# Patient Record
Sex: FEMALE | Race: WHITE | NOT HISPANIC OR LATINO
[De-identification: names, ages, dates, MRNs, and addresses within clinical notes are randomized per-mention and may not be internally consistent; named-entity substitution may affect disease eponyms.]

---

## 2017-07-14 ENCOUNTER — APPOINTMENT (OUTPATIENT)
Dept: UROLOGY | Facility: CLINIC | Age: 67
End: 2017-07-14

## 2017-07-14 DIAGNOSIS — Z80.3 FAMILY HISTORY OF MALIGNANT NEOPLASM OF BREAST: ICD-10-CM

## 2017-07-14 DIAGNOSIS — Z87.898 PERSONAL HISTORY OF OTHER SPECIFIED CONDITIONS: ICD-10-CM

## 2017-07-14 LAB
APPEARANCE: ABNORMAL
BACTERIA: ABNORMAL
BILIRUBIN URINE: NEGATIVE
BLOOD URINE: ABNORMAL
COLOR: YELLOW
GLUCOSE QUALITATIVE U: 1000 MG/DL
HYALINE CASTS: 1 /LPF
KETONES URINE: NEGATIVE
LEUKOCYTE ESTERASE URINE: ABNORMAL
MICROSCOPIC-UA: NORMAL
NITRITE URINE: NEGATIVE
PH URINE: 6.5
PROTEIN URINE: 30 MG/DL
RED BLOOD CELLS URINE: 2 /HPF
SPECIFIC GRAVITY URINE: 1.03
SQUAMOUS EPITHELIAL CELLS: 1 /HPF
UROBILINOGEN URINE: NORMAL MG/DL
WHITE BLOOD CELLS URINE: 224 /HPF

## 2017-07-19 ENCOUNTER — TRANSCRIPTION ENCOUNTER (OUTPATIENT)
Age: 67
End: 2017-07-19

## 2017-07-20 LAB — BACTERIA UR CULT: ABNORMAL

## 2017-09-08 ENCOUNTER — APPOINTMENT (OUTPATIENT)
Dept: UROLOGY | Facility: CLINIC | Age: 67
End: 2017-09-08
Payer: MEDICARE

## 2017-09-08 VITALS
TEMPERATURE: 98 F | SYSTOLIC BLOOD PRESSURE: 191 MMHG | BODY MASS INDEX: 37.89 KG/M2 | HEART RATE: 62 BPM | RESPIRATION RATE: 16 BRPM | HEIGHT: 68 IN | WEIGHT: 250 LBS | DIASTOLIC BLOOD PRESSURE: 71 MMHG

## 2017-09-08 PROCEDURE — 99214 OFFICE O/P EST MOD 30 MIN: CPT

## 2017-09-09 LAB — BACTERIA UR CULT: ABNORMAL

## 2018-01-02 ENCOUNTER — APPOINTMENT (OUTPATIENT)
Dept: UROLOGY | Facility: CLINIC | Age: 68
End: 2018-01-02

## 2018-03-09 LAB — BACTERIA UR CULT: NORMAL

## 2018-05-18 ENCOUNTER — LABORATORY RESULT (OUTPATIENT)
Age: 68
End: 2018-05-18

## 2018-06-01 ENCOUNTER — MEDICATION RENEWAL (OUTPATIENT)
Age: 68
End: 2018-06-01

## 2018-06-01 LAB
APPEARANCE: CLEAR
BACTERIA: NEGATIVE
BILIRUBIN URINE: NEGATIVE
BLOOD URINE: NEGATIVE
COLOR: YELLOW
GLUCOSE QUALITATIVE U: 1000 MG/DL
HYALINE CASTS: 4 /LPF
KETONES URINE: ABNORMAL
LEUKOCYTE ESTERASE URINE: NEGATIVE
MICROSCOPIC-UA: NORMAL
NITRITE URINE: NEGATIVE
PH URINE: 5.5
PROTEIN URINE: NEGATIVE MG/DL
RED BLOOD CELLS URINE: 2 /HPF
SPECIFIC GRAVITY URINE: 1.03
SQUAMOUS EPITHELIAL CELLS: 1 /HPF
UROBILINOGEN URINE: NEGATIVE MG/DL
WHITE BLOOD CELLS URINE: 1 /HPF

## 2018-06-02 ENCOUNTER — TRANSCRIPTION ENCOUNTER (OUTPATIENT)
Age: 68
End: 2018-06-02

## 2018-06-04 LAB — BACTERIA UR CULT: NORMAL

## 2018-06-11 ENCOUNTER — TRANSCRIPTION ENCOUNTER (OUTPATIENT)
Age: 68
End: 2018-06-11

## 2018-07-20 ENCOUNTER — RX RENEWAL (OUTPATIENT)
Age: 68
End: 2018-07-20

## 2018-08-07 ENCOUNTER — APPOINTMENT (OUTPATIENT)
Dept: UROLOGY | Facility: CLINIC | Age: 68
End: 2018-08-07
Payer: MEDICARE

## 2018-08-07 VITALS
OXYGEN SATURATION: 97 % | TEMPERATURE: 98.3 F | SYSTOLIC BLOOD PRESSURE: 136 MMHG | DIASTOLIC BLOOD PRESSURE: 74 MMHG | HEART RATE: 55 BPM

## 2018-08-07 PROCEDURE — 99214 OFFICE O/P EST MOD 30 MIN: CPT

## 2018-08-08 LAB
APPEARANCE: CLEAR
BACTERIA: NEGATIVE
BILIRUBIN URINE: NEGATIVE
BLOOD URINE: NEGATIVE
COLOR: YELLOW
GLUCOSE QUALITATIVE U: 1000 MG/DL
HYALINE CASTS: 6 /LPF
KETONES URINE: NEGATIVE
LEUKOCYTE ESTERASE URINE: NEGATIVE
MICROSCOPIC-UA: NORMAL
NITRITE URINE: NEGATIVE
PH URINE: 6
PROTEIN URINE: NEGATIVE MG/DL
RED BLOOD CELLS URINE: 3 /HPF
SPECIFIC GRAVITY URINE: 1.02
SQUAMOUS EPITHELIAL CELLS: 2 /HPF
UROBILINOGEN URINE: 1 MG/DL
WHITE BLOOD CELLS URINE: 2 /HPF

## 2018-08-09 LAB — BACTERIA UR CULT: NORMAL

## 2018-10-08 ENCOUNTER — LABORATORY RESULT (OUTPATIENT)
Age: 68
End: 2018-10-08

## 2018-10-08 ENCOUNTER — NON-APPOINTMENT (OUTPATIENT)
Age: 68
End: 2018-10-08

## 2018-10-08 ENCOUNTER — APPOINTMENT (OUTPATIENT)
Dept: CARDIOLOGY | Facility: CLINIC | Age: 68
End: 2018-10-08
Payer: MEDICARE

## 2018-10-08 VITALS
BODY MASS INDEX: 41.22 KG/M2 | DIASTOLIC BLOOD PRESSURE: 71 MMHG | SYSTOLIC BLOOD PRESSURE: 178 MMHG | OXYGEN SATURATION: 99 % | HEART RATE: 73 BPM | HEIGHT: 68 IN | WEIGHT: 272 LBS

## 2018-10-08 DIAGNOSIS — Z00.00 ENCOUNTER FOR GENERAL ADULT MEDICAL EXAMINATION W/OUT ABNORMAL FINDINGS: ICD-10-CM

## 2018-10-08 DIAGNOSIS — Z99.89 OBSTRUCTIVE SLEEP APNEA (ADULT) (PEDIATRIC): ICD-10-CM

## 2018-10-08 DIAGNOSIS — G47.33 OBSTRUCTIVE SLEEP APNEA (ADULT) (PEDIATRIC): ICD-10-CM

## 2018-10-08 PROCEDURE — 93000 ELECTROCARDIOGRAM COMPLETE: CPT

## 2018-10-08 PROCEDURE — 36415 COLL VENOUS BLD VENIPUNCTURE: CPT

## 2018-10-08 PROCEDURE — 99204 OFFICE O/P NEW MOD 45 MIN: CPT | Mod: 25

## 2018-10-08 RX ORDER — BUDESONIDE AND FORMOTEROL FUMARATE DIHYDRATE 80; 4.5 UG/1; UG/1
80-4.5 AEROSOL RESPIRATORY (INHALATION) TWICE DAILY
Refills: 2 | Status: ACTIVE | COMMUNITY

## 2018-10-09 LAB
25(OH)D3 SERPL-MCNC: 12 NG/ML
ALBUMIN SERPL ELPH-MCNC: 4.9 G/DL
ALP BLD-CCNC: 91 U/L
ALT SERPL-CCNC: 78 U/L
ANION GAP SERPL CALC-SCNC: 16 MMOL/L
AST SERPL-CCNC: 33 U/L
BASOPHILS # BLD AUTO: 0.05 K/UL
BASOPHILS NFR BLD AUTO: 0.5 %
BILIRUB SERPL-MCNC: 0.4 MG/DL
BUN SERPL-MCNC: 21 MG/DL
CALCIUM SERPL-MCNC: 9.8 MG/DL
CHLORIDE SERPL-SCNC: 104 MMOL/L
CHOLEST SERPL-MCNC: 248 MG/DL
CHOLEST/HDLC SERPL: 3.4 RATIO
CO2 SERPL-SCNC: 22 MMOL/L
CREAT SERPL-MCNC: 1.1 MG/DL
EOSINOPHIL # BLD AUTO: 0.21 K/UL
EOSINOPHIL NFR BLD AUTO: 2.2 %
GLUCOSE SERPL-MCNC: 88 MG/DL
HBA1C MFR BLD HPLC: 9.2 %
HCT VFR BLD CALC: 43 %
HDLC SERPL-MCNC: 72 MG/DL
HGB BLD-MCNC: 14.1 G/DL
IMM GRANULOCYTES NFR BLD AUTO: 0.6 %
LDLC SERPL CALC-MCNC: 159 MG/DL
LYMPHOCYTES # BLD AUTO: 2.15 K/UL
LYMPHOCYTES NFR BLD AUTO: 22.5 %
MAN DIFF?: NORMAL
MCHC RBC-ENTMCNC: 28.1 PG
MCHC RBC-ENTMCNC: 32.8 GM/DL
MCV RBC AUTO: 85.7 FL
MONOCYTES # BLD AUTO: 0.73 K/UL
MONOCYTES NFR BLD AUTO: 7.6 %
NEUTROPHILS # BLD AUTO: 6.36 K/UL
NEUTROPHILS NFR BLD AUTO: 66.6 %
PLATELET # BLD AUTO: 255 K/UL
POTASSIUM SERPL-SCNC: 4.4 MMOL/L
PROT SERPL-MCNC: 7.2 G/DL
RBC # BLD: 5.02 M/UL
RBC # FLD: 14.4 %
SODIUM SERPL-SCNC: 142 MMOL/L
TRIGL SERPL-MCNC: 83 MG/DL
WBC # FLD AUTO: 9.56 K/UL

## 2018-10-10 LAB
FOLATE SERPL-MCNC: 10.7 NG/ML
TSH SERPL-ACNC: 1.6 UIU/ML
VIT B12 SERPL-MCNC: 439 PG/ML

## 2018-11-09 ENCOUNTER — APPOINTMENT (OUTPATIENT)
Dept: CARDIOLOGY | Facility: CLINIC | Age: 68
End: 2018-11-09

## 2018-11-15 ENCOUNTER — APPOINTMENT (OUTPATIENT)
Dept: CARDIOLOGY | Facility: CLINIC | Age: 68
End: 2018-11-15
Payer: MEDICARE

## 2018-11-15 PROCEDURE — 93306 TTE W/DOPPLER COMPLETE: CPT

## 2018-11-16 ENCOUNTER — APPOINTMENT (OUTPATIENT)
Dept: CARDIOLOGY | Facility: CLINIC | Age: 68
End: 2018-11-16
Payer: MEDICARE

## 2018-11-16 PROCEDURE — 93880 EXTRACRANIAL BILAT STUDY: CPT

## 2018-12-07 ENCOUNTER — APPOINTMENT (OUTPATIENT)
Dept: CARDIOLOGY | Facility: CLINIC | Age: 68
End: 2018-12-07

## 2019-01-09 ENCOUNTER — APPOINTMENT (OUTPATIENT)
Dept: CARDIOLOGY | Facility: CLINIC | Age: 69
End: 2019-01-09
Payer: MEDICARE

## 2019-01-09 DIAGNOSIS — Z01.818 ENCOUNTER FOR OTHER PREPROCEDURAL EXAMINATION: ICD-10-CM

## 2019-01-09 PROCEDURE — 36415 COLL VENOUS BLD VENIPUNCTURE: CPT

## 2019-01-09 PROCEDURE — 93015 CV STRESS TEST SUPVJ I&R: CPT

## 2019-01-10 LAB
INR PPP: 1.12 RATIO
PT BLD: 12.8 SEC

## 2019-01-11 LAB
ALBUMIN SERPL ELPH-MCNC: 4.4 G/DL
ALP BLD-CCNC: 89 U/L
ALT SERPL-CCNC: 27 U/L
ANION GAP SERPL CALC-SCNC: 16 MMOL/L
APTT BLD: 32.3 SEC
AST SERPL-CCNC: 18 U/L
BASOPHILS # BLD AUTO: 0.03 K/UL
BASOPHILS NFR BLD AUTO: 0.3 %
BILIRUB SERPL-MCNC: 0.4 MG/DL
BUN SERPL-MCNC: 19 MG/DL
CALCIUM SERPL-MCNC: 9.7 MG/DL
CHLORIDE SERPL-SCNC: 103 MMOL/L
CO2 SERPL-SCNC: 19 MMOL/L
CREAT SERPL-MCNC: 1.33 MG/DL
EOSINOPHIL # BLD AUTO: 0.16 K/UL
EOSINOPHIL NFR BLD AUTO: 1.7 %
GLUCOSE SERPL-MCNC: 186 MG/DL
HCT VFR BLD CALC: 44.1 %
HGB BLD-MCNC: 14.1 G/DL
IMM GRANULOCYTES NFR BLD AUTO: 0.5 %
LYMPHOCYTES # BLD AUTO: 2 K/UL
LYMPHOCYTES NFR BLD AUTO: 20.7 %
MAN DIFF?: NORMAL
MCHC RBC-ENTMCNC: 27 PG
MCHC RBC-ENTMCNC: 32 GM/DL
MCV RBC AUTO: 84.3 FL
MONOCYTES # BLD AUTO: 0.72 K/UL
MONOCYTES NFR BLD AUTO: 7.5 %
NEUTROPHILS # BLD AUTO: 6.68 K/UL
NEUTROPHILS NFR BLD AUTO: 69.3 %
PLATELET # BLD AUTO: 284 K/UL
POTASSIUM SERPL-SCNC: 5 MMOL/L
PROT SERPL-MCNC: 6.9 G/DL
RBC # BLD: 5.23 M/UL
RBC # FLD: 15.4 %
SODIUM SERPL-SCNC: 138 MMOL/L
WBC # FLD AUTO: 9.64 K/UL

## 2019-01-11 NOTE — H&P ADULT - NSHPLABSRESULTS_GEN_ALL_CORE
1/15/18 TTE: EF 60%, mild MR  1/9/19: Exercise stress test: ST depression 2mm in inferior leads, brief episodes of AFib with aberrancy

## 2019-01-11 NOTE — H&P ADULT - ASSESSMENT
68 year old female with PMHx DM, SHANTE, HTN, negative cardiac cath in 2006 with abnormal stress test. Referred for cardiac cath and possible PCI.   Risks and benefits of procedure explained   Informed consent signed by pt 68 year old female with PMHx DM, SHANTE, HTN, negative cardiac cath in 2006 with abnormal stress test. Referred for cardiac cath and possible PCI.   Risks and benefits of procedure explained   CathPCIRisk: 3.1  Informed consent signed by pt

## 2019-01-11 NOTE — H&P ADULT - HISTORY OF PRESENT ILLNESS
68 year old female with PMHx DM, SHANTE, HTN 68 year old female with PMHx DM, SHANTE, HTN, negative cardiac cath in 2006 with abnormal stress test. Referred for cardiac cath and possible PCI. 68 year old female with PMHx DM, SHANTE, HTN, negative cardiac cath in 2006 with abnormal stress test suggestive of inferior wall ischemia. Referred for cardiac cath and possible PCI.

## 2019-01-14 ENCOUNTER — OUTPATIENT (OUTPATIENT)
Dept: OUTPATIENT SERVICES | Facility: HOSPITAL | Age: 69
LOS: 1 days | Discharge: HOME | End: 2019-01-14
Payer: MEDICARE

## 2019-01-14 VITALS
DIASTOLIC BLOOD PRESSURE: 92 MMHG | HEART RATE: 64 BPM | SYSTOLIC BLOOD PRESSURE: 169 MMHG | WEIGHT: 259.04 LBS | OXYGEN SATURATION: 98 % | RESPIRATION RATE: 16 BRPM | HEIGHT: 68 IN | TEMPERATURE: 97 F

## 2019-01-14 DIAGNOSIS — Z98.62 PERIPHERAL VASCULAR ANGIOPLASTY STATUS: Chronic | ICD-10-CM

## 2019-01-14 DIAGNOSIS — Z86.018 PERSONAL HISTORY OF OTHER BENIGN NEOPLASM: Chronic | ICD-10-CM

## 2019-01-14 LAB
BLD GP AB SCN SERPL QL: SIGNIFICANT CHANGE UP
GLUCOSE BLDC GLUCOMTR-MCNC: 197 MG/DL — HIGH (ref 70–99)
GLUCOSE BLDC GLUCOMTR-MCNC: 243 MG/DL — HIGH (ref 70–99)
GLUCOSE BLDC GLUCOMTR-MCNC: 254 MG/DL — HIGH (ref 70–99)
TYPE + AB SCN PNL BLD: SIGNIFICANT CHANGE UP

## 2019-01-14 PROCEDURE — 99152 MOD SED SAME PHYS/QHP 5/>YRS: CPT

## 2019-01-14 PROCEDURE — 93458 L HRT ARTERY/VENTRICLE ANGIO: CPT | Mod: 26,XU

## 2019-01-14 PROCEDURE — 92928 PRQ TCAT PLMT NTRAC ST 1 LES: CPT | Mod: RC

## 2019-01-14 PROCEDURE — 92978 ENDOLUMINL IVUS OCT C 1ST: CPT | Mod: 26,RC

## 2019-01-14 PROCEDURE — 93010 ELECTROCARDIOGRAM REPORT: CPT

## 2019-01-14 RX ORDER — METOPROLOL TARTRATE 50 MG
50 TABLET ORAL DAILY
Qty: 0 | Refills: 0 | Status: DISCONTINUED | OUTPATIENT
Start: 2019-01-14 | End: 2019-01-15

## 2019-01-14 RX ORDER — GLUCAGON INJECTION, SOLUTION 0.5 MG/.1ML
1 INJECTION, SOLUTION SUBCUTANEOUS ONCE
Qty: 0 | Refills: 0 | Status: DISCONTINUED | OUTPATIENT
Start: 2019-01-14 | End: 2019-01-15

## 2019-01-14 RX ORDER — DEXTROSE 50 % IN WATER 50 %
15 SYRINGE (ML) INTRAVENOUS ONCE
Qty: 0 | Refills: 0 | Status: DISCONTINUED | OUTPATIENT
Start: 2019-01-14 | End: 2019-01-15

## 2019-01-14 RX ORDER — ASPIRIN/CALCIUM CARB/MAGNESIUM 324 MG
81 TABLET ORAL DAILY
Qty: 0 | Refills: 0 | Status: DISCONTINUED | OUTPATIENT
Start: 2019-01-14 | End: 2019-01-15

## 2019-01-14 RX ORDER — DEXTROSE 50 % IN WATER 50 %
25 SYRINGE (ML) INTRAVENOUS ONCE
Qty: 0 | Refills: 0 | Status: DISCONTINUED | OUTPATIENT
Start: 2019-01-14 | End: 2019-01-15

## 2019-01-14 RX ORDER — INSULIN LISPRO 100/ML
VIAL (ML) SUBCUTANEOUS
Qty: 0 | Refills: 0 | Status: DISCONTINUED | OUTPATIENT
Start: 2019-01-14 | End: 2019-01-15

## 2019-01-14 RX ORDER — SODIUM CHLORIDE 9 MG/ML
1000 INJECTION, SOLUTION INTRAVENOUS
Qty: 0 | Refills: 0 | Status: DISCONTINUED | OUTPATIENT
Start: 2019-01-14 | End: 2019-01-15

## 2019-01-14 RX ORDER — BUDESONIDE AND FORMOTEROL FUMARATE DIHYDRATE 160; 4.5 UG/1; UG/1
2 AEROSOL RESPIRATORY (INHALATION)
Qty: 0 | Refills: 0 | Status: DISCONTINUED | OUTPATIENT
Start: 2019-01-14 | End: 2019-01-15

## 2019-01-14 RX ORDER — TICAGRELOR 90 MG/1
90 TABLET ORAL
Qty: 0 | Refills: 0 | Status: DISCONTINUED | OUTPATIENT
Start: 2019-01-14 | End: 2019-01-15

## 2019-01-14 RX ORDER — LOSARTAN POTASSIUM 100 MG/1
100 TABLET, FILM COATED ORAL DAILY
Qty: 0 | Refills: 0 | Status: DISCONTINUED | OUTPATIENT
Start: 2019-01-14 | End: 2019-01-15

## 2019-01-14 RX ORDER — DEXTROSE 50 % IN WATER 50 %
12.5 SYRINGE (ML) INTRAVENOUS ONCE
Qty: 0 | Refills: 0 | Status: DISCONTINUED | OUTPATIENT
Start: 2019-01-14 | End: 2019-01-15

## 2019-01-14 RX ORDER — MONTELUKAST 4 MG/1
10 TABLET, CHEWABLE ORAL DAILY
Qty: 0 | Refills: 0 | Status: DISCONTINUED | OUTPATIENT
Start: 2019-01-14 | End: 2019-01-15

## 2019-01-14 RX ORDER — INSULIN LISPRO 100/ML
VIAL (ML) SUBCUTANEOUS AT BEDTIME
Qty: 0 | Refills: 0 | Status: DISCONTINUED | OUTPATIENT
Start: 2019-01-14 | End: 2019-01-15

## 2019-01-14 RX ADMIN — Medication 50 MILLIGRAM(S): at 14:24

## 2019-01-14 RX ADMIN — TICAGRELOR 90 MILLIGRAM(S): 90 TABLET ORAL at 18:38

## 2019-01-14 RX ADMIN — SODIUM CHLORIDE 75 MILLILITER(S): 9 INJECTION, SOLUTION INTRAVENOUS at 10:36

## 2019-01-14 RX ADMIN — Medication 1: at 16:47

## 2019-01-14 NOTE — PROGRESS NOTE ADULT - SUBJECTIVE AND OBJECTIVE BOX
Nurse Practitioner Progress note:   s/p PCI.  Denies chest pain, shortness of breath, dizziness or palpitations at this time  A+Ox3  CV: S1S2 reg  Respiratory: CTAB, normal effort  Right radial hemoband   Procedure site CDI, no bleeding, no hematoma, able to move all digits with capillary refill < 3 seconds, fingers warm      T(C): 36.1 (01-14-19 @ 07:50), Max: 36.1 (01-14-19 @ 07:37)  HR: 68 (01-14-19 @ 10:00) (64 - 68)  BP: 168/70 (01-14-19 @ 10:15) (155/58 - 178/73)  RR: 16 (01-14-19 @ 10:15) (16 - 16)  SpO2: 98% (01-14-19 @ 10:15) (97% - 98%)      MEDICATIONS  (STANDING):  aspirin  chewable 81 milliGRAM(s) Oral daily  buDESOnide  80 MICROgram(s)/formoterol 4.5 MICROgram(s) Inhaler 2 Puff(s) Inhalation two times a day  losartan 100 milliGRAM(s) Oral daily  metoprolol succinate ER 50 milliGRAM(s) Oral daily  sodium chloride 0.45%. 1000 milliLiter(s) (75 mL/Hr) IV Continuous <Continuous>  ticagrelor 90 milliGRAM(s) Oral two times a day    MEDICATIONS  (PRN):  montelukast 10 milliGRAM(s) Oral daily PRN wheezing        HPI:  68 year old female with PMHx DM, SHANTE, HTN, negative cardiac cath in 2006 with abnormal stress test suggestive of inferior wall ischemia. Referred for cardiac cath and possible PCI. (11 Jan 2019 15:48)    now s/p of RCA  ASSESSMENT/PLAN:    Coronary artery disease  - CICU overnight  -VS, labs, diet, activity as per PCI orders  -IV hydration  -Encourage PO fluids  -Aspirin mg PO daily  -Brilinta 90mg PO BID  Continue BB and ARB  --atorvastatin 10mg PO QHS   -Plan of care discussed with patient, family and MD  -likely d/c in AM if patient remains stable overnight  -Follow-up with attending MD   within 1 week  -Discussed therapeutic lifestyle changes to reduce risk factors such as following a cardiac diet, weight loss, medication adherence Nurse Practitioner Progress note:   s/p PCI.  Denies chest pain, shortness of breath, dizziness or palpitations at this time  A+Ox3  CV: S1S2 reg  Respiratory: CTAB, normal effort  Right radial hemoband removed.  Procedure site CDI, no bleeding, no hematoma, able to move all digits with capillary refill < 3 seconds, fingers warm      T(C): 36.1 (01-14-19 @ 07:50), Max: 36.1 (01-14-19 @ 07:37)  HR: 68 (01-14-19 @ 10:00) (64 - 68)  BP: 168/70 (01-14-19 @ 10:15) (155/58 - 178/73)  RR: 16 (01-14-19 @ 10:15) (16 - 16)  SpO2: 98% (01-14-19 @ 10:15) (97% - 98%)      MEDICATIONS  (STANDING):  aspirin  chewable 81 milliGRAM(s) Oral daily  buDESOnide  80 MICROgram(s)/formoterol 4.5 MICROgram(s) Inhaler 2 Puff(s) Inhalation two times a day  losartan 100 milliGRAM(s) Oral daily  metoprolol succinate ER 50 milliGRAM(s) Oral daily  sodium chloride 0.45%. 1000 milliLiter(s) (75 mL/Hr) IV Continuous <Continuous>  ticagrelor 90 milliGRAM(s) Oral two times a day    MEDICATIONS  (PRN):  montelukast 10 milliGRAM(s) Oral daily PRN wheezing        HPI:  68 year old female with PMHx DM, SHANTE, HTN, negative cardiac cath in 2006 with abnormal stress test suggestive of inferior wall ischemia. Referred for cardiac cath and possible PCI. (11 Jan 2019 15:48)    now s/p of RCA  ASSESSMENT/PLAN:    Coronary artery disease  - CICU overnight  -VS, labs, diet, activity as per PCI orders  -IV hydration  -Encourage PO fluids  -Aspirin mg PO daily  -Brilinta 90mg PO BID  -Continue BB and ARB  --atorvastatin 10mg PO QHS   -Plan of care discussed with patient, family and MD  -likely d/c in AM if patient remains stable overnight  -Follow-up with attending MD   within 1 week  -Discussed therapeutic lifestyle changes to reduce risk factors such as following a cardiac diet, weight loss, medication adherence Nurse Practitioner Progress note:   s/p PCI.  Denies chest pain, shortness of breath, dizziness or palpitations at this time  A+Ox3  CV: S1S2 reg  Respiratory: CTAB, normal effort  Right radial hemoband removed.  Procedure site CDI, no bleeding, no hematoma, able to move all digits with capillary refill < 3 seconds, fingers warm      T(C): 36.1 (01-14-19 @ 07:50), Max: 36.1 (01-14-19 @ 07:37)  HR: 68 (01-14-19 @ 10:00) (64 - 68)  BP: 168/70 (01-14-19 @ 10:15) (155/58 - 178/73)  RR: 16 (01-14-19 @ 10:15) (16 - 16)  SpO2: 98% (01-14-19 @ 10:15) (97% - 98%)      MEDICATIONS  (STANDING):  aspirin  chewable 81 milliGRAM(s) Oral daily  buDESOnide  80 MICROgram(s)/formoterol 4.5 MICROgram(s) Inhaler 2 Puff(s) Inhalation two times a day  losartan 100 milliGRAM(s) Oral daily  metoprolol succinate ER 50 milliGRAM(s) Oral daily  sodium chloride 0.45%. 1000 milliLiter(s) (75 mL/Hr) IV Continuous <Continuous>  ticagrelor 90 milliGRAM(s) Oral two times a day    MEDICATIONS  (PRN):  montelukast 10 milliGRAM(s) Oral daily PRN wheezing        HPI:  68 year old female with PMHx DM, SHANTE, HTN, negative cardiac cath in 2006 with abnormal stress test suggestive of inferior wall ischemia. Referred for cardiac cath and possible PCI. (11 Jan 2019 15:48)    now s/p of RCA< from: Cardiac Cath Lab - Adult (01.14.19 @ 08:39) >  Successful Coronary Intervention CARLEY of RCA.     Recommendations     Manage with medical therapy.   Staged PCI of LCX in near future.    < end of copied text >    ASSESSMENT/PLAN:    Coronary artery disease  - CICU overnight  -VS, labs, diet, activity as per PCI orders  -IV hydration  -Encourage PO fluids  -Aspirin mg PO daily  -Brilinta 90mg PO BID  -Continue BB and ARB  -Plan of care discussed with patient, family and MD  -likely d/c in AM if patient remains stable overnight  -Follow-up with attending MD   within 1 week  -Discussed therapeutic lifestyle changes to reduce risk factors such as following a cardiac diet, weight loss, medication adherence

## 2019-01-15 ENCOUNTER — TRANSCRIPTION ENCOUNTER (OUTPATIENT)
Age: 69
End: 2019-01-15

## 2019-01-15 VITALS — DIASTOLIC BLOOD PRESSURE: 60 MMHG | HEART RATE: 67 BPM | SYSTOLIC BLOOD PRESSURE: 158 MMHG

## 2019-01-15 DIAGNOSIS — I25.10 ATHEROSCLEROTIC HEART DISEASE OF NATIVE CORONARY ARTERY WITHOUT ANGINA PECTORIS: ICD-10-CM

## 2019-01-15 LAB
ANION GAP SERPL CALC-SCNC: 9 MMOL/L — SIGNIFICANT CHANGE UP (ref 5–17)
BASOPHILS # BLD AUTO: 0.05 K/UL — SIGNIFICANT CHANGE UP (ref 0–0.2)
BASOPHILS NFR BLD AUTO: 0.6 % — SIGNIFICANT CHANGE UP (ref 0–2)
BUN SERPL-MCNC: 17 MG/DL — SIGNIFICANT CHANGE UP (ref 7–23)
CALCIUM SERPL-MCNC: 9 MG/DL — SIGNIFICANT CHANGE UP (ref 8.5–10.1)
CHLORIDE SERPL-SCNC: 107 MMOL/L — SIGNIFICANT CHANGE UP (ref 96–108)
CO2 SERPL-SCNC: 25 MMOL/L — SIGNIFICANT CHANGE UP (ref 22–31)
CREAT SERPL-MCNC: 0.9 MG/DL — SIGNIFICANT CHANGE UP (ref 0.5–1.3)
EOSINOPHIL # BLD AUTO: 0.14 K/UL — SIGNIFICANT CHANGE UP (ref 0–0.5)
EOSINOPHIL NFR BLD AUTO: 1.6 % — SIGNIFICANT CHANGE UP (ref 0–6)
GLUCOSE BLDC GLUCOMTR-MCNC: 243 MG/DL — HIGH (ref 70–99)
GLUCOSE SERPL-MCNC: 242 MG/DL — HIGH (ref 70–99)
HBA1C BLD-MCNC: 8.2 % — HIGH (ref 4–5.6)
HCT VFR BLD CALC: 43 % — SIGNIFICANT CHANGE UP (ref 34.5–45)
HGB BLD-MCNC: 13.8 G/DL — SIGNIFICANT CHANGE UP (ref 11.5–15.5)
IMM GRANULOCYTES NFR BLD AUTO: 0.4 % — SIGNIFICANT CHANGE UP (ref 0–1.5)
LYMPHOCYTES # BLD AUTO: 1.55 K/UL — SIGNIFICANT CHANGE UP (ref 1–3.3)
LYMPHOCYTES # BLD AUTO: 18.2 % — SIGNIFICANT CHANGE UP (ref 13–44)
MCHC RBC-ENTMCNC: 26.9 PG — LOW (ref 27–34)
MCHC RBC-ENTMCNC: 32.1 GM/DL — SIGNIFICANT CHANGE UP (ref 32–36)
MCV RBC AUTO: 83.8 FL — SIGNIFICANT CHANGE UP (ref 80–100)
MONOCYTES # BLD AUTO: 0.72 K/UL — SIGNIFICANT CHANGE UP (ref 0–0.9)
MONOCYTES NFR BLD AUTO: 8.5 % — SIGNIFICANT CHANGE UP (ref 2–14)
NEUTROPHILS # BLD AUTO: 6.02 K/UL — SIGNIFICANT CHANGE UP (ref 1.8–7.4)
NEUTROPHILS NFR BLD AUTO: 70.7 % — SIGNIFICANT CHANGE UP (ref 43–77)
NRBC # BLD: 0 /100 WBCS — SIGNIFICANT CHANGE UP (ref 0–0)
PLATELET # BLD AUTO: 229 K/UL — SIGNIFICANT CHANGE UP (ref 150–400)
POTASSIUM SERPL-MCNC: 4.6 MMOL/L — SIGNIFICANT CHANGE UP (ref 3.5–5.3)
POTASSIUM SERPL-SCNC: 4.6 MMOL/L — SIGNIFICANT CHANGE UP (ref 3.5–5.3)
RBC # BLD: 5.13 M/UL — SIGNIFICANT CHANGE UP (ref 3.8–5.2)
RBC # FLD: 15.1 % — HIGH (ref 10.3–14.5)
SODIUM SERPL-SCNC: 141 MMOL/L — SIGNIFICANT CHANGE UP (ref 135–145)
WBC # BLD: 8.51 K/UL — SIGNIFICANT CHANGE UP (ref 3.8–10.5)
WBC # FLD AUTO: 8.51 K/UL — SIGNIFICANT CHANGE UP (ref 3.8–10.5)

## 2019-01-15 PROCEDURE — 99204 OFFICE O/P NEW MOD 45 MIN: CPT

## 2019-01-15 PROCEDURE — 93010 ELECTROCARDIOGRAM REPORT: CPT

## 2019-01-15 RX ORDER — ATORVASTATIN CALCIUM 80 MG/1
1 TABLET, FILM COATED ORAL
Qty: 31 | Refills: 1
Start: 2019-01-15 | End: 2019-03-17

## 2019-01-15 RX ORDER — ATORVASTATIN CALCIUM 80 MG/1
40 TABLET, FILM COATED ORAL AT BEDTIME
Qty: 0 | Refills: 0 | Status: DISCONTINUED | OUTPATIENT
Start: 2019-01-15 | End: 2019-01-15

## 2019-01-15 RX ORDER — ASPIRIN/CALCIUM CARB/MAGNESIUM 324 MG
1 TABLET ORAL
Qty: 0 | Refills: 0 | DISCHARGE
Start: 2019-01-15

## 2019-01-15 RX ORDER — TICAGRELOR 90 MG/1
1 TABLET ORAL
Qty: 180 | Refills: 3
Start: 2019-01-15 | End: 2020-01-09

## 2019-01-15 RX ADMIN — Medication 50 MILLIGRAM(S): at 08:30

## 2019-01-15 RX ADMIN — TICAGRELOR 90 MILLIGRAM(S): 90 TABLET ORAL at 06:14

## 2019-01-15 RX ADMIN — Medication 2: at 08:26

## 2019-01-15 RX ADMIN — Medication 81 MILLIGRAM(S): at 08:30

## 2019-01-15 NOTE — PROGRESS NOTE ADULT - SUBJECTIVE AND OBJECTIVE BOX
HPI:  68 year old female with PMHx DM, SHANTE, HTN, negative cardiac cath in 2006 with abnormal stress test suggestive of inferior wall ischemia. Referred for cardiac cath and possible PCI. s/p CC yesterday with 2 Vessel CAD. PCI of RCA yesterday.  Feels well today without chest pain or SOB.      PAST MEDICAL & SURGICAL HISTORY:  Diabetes, type I  Obesity  SHANTE on CPAP  H/O lipoma: removal    MEDICATIONS  (STANDING):  aspirin  chewable 81 milliGRAM(s) Oral daily  atorvastatin 40 milliGRAM(s) Oral at bedtime  buDESOnide  80 MICROgram(s)/formoterol 4.5 MICROgram(s) Inhaler 2 Puff(s) Inhalation two times a day  dextrose 5%. 1000 milliLiter(s) (50 mL/Hr) IV Continuous <Continuous>  dextrose 50% Injectable 12.5 Gram(s) IV Push once  dextrose 50% Injectable 25 Gram(s) IV Push once  dextrose 50% Injectable 25 Gram(s) IV Push once  insulin lispro (HumaLOG) corrective regimen sliding scale   SubCutaneous three times a day before meals  insulin lispro (HumaLOG) corrective regimen sliding scale   SubCutaneous at bedtime  losartan 100 milliGRAM(s) Oral daily  metoprolol succinate ER 50 milliGRAM(s) Oral daily  sodium chloride 0.45%. 1000 milliLiter(s) (75 mL/Hr) IV Continuous <Continuous>  ticagrelor 90 milliGRAM(s) Oral two times a day    MEDICATIONS  (PRN):  dextrose 40% Gel 15 Gram(s) Oral once PRN Blood Glucose LESS THAN 70 milliGRAM(s)/deciliter  glucagon  Injectable 1 milliGRAM(s) IntraMuscular once PRN Glucose LESS THAN 70 milligrams/deciliter  montelukast 10 milliGRAM(s) Oral daily PRN wheezing      Vital Signs Last 24 Hrs  T(C): 36.7 (15 Jerel 2019 05:17), Max: 37.4 (14 Jan 2019 21:12)  T(F): 98.1 (15 Jerel 2019 05:17), Max: 99.3 (14 Jan 2019 21:12)  HR: 76 (15 Jerel 2019 05:40) (57 - 81)  BP: 152/52 (15 Ejrel 2019 05:40) (105/89 - 178/73)  BP(mean): 77 (15 Jerel 2019 05:40) (61 - 92)  RR: 13 (14 Jan 2019 15:30) (13 - 23)  SpO2: 97% (15 Jerel 2019 05:40) (96% - 99%)    I&O's Detail    14 Jan 2019 07:01  -  15 Jerel 2019 07:00  --------------------------------------------------------  IN:  Total IN: 0 mL    OUT:    Voided: 150 mL  Total OUT: 150 mL    Total NET: -150 mL    Allergies  No Known Allergies    REVIEW OF SYSTEMS: As mentioned in HPI all others Negative     Vital Signs Last 24 Hrs  T(C): 36.7 (15 Jerel 2019 05:17), Max: 37.4 (14 Jan 2019 21:12)  T(F): 98.1 (15 Jerel 2019 05:17), Max: 99.3 (14 Jan 2019 21:12)  HR: 58 (15 Jerel 2019 02:00) (57 - 81)  BP: 150/69 (15 Jerel 2019 02:00) (105/89 - 178/73)  BP(mean): 85 (15 Jerel 2019 02:00) (61 - 92)  RR: 13 (14 Jan 2019 15:30) (13 - 23)  SpO2: 96% (14 Jan 2019 22:00) (96% - 99%)    PHYSICAL EXAM:  NERVOUS SYSTEM:  Alert & Oriented X3, No focal motor defecits.  CHEST/LUNG: Clear to auscultation bilaterally; No rales, rhonchi, wheezing, or rubs  HEART: Regular rate and rhythm; No murmurs, rubs, or gallops  ABDOMEN: Soft, Nontender, Nondistended; Bowel sounds present  EXTREMITIES: No clubbing, cyanosis, or edema  SKIN: right radial cath site without bleeding or hematoma  Vascular: right radial access site without issues. 2+ Peripheral Pulses,    LABS:                        13.8   8.51  )-----------( 229      ( 15 Jerel 2019 05:33 )             43.0     01-15    141  |  107  |  17  ----------------------------<  242<H>  4.6   |  25  |  0.90    Ca    9.0      15 Jerel 2019 05:33      < from: Cardiac Cath Lab - Adult (01.14.19 @ 08:39) >  Angiographic Findings     Cardiac Arteries and Lesion Findings    LMCA: Diffuse irregularity.There is mild diffuse disease noted.    LAD: Diffuse irregularity.     Prox LAD: Distal subsection.40% stenosis 12 mm length.Pre procedure ARYAN   III flow was noted. Good runoff was present.The lesion was diagnosed as a   low risk lesion.     Devices used     Dist LAD: Mid subsection.25% stenosis 20 mm length.Pre procedure ARYAN III   flow was noted. Good runoff was present.The lesion was diagnosed as a low   risk lesion.     Devices used    LCx: Diffuse irregularity.     1st Ob Irais: Proximal subsection.85% stenosis 18 mm length.Pre procedure   ARYAN III flow was noted. Good runoff was present.The lesion was diagnosed   as a moderate risk lesion.     Devices used    RCA: Diffuse irregularity.     Mid RCA: Distal subsection.90% stenosis 22 mm length reduced to 0%.Pre   procedure ARYAN III flow was noted. Good runoff was present.The lesion was   diagnosed as a moderate risk lesion.The lesion was tubular.The lesion   showed moderate angulation and mild tortuosity.     Post Procedure ARYAN III flow was present.     Treatment results:Interventional treatment was successful.     Comments:IVUS showed suboptimal stent apposition post deployment. The   lession was than post dilated with a larger balloon.     Devices used     * 6FR AR 1 LAUNCHER     * COUGAR XT 190CM     * Euphora SC     Diameter: 2.5 mm. Length: 20 mm. Total duration: 17 sec.1 inflation(s).   to a max pressure of: 12 VICKIE.     * Synergy CARLEY Stent     Diameter: 3 mm. Length: 28 mm. Total duration: 20 sec.1 inflation(s). to   a max pressure of: 22 VICKIE.     * EAGLE EYE PLATINUM IVUS     * NC Euphora     Diameter: 4 mm. Length: 15 mm. Total duration: 28 sec.2 inflation(s). to   a max pressure of: 16 VICKIE.     Prox RCA: Proximal subsection.95% stenosis 24 mm length reduced to 0%.Pre   procedure ARYAN III flow was noted. Good runoff was present.The lesion was   diagnosed as a moderate risk lesion.The lesion was tubular.The lesion   showed moderate angulation and mild tortuosity.     Post Procedure ARYAN III flow was present.     Treatment results:Interventional treatment was successful.     Comments:IVUS showed suboptimal stent apposition post deployment. The   lession was than post dilated with a larger balloon.     Devices used     * 6FR AR 1 LAUNCHER     * COUGAR XT 190CM     * Euphora SC     Diameter: 2.5 mm. Length: 20 mm. Total duration: 16 sec.1 inflation(s).   to a max pressure of: 12 VICKIE.     * Synergy CARLEY Stent     Diameter: 3.5 mm. Length: 28 mm. Total duration: 18 sec.1 inflation(s).   to a max pressure of: 22 VICKIE.     * EAGLE EYE PLATINUM IVUS     * NC Euphora     Diameter: 4 mm. Length: 15 mm.Total duration: 24 sec.2 inflation(s). to   a max pressure of: 16 VICKIE.    Valves  +------+----------------------------+----------------------------+---------  +  !Valve !Stenosis                    !Insufficiency                 !Comments !  +------+----------------------------+----------------------------+---------  +  !Aortic!No                          !Not assessed                !           !  +------+----------------------------+----------------------------+---------  +  !Mitral!Not assessed                !No insufficiency            !           !  +------+----------------------------+----------------------------+---------  +    VA  LV function assessed as:Normal.  Ejection Fraction  +----------------------------------------------------------------------+---  +  !Method                                                                  !EF%!  +----------------------------------------------------------------------+---  +  !LV gram         !60 !  +----------------------------------------------------------------------+---  +     Coronary tree     Dominance: Right     Impression     Diagnostic Conclusions   2 Vessel CAD with LCX and RCA disease. NL LV FX.     Interventional Conclusions     Successful Coronary Intervention CARLEY of RCA.     Recommendations     Manage with medical therapy.   Staged PCI of LCX in near future.

## 2019-01-15 NOTE — PROGRESS NOTE ADULT - ASSESSMENT
HPI:  68 year old female with PMHx DM, SHANTE, HTN, negative cardiac cath in 2006 with abnormal stress test suggestive of inferior wall ischemia. Referred for cardiac cath and possible PCI. (11 Jan 2019 15:48)    Patient now s/p angiogram  with PCI and CARLEY x2  to the RCA    - AM labs and EKG reviewed   - procedure, outcome and f/u care reviewed with patient  - continue ASA/ Brilinta   - start statin  - continue BB/ AEB  - DC home today  - f/u with MD in 4-7 days

## 2019-01-15 NOTE — DISCHARGE NOTE ADULT - PATIENT PORTAL LINK FT
You can access the [facility], offered by [facility], by registering with the following website: [email or web address]

## 2019-01-15 NOTE — DISCHARGE NOTE ADULT - MEDICATION SUMMARY - MEDICATIONS TO TAKE
I will START or STAY ON the medications listed below when I get home from the hospital:    aspirin 81 mg oral tablet, chewable  -- 1 tab(s) by mouth once a day  -- Indication: For CAD    irbesartan 300 mg oral tablet  -- 1 tab(s) by mouth once a day  -- Indication: For HTN    Tresiba FlexTouch 100 units/mL subcutaneous solution  -- 46 unit(s) subcutaneous once a day (in the evening)  -- Indication: For Diabetes, type I    NovoLOG FlexPen 100 units/mL injectable solution  -- injectable 3 times a day (with meals)  -- Indication: For Diabetes, type I    atorvastatin 40 mg oral tablet  -- 1 tab(s) by mouth once a day (at bedtime)  -- Indication: For HLD    ticagrelor 90 mg oral tablet  -- 1 tab(s) by mouth 2 times a day  -- Indication: For stent    Metoprolol Succinate ER 50 mg oral tablet, extended release  -- 1 tab(s) by mouth once a day  -- Indication: For HTN    Symbicort 80 mcg-4.5 mcg/inh inhalation aerosol  -- 2 puff(s) inhaled 2 times a day, As Needed  -- Indication: For respiratory     montelukast 10 mg oral tablet  -- 1 tab(s) by mouth once a day, As Needed  -- Indication: For Respiratory

## 2019-01-15 NOTE — DISCHARGE NOTE ADULT - CARE PLAN
Principal Discharge DX:	CAD (coronary artery disease)  Goal:	symptom free  Assessment and plan of treatment:	as per post cath

## 2019-01-15 NOTE — DISCHARGE NOTE ADULT - CARE PROVIDER_API CALL
Tarik Lopes (MD), Cardiovascular Disease  [address] [place] [address]  Phone: [phone]  Fax: [phone]

## 2019-01-15 NOTE — PROGRESS NOTE ADULT - SUBJECTIVE AND OBJECTIVE BOX
Cardiology NP     Patient is a 68y old  Female who presents with a chief complaint of abnormal stress test (15 Jerel 2019 07:06)      HPI:  68 year old female with PMHx DM, SHANTE, HTN, negative cardiac cath in 2006 with abnormal stress test suggestive of inferior wall ischemia. Referred for cardiac cath and possible PCI. (11 Jan 2019 15:48)      PAST MEDICAL & SURGICAL HISTORY:  Diabetes, type I  Obesity  SHANTE on CPAP  H/O lipoma: removal      MEDICATIONS  (STANDING):  aspirin  chewable 81 milliGRAM(s) Oral daily  atorvastatin 40 milliGRAM(s) Oral at bedtime  buDESOnide  80 MICROgram(s)/formoterol 4.5 MICROgram(s) Inhaler 2 Puff(s) Inhalation two times a day  dextrose 5%. 1000 milliLiter(s) (50 mL/Hr) IV Continuous <Continuous>  dextrose 50% Injectable 12.5 Gram(s) IV Push once  dextrose 50% Injectable 25 Gram(s) IV Push once  dextrose 50% Injectable 25 Gram(s) IV Push once  insulin lispro (HumaLOG) corrective regimen sliding scale   SubCutaneous three times a day before meals  insulin lispro (HumaLOG) corrective regimen sliding scale   SubCutaneous at bedtime  losartan 100 milliGRAM(s) Oral daily  metoprolol succinate ER 50 milliGRAM(s) Oral daily  sodium chloride 0.45%. 1000 milliLiter(s) (75 mL/Hr) IV Continuous <Continuous>  ticagrelor 90 milliGRAM(s) Oral two times a day    MEDICATIONS  (PRN):  dextrose 40% Gel 15 Gram(s) Oral once PRN Blood Glucose LESS THAN 70 milliGRAM(s)/deciliter  glucagon  Injectable 1 milliGRAM(s) IntraMuscular once PRN Glucose LESS THAN 70 milligrams/deciliter  montelukast 10 milliGRAM(s) Oral daily PRN wheezing      Allergies    No Known Allergies    Intolerances        REVIEW OF SYSTEMS: As mentioned in HPI all others Negative     Vital Signs Last 24 Hrs  T(C): 36.7 (15 Jerel 2019 05:17), Max: 37.4 (14 Jan 2019 21:12)  T(F): 98.1 (15 Jerel 2019 05:17), Max: 99.3 (14 Jan 2019 21:12)  HR: 58 (15 Jerel 2019 02:00) (57 - 81)  BP: 150/69 (15 Jerel 2019 02:00) (105/89 - 178/73)  BP(mean): 85 (15 Jerel 2019 02:00) (61 - 92)  RR: 13 (14 Jan 2019 15:30) (13 - 23)  SpO2: 96% (14 Jan 2019 22:00) (96% - 99%)    PHYSICAL EXAM:  NERVOUS SYSTEM:  Alert & Oriented X3, Good concentration  CHEST/LUNG: Clear to auscultation bilaterally; No rales, rhonchi, wheezing, or rubs  HEART: Regular rate and rhythm; No murmurs, rubs, or gallops  ABDOMEN: Soft, Nontender, Nondistended; Bowel sounds present  EXTREMITIES:  2+ Peripheral Pulses, No clubbing, cyanosis, or edema  SKIN: right radial cath site without bleeding or hematoma    LABS:                        13.8   8.51  )-----------( 229      ( 15 Jerel 2019 05:33 )             43.0     01-15    141  |  107  |  17  ----------------------------<  242<H>  4.6   |  25  |  0.90    Ca    9.0      15 Jerel 2019 05:33          CAPILLARY BLOOD GLUCOSE      POCT Blood Glucose.: 243 mg/dL (14 Jan 2019 21:14)  POCT Blood Glucose.: 197 mg/dL (14 Jan 2019 16:13)  POCT Blood Glucose.: 254 mg/dL (14 Jan 2019 10:43)      EKG - SB unchanged

## 2019-01-15 NOTE — PROGRESS NOTE ADULT - PROBLEM SELECTOR PLAN 1
s/p PCI of RCA.  Started on Brilinta without issues. Now agrees to start statin (Lipitor) for Hyperlipidemia. Will DC today with outpatient labs next week and return after that for PCI of LCX.

## 2019-01-16 ENCOUNTER — OTHER (OUTPATIENT)
Age: 69
End: 2019-01-16

## 2019-01-21 DIAGNOSIS — E78.00 PURE HYPERCHOLESTEROLEMIA, UNSPECIFIED: ICD-10-CM

## 2019-01-21 DIAGNOSIS — Z79.4 LONG TERM (CURRENT) USE OF INSULIN: ICD-10-CM

## 2019-01-21 DIAGNOSIS — Z79.82 LONG TERM (CURRENT) USE OF ASPIRIN: ICD-10-CM

## 2019-01-21 DIAGNOSIS — I20.8 OTHER FORMS OF ANGINA PECTORIS: ICD-10-CM

## 2019-01-21 DIAGNOSIS — R94.39 ABNORMAL RESULT OF OTHER CARDIOVASCULAR FUNCTION STUDY: ICD-10-CM

## 2019-01-21 DIAGNOSIS — I10 ESSENTIAL (PRIMARY) HYPERTENSION: ICD-10-CM

## 2019-01-21 DIAGNOSIS — G47.33 OBSTRUCTIVE SLEEP APNEA (ADULT) (PEDIATRIC): ICD-10-CM

## 2019-01-21 DIAGNOSIS — E10.9 TYPE 1 DIABETES MELLITUS WITHOUT COMPLICATIONS: ICD-10-CM

## 2019-01-21 DIAGNOSIS — I25.118 ATHEROSCLEROTIC HEART DISEASE OF NATIVE CORONARY ARTERY WITH OTHER FORMS OF ANGINA PECTORIS: ICD-10-CM

## 2019-01-21 DIAGNOSIS — E66.9 OBESITY, UNSPECIFIED: ICD-10-CM

## 2019-01-23 PROBLEM — E66.9 OBESITY, UNSPECIFIED: Chronic | Status: ACTIVE | Noted: 2019-01-11

## 2019-01-23 PROBLEM — E10.9 TYPE 1 DIABETES MELLITUS WITHOUT COMPLICATIONS: Chronic | Status: ACTIVE | Noted: 2019-01-11

## 2019-01-23 PROBLEM — G47.33 OBSTRUCTIVE SLEEP APNEA (ADULT) (PEDIATRIC): Chronic | Status: ACTIVE | Noted: 2019-01-11

## 2019-01-25 NOTE — H&P ADULT - HISTORY OF PRESENT ILLNESS
68 year old female with PMHx DM, SHANTE, HTN, negative cardiac cath in 2006 with abnormal stress test suggestive of inferior wall ischemia. Pt was referred for cardiac cath and possible PCI. s/p angiogram  with PCI and CARLEY x2  to the RCA on 1/14/19. Now presents for PCI of LCX

## 2019-01-25 NOTE — H&P ADULT - ASSESSMENT
68 year old female with PMHx DM, SHANTE, HTN, negative cardiac cath in 2006 with abnormal stress test suggestive of inferior wall ischemia. Pt was referred for cardiac cath and possible PCI. s/p angiogram  with PCI and CARLEY x2  to the RCA on 1/14/19. Now presents for PCI of LCX 68 year old female with PMHx DM, SHANTE, HTN, negative cardiac cath in 2006 with abnormal stress test suggestive of inferior wall ischemia. Pt was referred for cardiac cath and possible PCI. s/p angiogram  with PCI and CARLEY x2  to the RCA on 1/14/19. Now presents for PCI of LCX  [facility] risks, benefits and alternatives discussed with patient. Risk discussed included, but not limited to MI, stroke, mortality, major bleeding, arrythmia, or infection. Consent obtained and signed educational material provided. Pt. verbalizes and understands pre-procedural instructions.

## 2019-01-28 ENCOUNTER — APPOINTMENT (OUTPATIENT)
Dept: CARDIOLOGY | Facility: CLINIC | Age: 69
End: 2019-01-28
Payer: MEDICARE

## 2019-01-28 LAB
ALBUMIN SERPL ELPH-MCNC: 4.4 G/DL
ALP BLD-CCNC: 78 U/L
ALT SERPL-CCNC: 18 U/L
ANION GAP SERPL CALC-SCNC: 10 MMOL/L
AST SERPL-CCNC: 19 U/L
BASOPHILS # BLD AUTO: 0.06 K/UL
BASOPHILS NFR BLD AUTO: 0.7 %
BILIRUB SERPL-MCNC: 0.6 MG/DL
BUN SERPL-MCNC: 29 MG/DL
CALCIUM SERPL-MCNC: 9.9 MG/DL
CHLORIDE SERPL-SCNC: 105 MMOL/L
CO2 SERPL-SCNC: 25 MMOL/L
CREAT SERPL-MCNC: 1.14 MG/DL
EOSINOPHIL # BLD AUTO: 0.26 K/UL
EOSINOPHIL NFR BLD AUTO: 3.1 %
GLUCOSE SERPL-MCNC: 147 MG/DL
HCT VFR BLD CALC: 44.9 %
HGB BLD-MCNC: 14.2 G/DL
IMM GRANULOCYTES NFR BLD AUTO: 0.2 %
LYMPHOCYTES # BLD AUTO: 2.44 K/UL
LYMPHOCYTES NFR BLD AUTO: 29 %
MAN DIFF?: NORMAL
MCHC RBC-ENTMCNC: 26.7 PG
MCHC RBC-ENTMCNC: 31.6 GM/DL
MCV RBC AUTO: 84.4 FL
MONOCYTES # BLD AUTO: 0.65 K/UL
MONOCYTES NFR BLD AUTO: 7.7 %
NEUTROPHILS # BLD AUTO: 4.97 K/UL
NEUTROPHILS NFR BLD AUTO: 59.3 %
PLATELET # BLD AUTO: 272 K/UL
POTASSIUM SERPL-SCNC: 5.7 MMOL/L
PROT SERPL-MCNC: 7.2 G/DL
RBC # BLD: 5.32 M/UL
RBC # FLD: 16.2 %
SODIUM SERPL-SCNC: 140 MMOL/L
WBC # FLD AUTO: 8.4 K/UL

## 2019-01-28 PROCEDURE — 93000 ELECTROCARDIOGRAM COMPLETE: CPT

## 2019-01-28 PROCEDURE — 99213 OFFICE O/P EST LOW 20 MIN: CPT | Mod: 25

## 2019-01-28 NOTE — DISCUSSION/SUMMARY
[Coronary Artery Disease] : coronary artery disease [Responding to Treatment] : responding to treatment [None] : none [Patient] : the patient [FreeTextEntry1] : Questions answered specifically regarding the value of statin therapy and amelioration of coronary disease. Discussion of diet and weight loss.

## 2019-01-28 NOTE — HISTORY OF PRESENT ILLNESS
[FreeTextEntry1] : 67 yo female here for discussion of lipid therapy and coronary intervention planned for tomorrow with Dr. Lopes. Pt underwent ETT recently and abnormal results prompted cardiac cath which revealed multi=vessel disease. PCI performed. Pt denies chest pain or shortness of breath. She has lost 10 lbs recently.

## 2019-01-28 NOTE — PHYSICAL EXAM
[General Appearance - Well Developed] : well developed [Normal Appearance] : normal appearance [Well Groomed] : well groomed [General Appearance - Well Nourished] : well nourished [No Deformities] : no deformities [General Appearance - In No Acute Distress] : no acute distress [Normal Conjunctiva] : the conjunctiva exhibited no abnormalities [Eyelids - No Xanthelasma] : the eyelids demonstrated no xanthelasmas [No Oral Pallor] : no oral pallor [No Oral Cyanosis] : no oral cyanosis [Normal Jugular Venous A Waves Present] : normal jugular venous A waves present [Normal Jugular Venous V Waves Present] : normal jugular venous V waves present [No Jugular Venous Abad A Waves] : no jugular venous abad A waves [Exaggerated Use Of Accessory Muscles For Inspiration] : no accessory muscle use [Abdomen Soft] : soft [Abdomen Tenderness] : non-tender [Abdomen Mass (___ Cm)] : no abdominal mass palpated [Abnormal Walk] : normal gait [Gait - Sufficient For Exercise Testing] : the gait was sufficient for exercise testing [Nail Clubbing] : no clubbing of the fingernails [Cyanosis, Localized] : no localized cyanosis [Petechial Hemorrhages (___cm)] : no petechial hemorrhages [Skin Color & Pigmentation] : normal skin color and pigmentation [] : no rash [No Venous Stasis] : no venous stasis [Skin Lesions] : no skin lesions [No Skin Ulcers] : no skin ulcer [No Xanthoma] : no  xanthoma was observed [Oriented To Time, Place, And Person] : oriented to person, place, and time [Affect] : the affect was normal [Mood] : the mood was normal [No Anxiety] : not feeling anxious

## 2019-01-29 ENCOUNTER — OUTPATIENT (OUTPATIENT)
Dept: OUTPATIENT SERVICES | Facility: HOSPITAL | Age: 69
LOS: 1 days | Discharge: HOME | End: 2019-01-29
Payer: MEDICARE

## 2019-01-29 VITALS
TEMPERATURE: 98 F | DIASTOLIC BLOOD PRESSURE: 61 MMHG | OXYGEN SATURATION: 99 % | SYSTOLIC BLOOD PRESSURE: 156 MMHG | HEIGHT: 67 IN | RESPIRATION RATE: 16 BRPM | HEART RATE: 69 BPM | WEIGHT: 257.06 LBS

## 2019-01-29 DIAGNOSIS — Z86.018 PERSONAL HISTORY OF OTHER BENIGN NEOPLASM: Chronic | ICD-10-CM

## 2019-01-29 DIAGNOSIS — Z98.62 PERIPHERAL VASCULAR ANGIOPLASTY STATUS: Chronic | ICD-10-CM

## 2019-01-29 LAB — ALLERGY+IMMUNOLOGY DIAG STUDY NOTE: SIGNIFICANT CHANGE UP

## 2019-01-29 PROCEDURE — 99152 MOD SED SAME PHYS/QHP 5/>YRS: CPT

## 2019-01-29 PROCEDURE — 92928 PRQ TCAT PLMT NTRAC ST 1 LES: CPT | Mod: LC

## 2019-01-29 PROCEDURE — 93010 ELECTROCARDIOGRAM REPORT: CPT

## 2019-01-29 RX ORDER — TICAGRELOR 90 MG/1
90 TABLET ORAL
Qty: 0 | Refills: 0 | Status: DISCONTINUED | OUTPATIENT
Start: 2019-01-29 | End: 2019-01-30

## 2019-01-29 RX ORDER — METOPROLOL TARTRATE 50 MG
50 TABLET ORAL DAILY
Qty: 0 | Refills: 0 | Status: DISCONTINUED | OUTPATIENT
Start: 2019-01-29 | End: 2019-01-30

## 2019-01-29 RX ORDER — MONTELUKAST 4 MG/1
1 TABLET, CHEWABLE ORAL
Qty: 0 | Refills: 0 | COMMUNITY

## 2019-01-29 RX ORDER — SODIUM CHLORIDE 9 MG/ML
1000 INJECTION INTRAMUSCULAR; INTRAVENOUS; SUBCUTANEOUS
Qty: 0 | Refills: 0 | Status: DISCONTINUED | OUTPATIENT
Start: 2019-01-29 | End: 2019-01-30

## 2019-01-29 RX ORDER — ATORVASTATIN CALCIUM 80 MG/1
40 TABLET, FILM COATED ORAL AT BEDTIME
Qty: 0 | Refills: 0 | Status: DISCONTINUED | OUTPATIENT
Start: 2019-01-29 | End: 2019-01-30

## 2019-01-29 RX ORDER — ASPIRIN/CALCIUM CARB/MAGNESIUM 324 MG
81 TABLET ORAL DAILY
Qty: 0 | Refills: 0 | Status: DISCONTINUED | OUTPATIENT
Start: 2019-01-29 | End: 2019-01-30

## 2019-01-29 RX ORDER — BUDESONIDE AND FORMOTEROL FUMARATE DIHYDRATE 160; 4.5 UG/1; UG/1
2 AEROSOL RESPIRATORY (INHALATION)
Qty: 0 | Refills: 0 | COMMUNITY

## 2019-01-29 RX ORDER — LOSARTAN POTASSIUM 100 MG/1
100 TABLET, FILM COATED ORAL DAILY
Qty: 0 | Refills: 0 | Status: DISCONTINUED | OUTPATIENT
Start: 2019-01-29 | End: 2019-01-30

## 2019-01-29 RX ADMIN — Medication 50 MILLIGRAM(S): at 18:54

## 2019-01-29 RX ADMIN — SODIUM CHLORIDE 100 MILLILITER(S): 9 INJECTION INTRAMUSCULAR; INTRAVENOUS; SUBCUTANEOUS at 11:00

## 2019-01-29 NOTE — PACU DISCHARGE NOTE - COMMENTS
patient discharged to CICU. right Radial site benign, soft nontender. dressing clean/dry/intact. IVL site benign. Patient without any complaints. Vital signs stable. post procedure instructions given and understood by patient via teach back. Will continue to monitor patient status. Report given to Narcisa NASH

## 2019-01-29 NOTE — CHART NOTE - NSCHARTNOTEFT_GEN_A_CORE
Nurse Practitioner Progress note:     HPI:  68 year old female with PMHx DM, SHANTE, HTN, negative cardiac cath in 2006 with abnormal stress test suggestive of inferior wall ischemia. Pt was referred for cardiac cath and possible PCI. s/p angiogram  with PCI and CARLEY x2  to the RCA on 1/14/19. Now presents for PCI of LCX (25 Jan 2019 15:44)    T(C): 36.6 (01-29-19 @ 07:21), Max: 36.6 (01-29-19 @ 07:21)  HR: 69 (01-29-19 @ 07:21) (69 - 69)  BP: 156/61 (01-29-19 @ 07:21) (156/61 - 156/61)  RR: 16 (01-29-19 @ 07:21) (16 - 16)  SpO2: 99% (01-29-19 @ 07:21) (99% - 99%)  Wt(kg): --    PHYSICAL EXAM:  Neurologic: Non-focal, AxOx3.  No neuro deficits  Vascular: Peripheral pulses palpable 2+ bilaterally  Procedure Site: Rt. radial band removed manual pressure applied x20 minutes site benign soft no bleeding no hematoma+1 radial pulse pressure dressing with gauze applied to site no c/o numbness/ tingling, fingers/hand warm to touch, <3sec cap refill noted     PROCEDURE RESULTS:  Cardiac Cath Lab - Adult (01.29.19 @ 08:30) >  Impression  Interventional Conclusions  Successful Coronary Intervention CARLEY of LCx.        ASSESSMENT/PLAN: 	  68 year old female with PMHx DM, SHANTE, HTN, negative cardiac cath in 2006 with abnormal stress test suggestive of inferior wall ischemia. Pt was referred for cardiac cath and possible PCI. s/p angiogram  with PCI and CARLEY x2  to the RCA on 1/14/19. S/P LHC S/P CARLEY to LCX    -Admit to CICU  -VS, labs, diet, activity as per PCI orders  -IV hydration  -Encourage PO fluids  -ASA 81mg  -Brilinta 90mg BID  -Losartan 100mg  -Toprol XL 50mg  -Lipitor 40mg  -Plan of care D/W pt. and MD  -Discussed therapeutic lifestyle changes to reduce risk factors such as following a cardiac diet, weight loss, maintaining a healthy weight, exercise, smoking cessation, medication compliance, and regular follow-up  with MD to know our numbers (BP, cholesterol, weight, and glucose  -If pt. remains stable overnight possible D/C in AM  - Follow-up AM labs/EKG/site check  -Follow-up with attending

## 2019-01-30 ENCOUNTER — TRANSCRIPTION ENCOUNTER (OUTPATIENT)
Age: 69
End: 2019-01-30

## 2019-01-30 VITALS — SYSTOLIC BLOOD PRESSURE: 154 MMHG | DIASTOLIC BLOOD PRESSURE: 55 MMHG | HEART RATE: 60 BPM

## 2019-01-30 DIAGNOSIS — Z79.82 LONG TERM (CURRENT) USE OF ASPIRIN: ICD-10-CM

## 2019-01-30 DIAGNOSIS — E10.9 TYPE 1 DIABETES MELLITUS WITHOUT COMPLICATIONS: ICD-10-CM

## 2019-01-30 DIAGNOSIS — I25.118 ATHEROSCLEROTIC HEART DISEASE OF NATIVE CORONARY ARTERY WITH OTHER FORMS OF ANGINA PECTORIS: ICD-10-CM

## 2019-01-30 DIAGNOSIS — G47.33 OBSTRUCTIVE SLEEP APNEA (ADULT) (PEDIATRIC): ICD-10-CM

## 2019-01-30 DIAGNOSIS — Z79.4 LONG TERM (CURRENT) USE OF INSULIN: ICD-10-CM

## 2019-01-30 DIAGNOSIS — E78.5 HYPERLIPIDEMIA, UNSPECIFIED: ICD-10-CM

## 2019-01-30 DIAGNOSIS — E66.9 OBESITY, UNSPECIFIED: ICD-10-CM

## 2019-01-30 DIAGNOSIS — I10 ESSENTIAL (PRIMARY) HYPERTENSION: ICD-10-CM

## 2019-01-30 LAB
ANION GAP SERPL CALC-SCNC: 5 MMOL/L — SIGNIFICANT CHANGE UP (ref 5–17)
BASOPHILS # BLD AUTO: 0.07 K/UL — SIGNIFICANT CHANGE UP (ref 0–0.2)
BASOPHILS NFR BLD AUTO: 1.2 % — SIGNIFICANT CHANGE UP (ref 0–2)
BUN SERPL-MCNC: 25 MG/DL — HIGH (ref 7–23)
CALCIUM SERPL-MCNC: 8.5 MG/DL — SIGNIFICANT CHANGE UP (ref 8.5–10.1)
CHLORIDE SERPL-SCNC: 110 MMOL/L — HIGH (ref 96–108)
CO2 SERPL-SCNC: 25 MMOL/L — SIGNIFICANT CHANGE UP (ref 22–31)
CREAT SERPL-MCNC: 1.08 MG/DL — SIGNIFICANT CHANGE UP (ref 0.5–1.3)
EOSINOPHIL # BLD AUTO: 0.36 K/UL — SIGNIFICANT CHANGE UP (ref 0–0.5)
EOSINOPHIL NFR BLD AUTO: 6 % — SIGNIFICANT CHANGE UP (ref 0–6)
GLUCOSE SERPL-MCNC: 248 MG/DL — HIGH (ref 70–99)
HCT VFR BLD CALC: 40.9 % — SIGNIFICANT CHANGE UP (ref 34.5–45)
HGB BLD-MCNC: 13 G/DL — SIGNIFICANT CHANGE UP (ref 11.5–15.5)
IMM GRANULOCYTES NFR BLD AUTO: 0.3 % — SIGNIFICANT CHANGE UP (ref 0–1.5)
LYMPHOCYTES # BLD AUTO: 1.18 K/UL — SIGNIFICANT CHANGE UP (ref 1–3.3)
LYMPHOCYTES # BLD AUTO: 19.7 % — SIGNIFICANT CHANGE UP (ref 13–44)
MCHC RBC-ENTMCNC: 26.9 PG — LOW (ref 27–34)
MCHC RBC-ENTMCNC: 31.8 GM/DL — LOW (ref 32–36)
MCV RBC AUTO: 84.5 FL — SIGNIFICANT CHANGE UP (ref 80–100)
MONOCYTES # BLD AUTO: 0.71 K/UL — SIGNIFICANT CHANGE UP (ref 0–0.9)
MONOCYTES NFR BLD AUTO: 11.8 % — SIGNIFICANT CHANGE UP (ref 2–14)
NEUTROPHILS # BLD AUTO: 3.66 K/UL — SIGNIFICANT CHANGE UP (ref 1.8–7.4)
NEUTROPHILS NFR BLD AUTO: 61 % — SIGNIFICANT CHANGE UP (ref 43–77)
NRBC # BLD: 0 /100 WBCS — SIGNIFICANT CHANGE UP (ref 0–0)
PLATELET # BLD AUTO: 202 K/UL — SIGNIFICANT CHANGE UP (ref 150–400)
POTASSIUM SERPL-MCNC: 4.5 MMOL/L — SIGNIFICANT CHANGE UP (ref 3.5–5.3)
POTASSIUM SERPL-SCNC: 4.5 MMOL/L — SIGNIFICANT CHANGE UP (ref 3.5–5.3)
RBC # BLD: 4.84 M/UL — SIGNIFICANT CHANGE UP (ref 3.8–5.2)
RBC # FLD: 15.7 % — HIGH (ref 10.3–14.5)
SODIUM SERPL-SCNC: 140 MMOL/L — SIGNIFICANT CHANGE UP (ref 135–145)
WBC # BLD: 6 K/UL — SIGNIFICANT CHANGE UP (ref 3.8–10.5)
WBC # FLD AUTO: 6 K/UL — SIGNIFICANT CHANGE UP (ref 3.8–10.5)

## 2019-01-30 PROCEDURE — 93010 ELECTROCARDIOGRAM REPORT: CPT

## 2019-01-30 RX ADMIN — TICAGRELOR 90 MILLIGRAM(S): 90 TABLET ORAL at 05:59

## 2019-01-30 NOTE — DISCHARGE NOTE ADULT - CARE PROVIDER_API CALL
Tarik Lopes (MD)  Cardiovascular Disease  [address] [place] [address]  Phone: [phone]  Fax: [phone]

## 2019-01-30 NOTE — PROGRESS NOTE ADULT - ASSESSMENT
HPI:  68 year old female with PMHx DM, SHANTE, HTN, negative cardiac cath in 2006 with abnormal stress test suggestive of inferior wall ischemia. Pt was referred for cardiac cath and possible PCI. s/p angiogram  with PCI and CARLEY x2  to the RCA on 1/14/19. Now presents for PCI of LCX (25 Jan 2019 15:44)        Patient now s/p angiogram  with PCI and CARLEY to the LCX    - AM labs and EKG reviewed   - procedure, outcome and f/u care reviewed with patient  - continue ASA/ ticagrelor   - continue statin/ ARB  - DC home today  - f/u with MD in 4-7 days of discharge

## 2019-01-30 NOTE — PROGRESS NOTE ADULT - SUBJECTIVE AND OBJECTIVE BOX
Cardiology NP     Patient is a 68y old  Female who presented for staged PCI,  s/p CARLEY to the LCX (30 Jan 2019 06:16)      HPI:  68 year old female with PMHx DM, SHANTE, HTN, negative cardiac cath in 2006 with abnormal stress test suggestive of inferior wall ischemia. Pt was referred for cardiac cath and possible PCI. s/p angiogram  with PCI and CARLEY x2  to the RCA on 1/14/19. Now presents for PCI of LCX (25 Jan 2019 15:44)      PAST MEDICAL & SURGICAL HISTORY:  Diabetes, type I  Obesity  SHANTE on CPAP  History of angioplasty: PCI of RCA  H/O lipoma: removal      MEDICATIONS  (STANDING):  aspirin  chewable 81 milliGRAM(s) Oral daily  atorvastatin 40 milliGRAM(s) Oral at bedtime  losartan 100 milliGRAM(s) Oral daily  metoprolol succinate ER 50 milliGRAM(s) Oral daily  sodium chloride 0.9%. 1000 milliLiter(s) (100 mL/Hr) IV Continuous <Continuous>  ticagrelor 90 milliGRAM(s) Oral two times a day    MEDICATIONS  (PRN):      Allergies    No Known Allergies      REVIEW OF SYSTEMS: As mentioned in HPI all others Negative     Vital Signs Last 24 Hrs  T(C): 37.4 (29 Jan 2019 20:18), Max: 37.4 (29 Jan 2019 20:18)  T(F): 99.3 (29 Jan 2019 20:18), Max: 99.3 (29 Jan 2019 20:18)  HR: 58 (30 Jan 2019 05:17) (54 - 79)  BP: 129/59 (30 Jan 2019 05:17) (98/57 - 156/61)  BP(mean): 76 (30 Jan 2019 05:17) (61 - 76)  RR: 20 (30 Jan 2019 00:23) (16 - 23)  SpO2: 96% (30 Jan 2019 05:17) (95% - 100%)    PHYSICAL EXAM:  NERVOUS SYSTEM:  Alert & Oriented X3, Good concentration  CHEST/LUNG: Clear to auscultation bilaterally; No rales, rhonchi, wheezing, or rubs  HEART: Regular rate and rhythm; No murmurs, rubs, or gallops  ABDOMEN: Soft, Nontender, Nondistended; Bowel sounds present  EXTREMITIES:  2+ Peripheral Pulses, No clubbing, cyanosis, or edema  SKIN: right radial cath site without bleeding or hematoma.    LABS:                        13.0   6.00  )-----------( 202      ( 30 Jan 2019 05:35 )             40.9     01-30    140  |  110<H>  |  25<H>  ----------------------------<  248<H>  4.5   |  25  |  1.08    Ca    8.5      30 Jan 2019 05:35        EKG- SB 56 bpm, unchanged

## 2019-01-30 NOTE — DISCHARGE NOTE ADULT - CARE PLAN
Principal Discharge DX:	History of angioplasty  Goal:	symptom free  Assessment and plan of treatment:	as per post cath

## 2019-01-30 NOTE — DISCHARGE NOTE ADULT - PATIENT PORTAL LINK FT
You can access the [email or web address] Patient Portal, offered by [facility], by registering with the following website: [email or web address]

## 2019-03-08 ENCOUNTER — APPOINTMENT (OUTPATIENT)
Dept: CARDIOLOGY | Facility: CLINIC | Age: 69
End: 2019-03-08
Payer: MEDICARE

## 2019-03-08 ENCOUNTER — RX CHANGE (OUTPATIENT)
Age: 69
End: 2019-03-08

## 2019-03-08 ENCOUNTER — RX RENEWAL (OUTPATIENT)
Age: 69
End: 2019-03-08

## 2019-03-08 ENCOUNTER — NON-APPOINTMENT (OUTPATIENT)
Age: 69
End: 2019-03-08

## 2019-03-08 VITALS
HEART RATE: 62 BPM | DIASTOLIC BLOOD PRESSURE: 72 MMHG | SYSTOLIC BLOOD PRESSURE: 145 MMHG | BODY MASS INDEX: 40.32 KG/M2 | WEIGHT: 266 LBS | HEIGHT: 68 IN | OXYGEN SATURATION: 98 %

## 2019-03-08 PROCEDURE — 99214 OFFICE O/P EST MOD 30 MIN: CPT | Mod: 25

## 2019-03-08 PROCEDURE — 93000 ELECTROCARDIOGRAM COMPLETE: CPT

## 2019-03-09 NOTE — DISCUSSION/SUMMARY
[Coronary Artery Disease] : coronary artery disease [Responding to Treatment] : responding to treatment [None] : none [Patient] : the patient [FreeTextEntry1] : Pt will continue antiplatelets and statin. Pt was counseled in further weight reduction and exercise as tolerated.

## 2019-03-09 NOTE — HISTORY OF PRESENT ILLNESS
[FreeTextEntry1] : 69 yo female here for follow up after PCI. Pt has continued to lose a modest amount of weight. Medications were reviewed. Pt denies chest pain or shortness of breath.\par

## 2019-03-11 ENCOUNTER — MEDICATION RENEWAL (OUTPATIENT)
Age: 69
End: 2019-03-11

## 2019-03-12 ENCOUNTER — RX RENEWAL (OUTPATIENT)
Age: 69
End: 2019-03-12

## 2019-03-12 ENCOUNTER — MEDICATION RENEWAL (OUTPATIENT)
Age: 69
End: 2019-03-12

## 2019-03-13 ENCOUNTER — RX RENEWAL (OUTPATIENT)
Age: 69
End: 2019-03-13

## 2019-03-19 ENCOUNTER — RX RENEWAL (OUTPATIENT)
Age: 69
End: 2019-03-19

## 2019-05-22 ENCOUNTER — RX RENEWAL (OUTPATIENT)
Age: 69
End: 2019-05-22

## 2019-05-29 ENCOUNTER — NON-APPOINTMENT (OUTPATIENT)
Age: 69
End: 2019-05-29

## 2019-05-29 ENCOUNTER — MEDICATION RENEWAL (OUTPATIENT)
Age: 69
End: 2019-05-29

## 2019-05-29 ENCOUNTER — APPOINTMENT (OUTPATIENT)
Dept: CARDIOLOGY | Facility: CLINIC | Age: 69
End: 2019-05-29
Payer: MEDICARE

## 2019-05-29 VITALS
DIASTOLIC BLOOD PRESSURE: 76 MMHG | SYSTOLIC BLOOD PRESSURE: 176 MMHG | HEIGHT: 68 IN | WEIGHT: 260 LBS | OXYGEN SATURATION: 98 % | BODY MASS INDEX: 39.4 KG/M2 | HEART RATE: 75 BPM

## 2019-05-29 DIAGNOSIS — Z86.39 PERSONAL HISTORY OF OTHER ENDOCRINE, NUTRITIONAL AND METABOLIC DISEASE: ICD-10-CM

## 2019-05-29 PROCEDURE — 99214 OFFICE O/P EST MOD 30 MIN: CPT | Mod: 25

## 2019-05-29 PROCEDURE — 93000 ELECTROCARDIOGRAM COMPLETE: CPT

## 2019-05-29 NOTE — DISCUSSION/SUMMARY
[FreeTextEntry1] : CAD/s/pPCI/hyperlipiemia: Will get copy of blood work from endo.  Low fat diet and exercise discussed.  Will arrange for lipitor through insurance co. Stable without active CAD symptoms. F/u with Dr. Singleton in 6 months.

## 2019-05-29 NOTE — HISTORY OF PRESENT ILLNESS
[FreeTextEntry1] : Had reverse reaction again to generic atorvastatin as she had been on lipitor and had no issues until 2 days ago when she got atorvastatin and had this reaction(flushing and itching).  Will have them file paperwork to get lipitor again as she tolerated this in past. No chest pain. No SOB.

## 2019-05-29 NOTE — PHYSICAL EXAM
[General Appearance - Well Developed] : well developed [Normal Appearance] : normal appearance [General Appearance - Well Nourished] : well nourished [Normal Oral Mucosa] : normal oral mucosa [Normal Conjunctiva] : the conjunctiva exhibited no abnormalities [Normal Oropharynx] : normal oropharynx [Normal Jugular Venous A Waves Present] : normal jugular venous A waves present [Normal Jugular Venous V Waves Present] : normal jugular venous V waves present [Auscultation Breath Sounds / Voice Sounds] : lungs were clear to auscultation bilaterally [Heart Rate And Rhythm] : heart rate and rhythm were normal [Murmurs] : no murmurs present [Heart Sounds] : normal S1 and S2 [Abdomen Soft] : soft [Bowel Sounds] : normal bowel sounds [Abnormal Walk] : normal gait [Cyanosis, Localized] : no localized cyanosis [Nail Clubbing] : no clubbing of the fingernails [] : no rash [Oriented To Time, Place, And Person] : oriented to person, place, and time [No Anxiety] : not feeling anxious

## 2019-05-30 ENCOUNTER — MEDICATION RENEWAL (OUTPATIENT)
Age: 69
End: 2019-05-30

## 2019-07-09 ENCOUNTER — RX RENEWAL (OUTPATIENT)
Age: 69
End: 2019-07-09

## 2019-07-09 ENCOUNTER — MEDICATION RENEWAL (OUTPATIENT)
Age: 69
End: 2019-07-09

## 2019-11-25 ENCOUNTER — APPOINTMENT (OUTPATIENT)
Dept: CARDIOLOGY | Facility: CLINIC | Age: 69
End: 2019-11-25

## 2019-12-18 ENCOUNTER — MEDICATION RENEWAL (OUTPATIENT)
Age: 69
End: 2019-12-18

## 2019-12-18 RX ORDER — ATORVASTATIN CALCIUM 40 MG/1
40 TABLET, FILM COATED ORAL
Qty: 90 | Refills: 3 | Status: DISCONTINUED | COMMUNITY
Start: 2019-07-09 | End: 2019-12-18

## 2019-12-19 ENCOUNTER — MEDICATION RENEWAL (OUTPATIENT)
Age: 69
End: 2019-12-19

## 2019-12-20 ENCOUNTER — RX RENEWAL (OUTPATIENT)
Age: 69
End: 2019-12-20

## 2020-01-20 ENCOUNTER — APPOINTMENT (OUTPATIENT)
Dept: CARDIOLOGY | Facility: CLINIC | Age: 70
End: 2020-01-20

## 2020-02-27 ENCOUNTER — APPOINTMENT (OUTPATIENT)
Dept: CARDIOLOGY | Facility: CLINIC | Age: 70
End: 2020-02-27

## 2020-03-27 LAB
APPEARANCE: ABNORMAL
BACTERIA: ABNORMAL
BILIRUBIN URINE: ABNORMAL
BLOOD URINE: ABNORMAL
COLOR: ABNORMAL
GLUCOSE QUALITATIVE U: NEGATIVE
HYALINE CASTS: 2 /LPF
KETONES URINE: NEGATIVE
LEUKOCYTE ESTERASE URINE: ABNORMAL
MICROSCOPIC-UA: NORMAL
NITRITE URINE: POSITIVE
PH URINE: 6
PROTEIN URINE: ABNORMAL
RED BLOOD CELLS URINE: 19 /HPF
SPECIFIC GRAVITY URINE: 1.02
SQUAMOUS EPITHELIAL CELLS: 1 /HPF
URINE COMMENTS: NORMAL
UROBILINOGEN URINE: ABNORMAL
WHITE BLOOD CELLS URINE: >720 /HPF

## 2020-05-11 ENCOUNTER — APPOINTMENT (OUTPATIENT)
Dept: CARDIOLOGY | Facility: CLINIC | Age: 70
End: 2020-05-11

## 2020-07-29 ENCOUNTER — APPOINTMENT (OUTPATIENT)
Dept: CARDIOLOGY | Facility: CLINIC | Age: 70
End: 2020-07-29

## 2020-08-06 ENCOUNTER — APPOINTMENT (OUTPATIENT)
Dept: CARDIOLOGY | Facility: CLINIC | Age: 70
End: 2020-08-06
Payer: MEDICARE

## 2020-08-06 ENCOUNTER — NON-APPOINTMENT (OUTPATIENT)
Age: 70
End: 2020-08-06

## 2020-08-06 VITALS
SYSTOLIC BLOOD PRESSURE: 178 MMHG | BODY MASS INDEX: 39.08 KG/M2 | OXYGEN SATURATION: 98 % | HEART RATE: 65 BPM | WEIGHT: 257 LBS | DIASTOLIC BLOOD PRESSURE: 82 MMHG

## 2020-08-06 PROCEDURE — 93000 ELECTROCARDIOGRAM COMPLETE: CPT

## 2020-08-06 PROCEDURE — 99203 OFFICE O/P NEW LOW 30 MIN: CPT

## 2020-08-06 RX ORDER — CIPROFLOXACIN HYDROCHLORIDE 500 MG/1
500 TABLET, FILM COATED ORAL
Qty: 6 | Refills: 0 | Status: DISCONTINUED | COMMUNITY
Start: 2020-03-27 | End: 2020-08-06

## 2020-08-06 RX ORDER — PHENAZOPYRIDINE HYDROCHLORIDE 200 MG/1
200 TABLET ORAL 3 TIMES DAILY
Qty: 9 | Refills: 0 | Status: DISCONTINUED | COMMUNITY
Start: 2020-03-27 | End: 2020-08-06

## 2020-08-06 RX ORDER — MONTELUKAST 10 MG/1
10 TABLET, FILM COATED ORAL
Qty: 30 | Refills: 0 | Status: DISCONTINUED | COMMUNITY
End: 2020-08-06

## 2020-08-06 RX ORDER — INSULIN ASPART 100 [IU]/ML
100 INJECTION, SOLUTION INTRAVENOUS; SUBCUTANEOUS
Refills: 0 | Status: ACTIVE | COMMUNITY

## 2020-08-06 RX ORDER — INSULIN DEGLUDEC INJECTION 100 U/ML
100 INJECTION, SOLUTION SUBCUTANEOUS
Refills: 0 | Status: ACTIVE | COMMUNITY

## 2020-08-07 NOTE — PHYSICAL EXAM
[General Appearance - Well Developed] : well developed [Well Groomed] : well groomed [Normal Appearance] : normal appearance [General Appearance - Well Nourished] : well nourished [No Deformities] : no deformities [General Appearance - In No Acute Distress] : no acute distress [Normal Conjunctiva] : the conjunctiva exhibited no abnormalities [Eyelids - No Xanthelasma] : the eyelids demonstrated no xanthelasmas [Normal Oral Mucosa] : normal oral mucosa [No Oral Pallor] : no oral pallor [No Oral Cyanosis] : no oral cyanosis [Normal Jugular Venous V Waves Present] : normal jugular venous V waves present [Normal Jugular Venous A Waves Present] : normal jugular venous A waves present [Respiration, Rhythm And Depth] : normal respiratory rhythm and effort [No Jugular Venous Abad A Waves] : no jugular venous abad A waves [Auscultation Breath Sounds / Voice Sounds] : lungs were clear to auscultation bilaterally [Heart Rate And Rhythm] : heart rate and rhythm were normal [Exaggerated Use Of Accessory Muscles For Inspiration] : no accessory muscle use [Heart Sounds] : normal S1 and S2 [Arterial Pulses Normal] : the arterial pulses were normal [Murmurs] : no murmurs present [Abdomen Tenderness] : non-tender [Abdomen Soft] : soft [Edema] : no peripheral edema present [Abdomen Mass (___ Cm)] : no abdominal mass palpated [Nail Clubbing] : no clubbing of the fingernails [Abnormal Walk] : normal gait [Gait - Sufficient For Exercise Testing] : the gait was sufficient for exercise testing [Cyanosis, Localized] : no localized cyanosis [Petechial Hemorrhages (___cm)] : no petechial hemorrhages [Skin Color & Pigmentation] : normal skin color and pigmentation [No Venous Stasis] : no venous stasis [] : no rash [No Xanthoma] : no  xanthoma was observed [No Skin Ulcers] : no skin ulcer [Skin Lesions] : no skin lesions [Oriented To Time, Place, And Person] : oriented to person, place, and time [Mood] : the mood was normal [Affect] : the affect was normal [No Anxiety] : not feeling anxious [FreeTextEntry1] : No JVD. No carotid bruits

## 2020-08-07 NOTE — DISCUSSION/SUMMARY
[FreeTextEntry1] : No angina\par BP adequate\par No change to meds\par Encouraged endocrine f/u and weight loss\par Briefly discussed bariatric surgery\par No need for ischemic eval at this time since she is without angina or symptoms.\par

## 2020-08-07 NOTE — HISTORY OF PRESENT ILLNESS
[FreeTextEntry1] : Trudy is seeing me for a second opinion.\par Her chart and history has been reviewed\par \par She notes no CP, SOB, HAYDEN, orthopnea, LE edema, palps, syncope or near syncope\par She is compliant with her meds and notes she struggles with her weight\par She takes her dog for walks (~ 0.5 miles) without symptoms - but does not exercise regularly.\par

## 2020-08-07 NOTE — REASON FOR VISIT
[Initial Evaluation] : an initial evaluation of [Coronary Artery Disease] : coronary artery disease [Hyperlipidemia] : hyperlipidemia [Hypertension] : hypertension [Medication Management] : Medication management

## 2020-10-09 ENCOUNTER — APPOINTMENT (OUTPATIENT)
Dept: UROLOGY | Facility: CLINIC | Age: 70
End: 2020-10-09
Payer: MEDICARE

## 2020-10-09 VITALS — TEMPERATURE: 96.7 F

## 2020-10-09 VITALS — TEMPERATURE: 97.4 F

## 2020-10-09 DIAGNOSIS — Z86.39 PERSONAL HISTORY OF OTHER ENDOCRINE, NUTRITIONAL AND METABOLIC DISEASE: ICD-10-CM

## 2020-10-09 PROCEDURE — 99214 OFFICE O/P EST MOD 30 MIN: CPT

## 2020-10-10 LAB
APPEARANCE: ABNORMAL
BACTERIA: ABNORMAL
BILIRUBIN URINE: NEGATIVE
BLOOD URINE: NEGATIVE
COLOR: NORMAL
GLUCOSE QUALITATIVE U: ABNORMAL
HYALINE CASTS: 1 /LPF
KETONES URINE: NEGATIVE
LEUKOCYTE ESTERASE URINE: ABNORMAL
MICROSCOPIC-UA: NORMAL
NITRITE URINE: NEGATIVE
PH URINE: 6
PROTEIN URINE: NORMAL
RED BLOOD CELLS URINE: 2 /HPF
SPECIFIC GRAVITY URINE: 1.02
SQUAMOUS EPITHELIAL CELLS: 3 /HPF
UROBILINOGEN URINE: NORMAL
WHITE BLOOD CELLS URINE: 235 /HPF

## 2020-12-04 ENCOUNTER — RX RENEWAL (OUTPATIENT)
Age: 70
End: 2020-12-04

## 2021-01-14 ENCOUNTER — APPOINTMENT (OUTPATIENT)
Dept: CARDIOLOGY | Facility: CLINIC | Age: 71
End: 2021-01-14

## 2021-05-27 ENCOUNTER — NON-APPOINTMENT (OUTPATIENT)
Age: 71
End: 2021-05-27

## 2021-05-27 ENCOUNTER — APPOINTMENT (OUTPATIENT)
Dept: CARDIOLOGY | Facility: CLINIC | Age: 71
End: 2021-05-27
Payer: MEDICARE

## 2021-05-27 VITALS
HEART RATE: 69 BPM | DIASTOLIC BLOOD PRESSURE: 66 MMHG | SYSTOLIC BLOOD PRESSURE: 155 MMHG | OXYGEN SATURATION: 99 % | HEIGHT: 68 IN

## 2021-05-27 PROCEDURE — 93000 ELECTROCARDIOGRAM COMPLETE: CPT

## 2021-05-27 PROCEDURE — 99213 OFFICE O/P EST LOW 20 MIN: CPT

## 2021-05-27 RX ORDER — ATORVASTATIN CALCIUM 40 MG/1
40 TABLET, FILM COATED ORAL DAILY
Qty: 90 | Refills: 3 | Status: DISCONTINUED | COMMUNITY
Start: 1900-01-01 | End: 2021-05-27

## 2021-08-31 ENCOUNTER — APPOINTMENT (OUTPATIENT)
Dept: CV DIAGNOSTICS | Facility: HOSPITAL | Age: 71
End: 2021-08-31

## 2021-09-22 ENCOUNTER — APPOINTMENT (OUTPATIENT)
Dept: UROLOGY | Facility: CLINIC | Age: 71
End: 2021-09-22
Payer: MEDICARE

## 2021-09-22 ENCOUNTER — NON-APPOINTMENT (OUTPATIENT)
Age: 71
End: 2021-09-22

## 2021-09-22 LAB
APPEARANCE: ABNORMAL
BACTERIA UR CULT: ABNORMAL
BACTERIA: NEGATIVE
BILIRUBIN URINE: ABNORMAL
BLOOD URINE: ABNORMAL
COLOR: ABNORMAL
GLUCOSE QUALITATIVE U: NEGATIVE
HYALINE CASTS: 2 /LPF
KETONES URINE: NEGATIVE
LEUKOCYTE ESTERASE URINE: ABNORMAL
MICROSCOPIC-UA: NORMAL
NITRITE URINE: NEGATIVE
PH URINE: 7
PROTEIN URINE: ABNORMAL
RED BLOOD CELLS URINE: 12 /HPF
SPECIFIC GRAVITY URINE: 1.02
SQUAMOUS EPITHELIAL CELLS: 1 /HPF
UROBILINOGEN URINE: NORMAL
WHITE BLOOD CELLS URINE: >720 /HPF

## 2021-09-22 PROCEDURE — 99214 OFFICE O/P EST MOD 30 MIN: CPT

## 2021-09-27 ENCOUNTER — NON-APPOINTMENT (OUTPATIENT)
Age: 71
End: 2021-09-27

## 2021-09-27 LAB
APPEARANCE: CLEAR
BACTERIA UR CULT: NORMAL
BACTERIA: NEGATIVE
BILIRUBIN URINE: NEGATIVE
BLOOD URINE: NEGATIVE
COLOR: YELLOW
GLUCOSE QUALITATIVE U: NEGATIVE
HYALINE CASTS: 0 /LPF
KETONES URINE: NEGATIVE
LEUKOCYTE ESTERASE URINE: ABNORMAL
MICROSCOPIC-UA: NORMAL
NITRITE URINE: NEGATIVE
PH URINE: 6.5
PROTEIN URINE: NORMAL
RED BLOOD CELLS URINE: 3 /HPF
SPECIFIC GRAVITY URINE: 1.02
SQUAMOUS EPITHELIAL CELLS: 12 /HPF
UROBILINOGEN URINE: NORMAL
WHITE BLOOD CELLS URINE: 25 /HPF

## 2021-11-02 ENCOUNTER — LABORATORY RESULT (OUTPATIENT)
Age: 71
End: 2021-11-02

## 2021-11-02 RX ORDER — PHENAZOPYRIDINE 100 MG/1
100 TABLET, FILM COATED ORAL EVERY 6 HOURS
Qty: 90 | Refills: 3 | Status: ACTIVE | COMMUNITY
Start: 2021-09-22 | End: 1900-01-01

## 2021-11-04 ENCOUNTER — APPOINTMENT (OUTPATIENT)
Dept: CV DIAGNOSTICS | Facility: HOSPITAL | Age: 71
End: 2021-11-04

## 2021-11-05 ENCOUNTER — NON-APPOINTMENT (OUTPATIENT)
Age: 71
End: 2021-11-05

## 2021-11-08 ENCOUNTER — NON-APPOINTMENT (OUTPATIENT)
Age: 71
End: 2021-11-08

## 2021-11-19 LAB — BACTERIA UR CULT: ABNORMAL

## 2021-12-06 ENCOUNTER — NON-APPOINTMENT (OUTPATIENT)
Age: 71
End: 2021-12-06

## 2021-12-07 ENCOUNTER — NON-APPOINTMENT (OUTPATIENT)
Age: 71
End: 2021-12-07

## 2021-12-08 ENCOUNTER — NON-APPOINTMENT (OUTPATIENT)
Age: 71
End: 2021-12-08

## 2021-12-20 ENCOUNTER — FORM ENCOUNTER (OUTPATIENT)
Age: 71
End: 2021-12-20

## 2021-12-20 LAB
APPEARANCE: ABNORMAL
BACTERIA UR CULT: ABNORMAL
BACTERIA: NEGATIVE
BILIRUBIN URINE: NEGATIVE
BLOOD URINE: NORMAL
COLOR: NORMAL
GLUCOSE QUALITATIVE U: ABNORMAL
HYALINE CASTS: 0 /LPF
KETONES URINE: NEGATIVE
LEUKOCYTE ESTERASE URINE: ABNORMAL
MICROSCOPIC-UA: NORMAL
NITRITE URINE: NEGATIVE
PH URINE: 6
PROTEIN URINE: NORMAL
RED BLOOD CELLS URINE: 8 /HPF
SPECIFIC GRAVITY URINE: 1.03
SQUAMOUS EPITHELIAL CELLS: 1 /HPF
URINE COMMENTS: NORMAL
UROBILINOGEN URINE: NORMAL
WHITE BLOOD CELLS URINE: 394 /HPF

## 2021-12-27 ENCOUNTER — NON-APPOINTMENT (OUTPATIENT)
Age: 71
End: 2021-12-27

## 2022-01-12 NOTE — PROGRESS NOTE ADULT - PROBLEM SELECTOR PROBLEM 1
Spoke to patient, relayed information in Dr Ceja Puls note 1/12/22. Patient states is done with physical therapy. Would like order for further PT, feels it is helping. Asking if can order for PT without having to come into office to see Dr Yasmine Rosas.    LOV CAD (coronary artery disease)

## 2022-03-10 ENCOUNTER — NON-APPOINTMENT (OUTPATIENT)
Age: 72
End: 2022-03-10

## 2022-03-11 ENCOUNTER — NON-APPOINTMENT (OUTPATIENT)
Age: 72
End: 2022-03-11

## 2022-03-11 LAB
APPEARANCE: ABNORMAL
BACTERIA: NEGATIVE
BILIRUBIN URINE: NEGATIVE
BLOOD URINE: ABNORMAL
COLOR: YELLOW
GLUCOSE QUALITATIVE U: ABNORMAL
HYALINE CASTS: 2 /LPF
KETONES URINE: NEGATIVE
LEUKOCYTE ESTERASE URINE: ABNORMAL
MICROSCOPIC-UA: NORMAL
NITRITE URINE: NEGATIVE
PH URINE: 6
PROTEIN URINE: ABNORMAL
RED BLOOD CELLS URINE: 66 /HPF
SPECIFIC GRAVITY URINE: 1.02
SQUAMOUS EPITHELIAL CELLS: 1 /HPF
UROBILINOGEN URINE: NORMAL
WHITE BLOOD CELLS URINE: >720 /HPF

## 2022-03-14 LAB — BACTERIA UR CULT: ABNORMAL

## 2022-03-29 ENCOUNTER — APPOINTMENT (OUTPATIENT)
Dept: UROLOGY | Facility: CLINIC | Age: 72
End: 2022-03-29

## 2022-09-06 ENCOUNTER — RX RENEWAL (OUTPATIENT)
Age: 72
End: 2022-09-06

## 2022-09-14 ENCOUNTER — APPOINTMENT (OUTPATIENT)
Dept: UROLOGY | Facility: CLINIC | Age: 72
End: 2022-09-14

## 2022-09-29 ENCOUNTER — APPOINTMENT (OUTPATIENT)
Dept: UROLOGY | Facility: CLINIC | Age: 72
End: 2022-09-29

## 2022-09-29 VITALS — HEART RATE: 75 BPM | SYSTOLIC BLOOD PRESSURE: 176 MMHG | DIASTOLIC BLOOD PRESSURE: 93 MMHG

## 2022-09-29 PROCEDURE — 99214 OFFICE O/P EST MOD 30 MIN: CPT

## 2022-09-29 PROCEDURE — 51798 US URINE CAPACITY MEASURE: CPT

## 2022-09-29 RX ORDER — CIPROFLOXACIN HYDROCHLORIDE 500 MG/1
500 TABLET, FILM COATED ORAL TWICE DAILY
Qty: 14 | Refills: 0 | Status: DISCONTINUED | COMMUNITY
Start: 2021-11-05 | End: 2022-09-29

## 2022-09-29 RX ORDER — AMOXICILLIN AND CLAVULANATE POTASSIUM 875; 125 MG/1; MG/1
875-125 TABLET, COATED ORAL
Qty: 10 | Refills: 0 | Status: DISCONTINUED | COMMUNITY
Start: 2021-11-18 | End: 2022-09-29

## 2022-09-29 RX ORDER — SULFAMETHOXAZOLE AND TRIMETHOPRIM 800; 160 MG/1; MG/1
800-160 TABLET ORAL
Qty: 14 | Refills: 2 | Status: DISCONTINUED | COMMUNITY
Start: 2020-10-09 | End: 2022-09-29

## 2022-09-30 LAB
APPEARANCE: CLEAR
BACTERIA UR CULT: ABNORMAL
BACTERIA: NEGATIVE
BILIRUBIN URINE: NEGATIVE
BLOOD URINE: NEGATIVE
COLOR: YELLOW
GLUCOSE QUALITATIVE U: ABNORMAL
HYALINE CASTS: 0 /LPF
KETONES URINE: ABNORMAL
LEUKOCYTE ESTERASE URINE: ABNORMAL
MICROSCOPIC-UA: NORMAL
NITRITE URINE: NEGATIVE
PH URINE: 6
PROTEIN URINE: NEGATIVE
RED BLOOD CELLS URINE: 1 /HPF
SPECIFIC GRAVITY URINE: 1.01
SQUAMOUS EPITHELIAL CELLS: 0 /HPF
UROBILINOGEN URINE: NORMAL
WHITE BLOOD CELLS URINE: 4 /HPF

## 2022-10-03 LAB — BACTERIA UR CULT: ABNORMAL

## 2022-10-10 ENCOUNTER — NON-APPOINTMENT (OUTPATIENT)
Age: 72
End: 2022-10-10

## 2022-10-11 ENCOUNTER — NON-APPOINTMENT (OUTPATIENT)
Age: 72
End: 2022-10-11

## 2022-10-12 ENCOUNTER — NON-APPOINTMENT (OUTPATIENT)
Age: 72
End: 2022-10-12

## 2022-10-12 LAB
APPEARANCE: ABNORMAL
BACTERIA UR CULT: ABNORMAL
BACTERIA: ABNORMAL
BILIRUBIN URINE: NEGATIVE
BLOOD URINE: ABNORMAL
COLOR: NORMAL
GLUCOSE QUALITATIVE U: ABNORMAL
HYALINE CASTS: 0 /LPF
KETONES URINE: NEGATIVE
LEUKOCYTE ESTERASE URINE: ABNORMAL
MICROSCOPIC-UA: NORMAL
NITRITE URINE: NEGATIVE
PH URINE: 6
PROTEIN URINE: NORMAL
RED BLOOD CELLS URINE: 8 /HPF
SPECIFIC GRAVITY URINE: 1.03
SQUAMOUS EPITHELIAL CELLS: 2 /HPF
URINE COMMENTS: NORMAL
UROBILINOGEN URINE: NORMAL
WHITE BLOOD CELLS URINE: 379 /HPF

## 2022-10-18 LAB
APPEARANCE: ABNORMAL
BACTERIA UR CULT: ABNORMAL
BACTERIA: NEGATIVE
BILIRUBIN URINE: NEGATIVE
BLOOD URINE: NEGATIVE
COLOR: ABNORMAL
GLUCOSE QUALITATIVE U: NEGATIVE
HYALINE CASTS: 3 /LPF
KETONES URINE: NEGATIVE
LEUKOCYTE ESTERASE URINE: ABNORMAL
MICROSCOPIC-UA: NORMAL
NITRITE URINE: NEGATIVE
PH URINE: 6
PROTEIN URINE: NORMAL
RED BLOOD CELLS URINE: 3 /HPF
SPECIFIC GRAVITY URINE: 1.02
SQUAMOUS EPITHELIAL CELLS: 0 /HPF
UROBILINOGEN URINE: NORMAL
WHITE BLOOD CELLS URINE: 162 /HPF

## 2022-10-26 ENCOUNTER — NON-APPOINTMENT (OUTPATIENT)
Age: 72
End: 2022-10-26

## 2022-10-26 ENCOUNTER — APPOINTMENT (OUTPATIENT)
Dept: CARDIOLOGY | Facility: CLINIC | Age: 72
End: 2022-10-26

## 2022-10-26 VITALS
HEART RATE: 75 BPM | HEIGHT: 68 IN | OXYGEN SATURATION: 98 % | SYSTOLIC BLOOD PRESSURE: 176 MMHG | DIASTOLIC BLOOD PRESSURE: 74 MMHG

## 2022-10-26 VITALS — DIASTOLIC BLOOD PRESSURE: 70 MMHG | SYSTOLIC BLOOD PRESSURE: 161 MMHG

## 2022-10-26 PROCEDURE — 99214 OFFICE O/P EST MOD 30 MIN: CPT

## 2022-10-26 PROCEDURE — 93000 ELECTROCARDIOGRAM COMPLETE: CPT

## 2022-10-26 RX ORDER — PHENAZOPYRIDINE 200 MG/1
200 TABLET, FILM COATED ORAL 3 TIMES DAILY
Qty: 6 | Refills: 0 | Status: DISCONTINUED | COMMUNITY
Start: 2022-09-17 | End: 2022-10-26

## 2022-10-26 RX ORDER — BLOOD SUGAR DIAGNOSTIC
STRIP MISCELLANEOUS
Qty: 300 | Refills: 0 | Status: COMPLETED | COMMUNITY
Start: 2022-03-12

## 2022-10-26 RX ORDER — TICAGRELOR 90 MG/1
90 TABLET ORAL
Qty: 180 | Refills: 3 | Status: DISCONTINUED | COMMUNITY
Start: 2019-12-18 | End: 2022-10-26

## 2022-10-26 RX ORDER — INSULIN DEGLUDEC INJECTION 100 U/ML
100 INJECTION, SOLUTION SUBCUTANEOUS
Qty: 45 | Refills: 0 | Status: ACTIVE | COMMUNITY
Start: 2022-08-30

## 2022-10-26 RX ORDER — CEPHALEXIN 500 MG/1
500 CAPSULE ORAL 4 TIMES DAILY
Qty: 28 | Refills: 0 | Status: DISCONTINUED | COMMUNITY
Start: 2021-11-05 | End: 2022-10-26

## 2022-10-26 RX ORDER — PHENAZOPYRIDINE 100 MG/1
100 TABLET, FILM COATED ORAL 3 TIMES DAILY
Qty: 60 | Refills: 0 | Status: DISCONTINUED | COMMUNITY
Start: 2021-11-05 | End: 2022-10-26

## 2022-10-26 RX ORDER — LANCETS 33 GAUGE
EACH MISCELLANEOUS
Qty: 100 | Refills: 0 | Status: COMPLETED | COMMUNITY
Start: 2022-05-26

## 2022-10-26 RX ORDER — NITROFURANTOIN (MONOHYDRATE/MACROCRYSTALS) 25; 75 MG/1; MG/1
100 CAPSULE ORAL
Qty: 28 | Refills: 0 | Status: COMPLETED | COMMUNITY
Start: 2022-09-06

## 2022-10-26 RX ORDER — FOSFOMYCIN TROMETHAMINE 3 G/1
3 POWDER ORAL
Qty: 6 | Refills: 1 | Status: DISCONTINUED | COMMUNITY
Start: 2022-09-29 | End: 2022-10-26

## 2022-10-26 RX ORDER — METHENAMINE, SODIUM PHOSPHATE, MONOBASIC, MONOHYDRATE, PHENYL SALICYLATE, METHYLENE BLUE, AND HYOSCYAMINE SULFATE 118; 40.8; 36; 10; .12 MG/1; MG/1; MG/1; MG/1; MG/1
118 CAPSULE ORAL
Qty: 60 | Refills: 1 | Status: DISCONTINUED | COMMUNITY
Start: 2020-03-27 | End: 2022-10-26

## 2022-10-26 RX ORDER — AMOXICILLIN AND CLAVULANATE POTASSIUM 875; 125 MG/1; MG/1
875-125 TABLET, COATED ORAL
Qty: 14 | Refills: 0 | Status: DISCONTINUED | COMMUNITY
Start: 2022-09-17 | End: 2022-10-26

## 2022-10-26 RX ORDER — PEN NEEDLE, DIABETIC 32GX 5/32"
32G X 4 MM NEEDLE, DISPOSABLE MISCELLANEOUS
Qty: 300 | Refills: 0 | Status: COMPLETED | COMMUNITY
Start: 2022-05-23

## 2022-10-26 RX ORDER — TERCONAZOLE 4 MG/G
0.4 CREAM VAGINAL
Qty: 1 | Refills: 1 | Status: DISCONTINUED | COMMUNITY
Start: 2021-09-22 | End: 2022-10-26

## 2022-10-26 RX ORDER — ASPIRIN 81 MG
81 TABLET, DELAYED RELEASE (ENTERIC COATED) ORAL DAILY
Refills: 0 | Status: DISCONTINUED | COMMUNITY
End: 2022-10-26

## 2022-10-26 RX ORDER — NITROFURANTOIN MACROCRYSTALS 100 MG/1
100 CAPSULE ORAL
Qty: 28 | Refills: 1 | Status: DISCONTINUED | COMMUNITY
Start: 2021-12-27 | End: 2022-10-26

## 2022-10-26 RX ORDER — CIPROFLOXACIN HYDROCHLORIDE 500 MG/1
500 TABLET, FILM COATED ORAL
Qty: 14 | Refills: 0 | Status: DISCONTINUED | COMMUNITY
Start: 2022-10-18 | End: 2022-10-26

## 2022-10-26 RX ORDER — INSULIN ASPART 100 [IU]/ML
100 INJECTION, SOLUTION INTRAVENOUS; SUBCUTANEOUS
Qty: 45 | Refills: 0 | Status: COMPLETED | COMMUNITY
Start: 2022-08-30

## 2022-10-28 NOTE — HISTORY OF PRESENT ILLNESS
[FreeTextEntry1] : Trudy is seeing me for a f/u.\par \par She notes no CP, SOB, HAYDEN, orthopnea, LE edema, palps, syncope or near syncope \par She has stopped her lipid therapy, noting she did her own research on muscle weakness and fatigue she has felt\par \par

## 2022-10-28 NOTE — DISCUSSION/SUMMARY
[FreeTextEntry1] : No angina\par BP elevated  - recommended she self monitor at home\par Hchol- I spent extra time reviewing with her my overall concern about her future MI and stroke risk\par She agreed to try PCSK-9 I to address her dyslipidemia.\par \par  [EKG obtained to assist in diagnosis and management of assessed problem(s)] : EKG obtained to assist in diagnosis and management of assessed problem(s)

## 2022-10-31 ENCOUNTER — NON-APPOINTMENT (OUTPATIENT)
Age: 72
End: 2022-10-31

## 2022-12-06 LAB
APPEARANCE: ABNORMAL
BACTERIA UR CULT: ABNORMAL
BACTERIA: NEGATIVE
BILIRUBIN URINE: NEGATIVE
BLOOD URINE: ABNORMAL
COLOR: NORMAL
GLUCOSE QUALITATIVE U: ABNORMAL
HYALINE CASTS: 2 /LPF
KETONES URINE: NEGATIVE
LEUKOCYTE ESTERASE URINE: ABNORMAL
MICROSCOPIC-UA: NORMAL
NITRITE URINE: NEGATIVE
PH URINE: 6.5
PROTEIN URINE: NORMAL
RED BLOOD CELLS URINE: 4 /HPF
SPECIFIC GRAVITY URINE: 1.01
SQUAMOUS EPITHELIAL CELLS: 0 /HPF
URINE COMMENTS: NORMAL
UROBILINOGEN URINE: NORMAL
WHITE BLOOD CELLS URINE: 586 /HPF

## 2022-12-09 ENCOUNTER — RESULT REVIEW (OUTPATIENT)
Age: 72
End: 2022-12-09

## 2022-12-09 ENCOUNTER — APPOINTMENT (OUTPATIENT)
Dept: UROLOGY | Facility: CLINIC | Age: 72
End: 2022-12-09

## 2022-12-12 LAB
APPEARANCE: CLEAR
BACTERIA UR CULT: NORMAL
BACTERIA: NEGATIVE
BILIRUBIN URINE: NEGATIVE
BLOOD URINE: NEGATIVE
COLOR: COLORLESS
GLUCOSE QUALITATIVE U: ABNORMAL
HYALINE CASTS: 2 /LPF
KETONES URINE: NEGATIVE
LEUKOCYTE ESTERASE URINE: ABNORMAL
MICROSCOPIC-UA: NORMAL
NITRITE URINE: NEGATIVE
PH URINE: 7.5
PROTEIN URINE: NORMAL
RED BLOOD CELLS URINE: 1 /HPF
SPECIFIC GRAVITY URINE: 1.01
SQUAMOUS EPITHELIAL CELLS: 1 /HPF
UROBILINOGEN URINE: NORMAL
WHITE BLOOD CELLS URINE: 43 /HPF

## 2022-12-14 ENCOUNTER — APPOINTMENT (OUTPATIENT)
Dept: CT IMAGING | Facility: CLINIC | Age: 72
End: 2022-12-14

## 2022-12-14 ENCOUNTER — OUTPATIENT (OUTPATIENT)
Dept: OUTPATIENT SERVICES | Facility: HOSPITAL | Age: 72
LOS: 1 days | End: 2022-12-14
Payer: MEDICARE

## 2022-12-14 DIAGNOSIS — N39.0 URINARY TRACT INFECTION, SITE NOT SPECIFIED: ICD-10-CM

## 2022-12-14 PROCEDURE — 74178 CT ABD&PLV WO CNTR FLWD CNTR: CPT | Mod: 26,MH

## 2022-12-14 PROCEDURE — 74178 CT ABD&PLV WO CNTR FLWD CNTR: CPT

## 2023-01-05 ENCOUNTER — APPOINTMENT (OUTPATIENT)
Dept: UROLOGY | Facility: CLINIC | Age: 73
End: 2023-01-05
Payer: MEDICARE

## 2023-01-05 PROCEDURE — 64566 NEUROELTRD STIM POST TIBIAL: CPT

## 2023-01-12 ENCOUNTER — APPOINTMENT (OUTPATIENT)
Dept: UROLOGY | Facility: CLINIC | Age: 73
End: 2023-01-12
Payer: MEDICARE

## 2023-01-12 PROCEDURE — 64566 NEUROELTRD STIM POST TIBIAL: CPT

## 2023-01-17 ENCOUNTER — APPOINTMENT (OUTPATIENT)
Dept: UROLOGY | Facility: CLINIC | Age: 73
End: 2023-01-17

## 2023-01-19 ENCOUNTER — APPOINTMENT (OUTPATIENT)
Dept: UROLOGY | Facility: CLINIC | Age: 73
End: 2023-01-19

## 2023-01-20 ENCOUNTER — APPOINTMENT (OUTPATIENT)
Dept: UROLOGY | Facility: CLINIC | Age: 73
End: 2023-01-20
Payer: MEDICARE

## 2023-01-20 PROCEDURE — 64566 NEUROELTRD STIM POST TIBIAL: CPT

## 2023-01-24 ENCOUNTER — APPOINTMENT (OUTPATIENT)
Dept: NEPHROLOGY | Facility: CLINIC | Age: 73
End: 2023-01-24

## 2023-01-26 ENCOUNTER — APPOINTMENT (OUTPATIENT)
Dept: UROLOGY | Facility: CLINIC | Age: 73
End: 2023-01-26
Payer: MEDICARE

## 2023-01-26 PROCEDURE — 64566 NEUROELTRD STIM POST TIBIAL: CPT

## 2023-01-31 ENCOUNTER — APPOINTMENT (OUTPATIENT)
Dept: UROLOGY | Facility: CLINIC | Age: 73
End: 2023-01-31

## 2023-02-02 ENCOUNTER — APPOINTMENT (OUTPATIENT)
Dept: UROLOGY | Facility: CLINIC | Age: 73
End: 2023-02-02
Payer: MEDICARE

## 2023-02-02 ENCOUNTER — APPOINTMENT (OUTPATIENT)
Dept: UROLOGY | Facility: CLINIC | Age: 73
End: 2023-02-02

## 2023-02-02 VITALS — DIASTOLIC BLOOD PRESSURE: 79 MMHG | SYSTOLIC BLOOD PRESSURE: 182 MMHG

## 2023-02-02 PROCEDURE — 64566 NEUROELTRD STIM POST TIBIAL: CPT

## 2023-02-07 ENCOUNTER — APPOINTMENT (OUTPATIENT)
Dept: UROLOGY | Facility: CLINIC | Age: 73
End: 2023-02-07

## 2023-02-09 ENCOUNTER — APPOINTMENT (OUTPATIENT)
Dept: UROLOGY | Facility: CLINIC | Age: 73
End: 2023-02-09
Payer: MEDICARE

## 2023-02-09 ENCOUNTER — OUTPATIENT (OUTPATIENT)
Dept: OUTPATIENT SERVICES | Facility: HOSPITAL | Age: 73
LOS: 1 days | End: 2023-02-09
Payer: MEDICARE

## 2023-02-09 ENCOUNTER — APPOINTMENT (OUTPATIENT)
Dept: UROLOGY | Facility: CLINIC | Age: 73
End: 2023-02-09

## 2023-02-09 DIAGNOSIS — Z86.018 PERSONAL HISTORY OF OTHER BENIGN NEOPLASM: Chronic | ICD-10-CM

## 2023-02-09 DIAGNOSIS — Z98.62 PERIPHERAL VASCULAR ANGIOPLASTY STATUS: Chronic | ICD-10-CM

## 2023-02-09 PROCEDURE — 51700 IRRIGATION OF BLADDER: CPT

## 2023-02-10 ENCOUNTER — APPOINTMENT (OUTPATIENT)
Dept: UROLOGY | Facility: CLINIC | Age: 73
End: 2023-02-10
Payer: MEDICARE

## 2023-02-10 DIAGNOSIS — E10.3491: ICD-10-CM

## 2023-02-10 DIAGNOSIS — E27.8 OTHER SPECIFIED DISORDERS OF ADRENAL GLAND: ICD-10-CM

## 2023-02-10 PROCEDURE — 99214 OFFICE O/P EST MOD 30 MIN: CPT

## 2023-02-10 RX ORDER — VIBEGRON 75 MG/1
75 TABLET, FILM COATED ORAL
Qty: 30 | Refills: 4 | Status: ACTIVE | COMMUNITY
Start: 2023-02-10

## 2023-02-14 ENCOUNTER — APPOINTMENT (OUTPATIENT)
Dept: UROLOGY | Facility: CLINIC | Age: 73
End: 2023-02-14

## 2023-02-16 ENCOUNTER — APPOINTMENT (OUTPATIENT)
Dept: UROLOGY | Facility: CLINIC | Age: 73
End: 2023-02-16
Payer: MEDICARE

## 2023-02-16 ENCOUNTER — APPOINTMENT (OUTPATIENT)
Dept: UROLOGY | Facility: CLINIC | Age: 73
End: 2023-02-16

## 2023-02-16 PROCEDURE — 64566 NEUROELTRD STIM POST TIBIAL: CPT

## 2023-02-21 ENCOUNTER — APPOINTMENT (OUTPATIENT)
Dept: UROLOGY | Facility: CLINIC | Age: 73
End: 2023-02-21

## 2023-02-23 ENCOUNTER — APPOINTMENT (OUTPATIENT)
Dept: UROLOGY | Facility: CLINIC | Age: 73
End: 2023-02-23

## 2023-02-23 ENCOUNTER — APPOINTMENT (OUTPATIENT)
Dept: UROLOGY | Facility: CLINIC | Age: 73
End: 2023-02-23
Payer: MEDICARE

## 2023-02-23 PROCEDURE — 64566 NEUROELTRD STIM POST TIBIAL: CPT

## 2023-03-02 ENCOUNTER — APPOINTMENT (OUTPATIENT)
Dept: UROLOGY | Facility: CLINIC | Age: 73
End: 2023-03-02
Payer: MEDICARE

## 2023-03-02 PROCEDURE — 64566 NEUROELTRD STIM POST TIBIAL: CPT

## 2023-03-03 DIAGNOSIS — N39.41 URGE INCONTINENCE: ICD-10-CM

## 2023-03-03 DIAGNOSIS — N32.81 OVERACTIVE BLADDER: ICD-10-CM

## 2023-03-07 ENCOUNTER — APPOINTMENT (OUTPATIENT)
Dept: UROLOGY | Facility: CLINIC | Age: 73
End: 2023-03-07

## 2023-03-09 ENCOUNTER — APPOINTMENT (OUTPATIENT)
Dept: UROLOGY | Facility: CLINIC | Age: 73
End: 2023-03-09

## 2023-03-09 ENCOUNTER — APPOINTMENT (OUTPATIENT)
Dept: UROLOGY | Facility: CLINIC | Age: 73
End: 2023-03-09
Payer: MEDICARE

## 2023-03-09 PROCEDURE — 64566 NEUROELTRD STIM POST TIBIAL: CPT

## 2023-03-14 ENCOUNTER — APPOINTMENT (OUTPATIENT)
Dept: UROLOGY | Facility: CLINIC | Age: 73
End: 2023-03-14

## 2023-03-16 ENCOUNTER — APPOINTMENT (OUTPATIENT)
Dept: UROLOGY | Facility: CLINIC | Age: 73
End: 2023-03-16
Payer: MEDICARE

## 2023-03-16 ENCOUNTER — APPOINTMENT (OUTPATIENT)
Dept: UROLOGY | Facility: CLINIC | Age: 73
End: 2023-03-16

## 2023-03-16 PROCEDURE — 64566 NEUROELTRD STIM POST TIBIAL: CPT

## 2023-03-24 ENCOUNTER — APPOINTMENT (OUTPATIENT)
Dept: UROLOGY | Facility: CLINIC | Age: 73
End: 2023-03-24

## 2023-03-30 ENCOUNTER — NON-APPOINTMENT (OUTPATIENT)
Age: 73
End: 2023-03-30

## 2023-03-30 ENCOUNTER — APPOINTMENT (OUTPATIENT)
Dept: CARDIOLOGY | Facility: CLINIC | Age: 73
End: 2023-03-30
Payer: MEDICARE

## 2023-03-30 ENCOUNTER — APPOINTMENT (OUTPATIENT)
Dept: UROLOGY | Facility: CLINIC | Age: 73
End: 2023-03-30

## 2023-03-30 VITALS
BODY MASS INDEX: 39.4 KG/M2 | HEART RATE: 80 BPM | HEIGHT: 68 IN | DIASTOLIC BLOOD PRESSURE: 77 MMHG | WEIGHT: 260 LBS | SYSTOLIC BLOOD PRESSURE: 198 MMHG | OXYGEN SATURATION: 97 % | TEMPERATURE: 97.4 F

## 2023-03-30 VITALS — DIASTOLIC BLOOD PRESSURE: 80 MMHG | SYSTOLIC BLOOD PRESSURE: 145 MMHG

## 2023-03-30 DIAGNOSIS — E87.5 HYPERKALEMIA: ICD-10-CM

## 2023-03-30 PROCEDURE — 93000 ELECTROCARDIOGRAM COMPLETE: CPT

## 2023-03-30 PROCEDURE — 99214 OFFICE O/P EST MOD 30 MIN: CPT

## 2023-03-30 RX ORDER — METOPROLOL SUCCINATE 50 MG/1
50 TABLET, EXTENDED RELEASE ORAL DAILY
Qty: 90 | Refills: 3 | Status: DISCONTINUED | COMMUNITY
Start: 1900-01-01 | End: 2023-03-30

## 2023-03-30 RX ORDER — IRBESARTAN 300 MG/1
300 TABLET, FILM COATED ORAL DAILY
Qty: 90 | Refills: 3 | Status: DISCONTINUED | COMMUNITY
Start: 1900-01-01 | End: 2023-03-30

## 2023-04-04 PROBLEM — E87.5 HYPERKALEMIA: Status: ACTIVE | Noted: 2023-04-04

## 2023-04-04 NOTE — HISTORY OF PRESENT ILLNESS
[FreeTextEntry1] : Trudy is seeing me for a f/u. \par \par Has been dealing with high K and was told to stop avapro and take medication powder to reduce K 6.\par She admits to multiple over the counter supplements\par I reviewed her prior labs with her\par She has not CP - but does not exercise\par She does not have a primary\par \par \par

## 2023-04-04 NOTE — DISCUSSION/SUMMARY
[FreeTextEntry1] : I remain very concerned about her risk overall due to not following medical advise and taking multiple supplements (I think her supplements are causing in part, some of her hyperkalemia). While she has no angina - she has never followed my previous advise to undergo a stress test to assess for CAD progression. I asked her to stop her supplements and to stop the avapro for 1 week. I changed her metoprolol to Coreg for better BP control and after about 7-10 days I asked her to resume Avapro at a lower dose. After which, I asked her to check another K level in 2 weeks. She repeated back these instructions and understood the plan. \par  [EKG obtained to assist in diagnosis and management of assessed problem(s)] : EKG obtained to assist in diagnosis and management of assessed problem(s)

## 2023-04-04 NOTE — CARDIOLOGY SUMMARY
[de-identified] : 3/30/23\par Sinus  Bradycardia  -With rate variation \par cv = 22.\par WITHIN NORMAL LIMITS\par

## 2023-04-07 ENCOUNTER — APPOINTMENT (OUTPATIENT)
Dept: UROLOGY | Facility: CLINIC | Age: 73
End: 2023-04-07
Payer: MEDICARE

## 2023-04-07 PROCEDURE — 64566 NEUROELTRD STIM POST TIBIAL: CPT

## 2023-04-13 ENCOUNTER — APPOINTMENT (OUTPATIENT)
Dept: UROLOGY | Facility: CLINIC | Age: 73
End: 2023-04-13

## 2023-04-20 ENCOUNTER — APPOINTMENT (OUTPATIENT)
Dept: UROLOGY | Facility: CLINIC | Age: 73
End: 2023-04-20

## 2023-04-25 ENCOUNTER — NON-APPOINTMENT (OUTPATIENT)
Age: 73
End: 2023-04-25

## 2023-04-26 ENCOUNTER — NON-APPOINTMENT (OUTPATIENT)
Age: 73
End: 2023-04-26

## 2023-04-26 ENCOUNTER — APPOINTMENT (OUTPATIENT)
Dept: INTERNAL MEDICINE | Facility: CLINIC | Age: 73
End: 2023-04-26
Payer: MEDICARE

## 2023-04-26 VITALS
SYSTOLIC BLOOD PRESSURE: 160 MMHG | HEIGHT: 68 IN | OXYGEN SATURATION: 96 % | DIASTOLIC BLOOD PRESSURE: 90 MMHG | WEIGHT: 265 LBS | RESPIRATION RATE: 16 BRPM | HEART RATE: 83 BPM | BODY MASS INDEX: 40.16 KG/M2

## 2023-04-26 DIAGNOSIS — E10.9 TYPE 1 DIABETES MELLITUS W/OUT COMPLICATIONS: ICD-10-CM

## 2023-04-26 PROCEDURE — 99214 OFFICE O/P EST MOD 30 MIN: CPT

## 2023-04-26 NOTE — HEALTH RISK ASSESSMENT
[Very Good] : ~his/her~  mood as very good [No falls in past year] : Patient reported no falls in the past year [0] : 2) Feeling down, depressed, or hopeless: Not at all (0) [PHQ-2 Negative - No further assessment needed] : PHQ-2 Negative - No further assessment needed [[id number]] : 0

## 2023-04-26 NOTE — HISTORY OF PRESENT ILLNESS
[de-identified] : History of DM type 1- On tresiba 26 units and NovoLog for meals\par History of HTN- On Avapro 300mg daily- recently reduced to 150mg but did not start the Coreg 12.5.bid\par History of hyperlipidemia- Did not start the Praluent\par Overall feels OK

## 2023-04-26 NOTE — ASSESSMENT
[FreeTextEntry1] : HTN- recommend start the Coreg 12.5mg bid and continue Avapro 150mg daily\par DM- 1- last AIC- 11.6. To see endo ( Dr. Meyer) at end of month.  adviced to start the asia \par CAD- follow up with cardiology\par HCM- need mammogram and screening colon. ( understands risk of not going to follow up)\par

## 2023-04-27 ENCOUNTER — APPOINTMENT (OUTPATIENT)
Dept: UROLOGY | Facility: CLINIC | Age: 73
End: 2023-04-27

## 2023-05-04 ENCOUNTER — NON-APPOINTMENT (OUTPATIENT)
Age: 73
End: 2023-05-04

## 2023-05-04 ENCOUNTER — APPOINTMENT (OUTPATIENT)
Dept: CARDIOLOGY | Facility: CLINIC | Age: 73
End: 2023-05-04
Payer: MEDICARE

## 2023-05-04 ENCOUNTER — APPOINTMENT (OUTPATIENT)
Dept: UROLOGY | Facility: CLINIC | Age: 73
End: 2023-05-04

## 2023-05-04 VITALS
HEART RATE: 82 BPM | OXYGEN SATURATION: 97 % | HEIGHT: 68 IN | DIASTOLIC BLOOD PRESSURE: 90 MMHG | SYSTOLIC BLOOD PRESSURE: 180 MMHG | BODY MASS INDEX: 40.62 KG/M2 | WEIGHT: 268 LBS | TEMPERATURE: 98 F

## 2023-05-04 PROCEDURE — 93000 ELECTROCARDIOGRAM COMPLETE: CPT

## 2023-05-04 PROCEDURE — 99214 OFFICE O/P EST MOD 30 MIN: CPT

## 2023-05-04 RX ORDER — EVOLOCUMAB 140 MG/ML
140 INJECTION, SOLUTION SUBCUTANEOUS
Qty: 2 | Refills: 6 | Status: ACTIVE | COMMUNITY
Start: 2022-10-26 | End: 1900-01-01

## 2023-05-05 LAB
ANION GAP SERPL CALC-SCNC: 12 MMOL/L
BUN SERPL-MCNC: 29 MG/DL
CALCIUM SERPL-MCNC: 10.1 MG/DL
CHLORIDE SERPL-SCNC: 104 MMOL/L
CO2 SERPL-SCNC: 24 MMOL/L
CREAT SERPL-MCNC: 1.33 MG/DL
EGFR: 43 ML/MIN/1.73M2
GLUCOSE SERPL-MCNC: 173 MG/DL
POTASSIUM SERPL-SCNC: 5.4 MMOL/L
SODIUM SERPL-SCNC: 140 MMOL/L

## 2023-05-07 NOTE — CARDIOLOGY SUMMARY
[de-identified] : 5/4/23\par Sinus  Rhythm \par -Left axis. \par  -Poor R-wave progression -nonspecific -consider old anterior infarct. \par  -Nonspecific ST depression  -Nondiagnostic. \par \par ABNORMAL \par

## 2023-05-07 NOTE — DISCUSSION/SUMMARY
[FreeTextEntry1] : I remain very concerned about her risk overall due to not following medical advise.\par I asked her to check her K\par I asked that she switch to coreg and report back in 7-10 days a BP diary\par I recc she resume her lipid therapy\par I do not think her long term outcome will be good given her medical noncompliance\par \par  [EKG obtained to assist in diagnosis and management of assessed problem(s)] : EKG obtained to assist in diagnosis and management of assessed problem(s)

## 2023-05-07 NOTE — HISTORY OF PRESENT ILLNESS
[FreeTextEntry1] : Trudy is seeing me for a f/u. \par \par She has not repeat bloodwork to check her potassium\par She has brought in her BP monitor - but not a diary\par She has not started carvedilol\par She has not CP - but does not exercise\par She does not have a primary\par \par \par

## 2023-05-11 ENCOUNTER — APPOINTMENT (OUTPATIENT)
Dept: UROLOGY | Facility: CLINIC | Age: 73
End: 2023-05-11

## 2023-05-18 ENCOUNTER — APPOINTMENT (OUTPATIENT)
Dept: UROLOGY | Facility: CLINIC | Age: 73
End: 2023-05-18
Payer: MEDICARE

## 2023-05-18 PROCEDURE — 64566 NEUROELTRD STIM POST TIBIAL: CPT

## 2023-05-19 LAB
APPEARANCE: CLEAR
BACTERIA: NEGATIVE /HPF
BILIRUBIN URINE: NEGATIVE
BLOOD URINE: NEGATIVE
CAST: 0 /LPF
COLOR: YELLOW
EPITHELIAL CELLS: 7 /HPF
GLUCOSE QUALITATIVE U: NEGATIVE MG/DL
KETONES URINE: NEGATIVE MG/DL
LEUKOCYTE ESTERASE URINE: ABNORMAL
MICROSCOPIC-UA: NORMAL
NITRITE URINE: NEGATIVE
PH URINE: 8
PROTEIN URINE: NORMAL MG/DL
RED BLOOD CELLS URINE: 2 /HPF
SPECIFIC GRAVITY URINE: 1.01
UROBILINOGEN URINE: 0.2 MG/DL
WHITE BLOOD CELLS URINE: 2 /HPF

## 2023-05-22 LAB — BACTERIA UR CULT: NORMAL

## 2023-05-23 ENCOUNTER — NON-APPOINTMENT (OUTPATIENT)
Age: 73
End: 2023-05-23

## 2023-05-24 ENCOUNTER — NON-APPOINTMENT (OUTPATIENT)
Age: 73
End: 2023-05-24

## 2023-05-25 ENCOUNTER — NON-APPOINTMENT (OUTPATIENT)
Age: 73
End: 2023-05-25

## 2023-05-25 ENCOUNTER — APPOINTMENT (OUTPATIENT)
Dept: UROLOGY | Facility: CLINIC | Age: 73
End: 2023-05-25

## 2023-05-26 ENCOUNTER — NON-APPOINTMENT (OUTPATIENT)
Age: 73
End: 2023-05-26

## 2023-06-01 ENCOUNTER — APPOINTMENT (OUTPATIENT)
Dept: UROLOGY | Facility: CLINIC | Age: 73
End: 2023-06-01

## 2023-06-08 ENCOUNTER — APPOINTMENT (OUTPATIENT)
Dept: UROLOGY | Facility: CLINIC | Age: 73
End: 2023-06-08

## 2023-06-09 ENCOUNTER — APPOINTMENT (OUTPATIENT)
Dept: UROLOGY | Facility: CLINIC | Age: 73
End: 2023-06-09

## 2023-06-15 ENCOUNTER — APPOINTMENT (OUTPATIENT)
Dept: UROLOGY | Facility: CLINIC | Age: 73
End: 2023-06-15

## 2023-06-22 ENCOUNTER — APPOINTMENT (OUTPATIENT)
Dept: UROLOGY | Facility: CLINIC | Age: 73
End: 2023-06-22

## 2023-06-29 ENCOUNTER — APPOINTMENT (OUTPATIENT)
Dept: UROLOGY | Facility: CLINIC | Age: 73
End: 2023-06-29

## 2023-06-29 ENCOUNTER — APPOINTMENT (OUTPATIENT)
Dept: UROLOGY | Facility: CLINIC | Age: 73
End: 2023-06-29
Payer: MEDICARE

## 2023-06-29 DIAGNOSIS — N39.0 URINARY TRACT INFECTION, SITE NOT SPECIFIED: ICD-10-CM

## 2023-06-29 PROCEDURE — 99213 OFFICE O/P EST LOW 20 MIN: CPT | Mod: 25

## 2023-06-29 PROCEDURE — 64566 NEUROELTRD STIM POST TIBIAL: CPT

## 2023-06-30 LAB
APPEARANCE: CLEAR
BACTERIA: NEGATIVE /HPF
BILIRUBIN URINE: NEGATIVE
BLOOD URINE: NEGATIVE
CAST: 0 /LPF
COLOR: YELLOW
EPITHELIAL CELLS: 1 /HPF
GLUCOSE QUALITATIVE U: NEGATIVE MG/DL
KETONES URINE: NEGATIVE MG/DL
LEUKOCYTE ESTERASE URINE: NEGATIVE
MICROSCOPIC-UA: NORMAL
NITRITE URINE: NEGATIVE
PH URINE: 6.5
PROTEIN URINE: NEGATIVE MG/DL
RED BLOOD CELLS URINE: 1 /HPF
SPECIFIC GRAVITY URINE: 1.01
UROBILINOGEN URINE: 1 MG/DL
WHITE BLOOD CELLS URINE: 0 /HPF

## 2023-07-03 LAB — BACTERIA UR CULT: NORMAL

## 2023-07-06 ENCOUNTER — APPOINTMENT (OUTPATIENT)
Dept: UROLOGY | Facility: CLINIC | Age: 73
End: 2023-07-06

## 2023-07-07 ENCOUNTER — NON-APPOINTMENT (OUTPATIENT)
Age: 73
End: 2023-07-07

## 2023-07-13 ENCOUNTER — APPOINTMENT (OUTPATIENT)
Dept: UROLOGY | Facility: CLINIC | Age: 73
End: 2023-07-13

## 2023-07-20 ENCOUNTER — APPOINTMENT (OUTPATIENT)
Dept: UROLOGY | Facility: CLINIC | Age: 73
End: 2023-07-20

## 2023-07-20 ENCOUNTER — APPOINTMENT (OUTPATIENT)
Dept: UROLOGY | Facility: CLINIC | Age: 73
End: 2023-07-20
Payer: MEDICARE

## 2023-07-20 PROCEDURE — 64566 NEUROELTRD STIM POST TIBIAL: CPT

## 2023-07-24 LAB
APPEARANCE: CLEAR
BACTERIA UR CULT: NORMAL
BACTERIA: NEGATIVE /HPF
BILIRUBIN URINE: NEGATIVE
BLOOD URINE: NEGATIVE
CAST: 0 /LPF
COLOR: YELLOW
EPITHELIAL CELLS: 2 /HPF
GLUCOSE QUALITATIVE U: NEGATIVE MG/DL
KETONES URINE: NEGATIVE MG/DL
LEUKOCYTE ESTERASE URINE: ABNORMAL
MICROSCOPIC-UA: NORMAL
NITRITE URINE: NEGATIVE
PH URINE: 6
PROTEIN URINE: NEGATIVE MG/DL
RED BLOOD CELLS URINE: 0 /HPF
SPECIFIC GRAVITY URINE: 1.01
UROBILINOGEN URINE: 0.2 MG/DL
WHITE BLOOD CELLS URINE: 1 /HPF

## 2023-07-27 ENCOUNTER — APPOINTMENT (OUTPATIENT)
Dept: UROLOGY | Facility: CLINIC | Age: 73
End: 2023-07-27

## 2023-08-03 ENCOUNTER — APPOINTMENT (OUTPATIENT)
Dept: UROLOGY | Facility: CLINIC | Age: 73
End: 2023-08-03

## 2023-08-10 ENCOUNTER — APPOINTMENT (OUTPATIENT)
Dept: UROLOGY | Facility: CLINIC | Age: 73
End: 2023-08-10

## 2023-08-17 ENCOUNTER — APPOINTMENT (OUTPATIENT)
Dept: UROLOGY | Facility: CLINIC | Age: 73
End: 2023-08-17

## 2023-08-24 ENCOUNTER — APPOINTMENT (OUTPATIENT)
Dept: UROLOGY | Facility: CLINIC | Age: 73
End: 2023-08-24

## 2023-08-31 ENCOUNTER — APPOINTMENT (OUTPATIENT)
Dept: UROLOGY | Facility: CLINIC | Age: 73
End: 2023-08-31
Payer: MEDICARE

## 2023-08-31 ENCOUNTER — APPOINTMENT (OUTPATIENT)
Dept: UROLOGY | Facility: CLINIC | Age: 73
End: 2023-08-31

## 2023-08-31 DIAGNOSIS — N39.41 URGE INCONTINENCE: ICD-10-CM

## 2023-08-31 DIAGNOSIS — N32.81 OVERACTIVE BLADDER: ICD-10-CM

## 2023-08-31 PROCEDURE — 64566 NEUROELTRD STIM POST TIBIAL: CPT

## 2023-09-07 ENCOUNTER — APPOINTMENT (OUTPATIENT)
Dept: UROLOGY | Facility: CLINIC | Age: 73
End: 2023-09-07

## 2023-09-14 ENCOUNTER — APPOINTMENT (OUTPATIENT)
Dept: UROLOGY | Facility: CLINIC | Age: 73
End: 2023-09-14

## 2023-09-21 ENCOUNTER — APPOINTMENT (OUTPATIENT)
Dept: CARDIOLOGY | Facility: CLINIC | Age: 73
End: 2023-09-21
Payer: MEDICARE

## 2023-09-21 ENCOUNTER — APPOINTMENT (OUTPATIENT)
Dept: UROLOGY | Facility: CLINIC | Age: 73
End: 2023-09-21

## 2023-09-21 ENCOUNTER — NON-APPOINTMENT (OUTPATIENT)
Age: 73
End: 2023-09-21

## 2023-09-21 VITALS
BODY MASS INDEX: 40.62 KG/M2 | HEIGHT: 68 IN | TEMPERATURE: 97.1 F | WEIGHT: 268 LBS | DIASTOLIC BLOOD PRESSURE: 71 MMHG | OXYGEN SATURATION: 93 % | SYSTOLIC BLOOD PRESSURE: 191 MMHG | HEART RATE: 71 BPM

## 2023-09-21 DIAGNOSIS — I10 ESSENTIAL (PRIMARY) HYPERTENSION: ICD-10-CM

## 2023-09-21 PROCEDURE — 93000 ELECTROCARDIOGRAM COMPLETE: CPT

## 2023-09-21 PROCEDURE — 99214 OFFICE O/P EST MOD 30 MIN: CPT

## 2023-09-25 PROBLEM — I10 ESSENTIAL HYPERTENSION: Status: ACTIVE | Noted: 2018-10-08

## 2023-09-28 ENCOUNTER — APPOINTMENT (OUTPATIENT)
Dept: UROLOGY | Facility: CLINIC | Age: 73
End: 2023-09-28

## 2023-10-05 ENCOUNTER — APPOINTMENT (OUTPATIENT)
Dept: UROLOGY | Facility: CLINIC | Age: 73
End: 2023-10-05

## 2023-10-12 ENCOUNTER — APPOINTMENT (OUTPATIENT)
Dept: UROLOGY | Facility: CLINIC | Age: 73
End: 2023-10-12

## 2023-11-02 ENCOUNTER — APPOINTMENT (OUTPATIENT)
Dept: UROLOGY | Facility: CLINIC | Age: 73
End: 2023-11-02

## 2023-11-16 ENCOUNTER — APPOINTMENT (OUTPATIENT)
Dept: UROLOGY | Facility: CLINIC | Age: 73
End: 2023-11-16

## 2023-11-20 ENCOUNTER — APPOINTMENT (OUTPATIENT)
Dept: UROLOGY | Facility: CLINIC | Age: 73
End: 2023-11-20

## 2023-12-07 ENCOUNTER — APPOINTMENT (OUTPATIENT)
Dept: UROLOGY | Facility: CLINIC | Age: 73
End: 2023-12-07

## 2023-12-14 ENCOUNTER — APPOINTMENT (OUTPATIENT)
Dept: UROLOGY | Facility: CLINIC | Age: 73
End: 2023-12-14

## 2023-12-21 ENCOUNTER — APPOINTMENT (OUTPATIENT)
Dept: UROLOGY | Facility: CLINIC | Age: 73
End: 2023-12-21

## 2023-12-21 ENCOUNTER — INPATIENT (INPATIENT)
Facility: HOSPITAL | Age: 73
LOS: 3 days | Discharge: ANOTHER HOSPITAL | DRG: 563 | End: 2023-12-25
Attending: HOSPITALIST | Admitting: INTERNAL MEDICINE
Payer: MEDICARE

## 2023-12-21 VITALS
OXYGEN SATURATION: 98 % | WEIGHT: 270.07 LBS | RESPIRATION RATE: 16 BRPM | SYSTOLIC BLOOD PRESSURE: 197 MMHG | HEART RATE: 64 BPM | HEIGHT: 67 IN | DIASTOLIC BLOOD PRESSURE: 106 MMHG | TEMPERATURE: 98 F

## 2023-12-21 DIAGNOSIS — S82.009A UNSPECIFIED FRACTURE OF UNSPECIFIED PATELLA, INITIAL ENCOUNTER FOR CLOSED FRACTURE: ICD-10-CM

## 2023-12-21 DIAGNOSIS — I10 ESSENTIAL (PRIMARY) HYPERTENSION: ICD-10-CM

## 2023-12-21 DIAGNOSIS — I89.0 LYMPHEDEMA, NOT ELSEWHERE CLASSIFIED: ICD-10-CM

## 2023-12-21 DIAGNOSIS — E10.9 TYPE 1 DIABETES MELLITUS WITHOUT COMPLICATIONS: ICD-10-CM

## 2023-12-21 DIAGNOSIS — Z86.018 PERSONAL HISTORY OF OTHER BENIGN NEOPLASM: Chronic | ICD-10-CM

## 2023-12-21 DIAGNOSIS — Z98.62 PERIPHERAL VASCULAR ANGIOPLASTY STATUS: Chronic | ICD-10-CM

## 2023-12-21 DIAGNOSIS — G47.33 OBSTRUCTIVE SLEEP APNEA (ADULT) (PEDIATRIC): ICD-10-CM

## 2023-12-21 DIAGNOSIS — Z29.9 ENCOUNTER FOR PROPHYLACTIC MEASURES, UNSPECIFIED: ICD-10-CM

## 2023-12-21 LAB
ANION GAP SERPL CALC-SCNC: 5 MMOL/L — SIGNIFICANT CHANGE UP (ref 5–17)
ANION GAP SERPL CALC-SCNC: 5 MMOL/L — SIGNIFICANT CHANGE UP (ref 5–17)
BASOPHILS # BLD AUTO: 0.06 K/UL — SIGNIFICANT CHANGE UP (ref 0–0.2)
BASOPHILS # BLD AUTO: 0.06 K/UL — SIGNIFICANT CHANGE UP (ref 0–0.2)
BASOPHILS NFR BLD AUTO: 0.5 % — SIGNIFICANT CHANGE UP (ref 0–2)
BASOPHILS NFR BLD AUTO: 0.5 % — SIGNIFICANT CHANGE UP (ref 0–2)
BUN SERPL-MCNC: 18 MG/DL — SIGNIFICANT CHANGE UP (ref 7–23)
BUN SERPL-MCNC: 18 MG/DL — SIGNIFICANT CHANGE UP (ref 7–23)
CALCIUM SERPL-MCNC: 8.7 MG/DL — SIGNIFICANT CHANGE UP (ref 8.5–10.1)
CALCIUM SERPL-MCNC: 8.7 MG/DL — SIGNIFICANT CHANGE UP (ref 8.5–10.1)
CHLORIDE SERPL-SCNC: 111 MMOL/L — HIGH (ref 96–108)
CHLORIDE SERPL-SCNC: 111 MMOL/L — HIGH (ref 96–108)
CO2 SERPL-SCNC: 25 MMOL/L — SIGNIFICANT CHANGE UP (ref 22–31)
CO2 SERPL-SCNC: 25 MMOL/L — SIGNIFICANT CHANGE UP (ref 22–31)
CREAT SERPL-MCNC: 0.87 MG/DL — SIGNIFICANT CHANGE UP (ref 0.5–1.3)
CREAT SERPL-MCNC: 0.87 MG/DL — SIGNIFICANT CHANGE UP (ref 0.5–1.3)
EGFR: 70 ML/MIN/1.73M2 — SIGNIFICANT CHANGE UP
EGFR: 70 ML/MIN/1.73M2 — SIGNIFICANT CHANGE UP
EOSINOPHIL # BLD AUTO: 0.23 K/UL — SIGNIFICANT CHANGE UP (ref 0–0.5)
EOSINOPHIL # BLD AUTO: 0.23 K/UL — SIGNIFICANT CHANGE UP (ref 0–0.5)
EOSINOPHIL NFR BLD AUTO: 1.7 % — SIGNIFICANT CHANGE UP (ref 0–6)
EOSINOPHIL NFR BLD AUTO: 1.7 % — SIGNIFICANT CHANGE UP (ref 0–6)
GLUCOSE SERPL-MCNC: 288 MG/DL — HIGH (ref 70–99)
GLUCOSE SERPL-MCNC: 288 MG/DL — HIGH (ref 70–99)
HCT VFR BLD CALC: 39.9 % — SIGNIFICANT CHANGE UP (ref 34.5–45)
HCT VFR BLD CALC: 39.9 % — SIGNIFICANT CHANGE UP (ref 34.5–45)
HGB BLD-MCNC: 13 G/DL — SIGNIFICANT CHANGE UP (ref 11.5–15.5)
HGB BLD-MCNC: 13 G/DL — SIGNIFICANT CHANGE UP (ref 11.5–15.5)
IMM GRANULOCYTES NFR BLD AUTO: 0.5 % — SIGNIFICANT CHANGE UP (ref 0–0.9)
IMM GRANULOCYTES NFR BLD AUTO: 0.5 % — SIGNIFICANT CHANGE UP (ref 0–0.9)
LYMPHOCYTES # BLD AUTO: 1.37 K/UL — SIGNIFICANT CHANGE UP (ref 1–3.3)
LYMPHOCYTES # BLD AUTO: 1.37 K/UL — SIGNIFICANT CHANGE UP (ref 1–3.3)
LYMPHOCYTES # BLD AUTO: 10.4 % — LOW (ref 13–44)
LYMPHOCYTES # BLD AUTO: 10.4 % — LOW (ref 13–44)
MCHC RBC-ENTMCNC: 27.9 PG — SIGNIFICANT CHANGE UP (ref 27–34)
MCHC RBC-ENTMCNC: 27.9 PG — SIGNIFICANT CHANGE UP (ref 27–34)
MCHC RBC-ENTMCNC: 32.6 GM/DL — SIGNIFICANT CHANGE UP (ref 32–36)
MCHC RBC-ENTMCNC: 32.6 GM/DL — SIGNIFICANT CHANGE UP (ref 32–36)
MCV RBC AUTO: 85.6 FL — SIGNIFICANT CHANGE UP (ref 80–100)
MCV RBC AUTO: 85.6 FL — SIGNIFICANT CHANGE UP (ref 80–100)
MONOCYTES # BLD AUTO: 0.92 K/UL — HIGH (ref 0–0.9)
MONOCYTES # BLD AUTO: 0.92 K/UL — HIGH (ref 0–0.9)
MONOCYTES NFR BLD AUTO: 7 % — SIGNIFICANT CHANGE UP (ref 2–14)
MONOCYTES NFR BLD AUTO: 7 % — SIGNIFICANT CHANGE UP (ref 2–14)
NEUTROPHILS # BLD AUTO: 10.52 K/UL — HIGH (ref 1.8–7.4)
NEUTROPHILS # BLD AUTO: 10.52 K/UL — HIGH (ref 1.8–7.4)
NEUTROPHILS NFR BLD AUTO: 79.9 % — HIGH (ref 43–77)
NEUTROPHILS NFR BLD AUTO: 79.9 % — HIGH (ref 43–77)
NRBC # BLD: 0 /100 WBCS — SIGNIFICANT CHANGE UP (ref 0–0)
NRBC # BLD: 0 /100 WBCS — SIGNIFICANT CHANGE UP (ref 0–0)
PLATELET # BLD AUTO: 235 K/UL — SIGNIFICANT CHANGE UP (ref 150–400)
PLATELET # BLD AUTO: 235 K/UL — SIGNIFICANT CHANGE UP (ref 150–400)
POTASSIUM SERPL-MCNC: 4.1 MMOL/L — SIGNIFICANT CHANGE UP (ref 3.5–5.3)
POTASSIUM SERPL-MCNC: 4.1 MMOL/L — SIGNIFICANT CHANGE UP (ref 3.5–5.3)
POTASSIUM SERPL-SCNC: 4.1 MMOL/L — SIGNIFICANT CHANGE UP (ref 3.5–5.3)
POTASSIUM SERPL-SCNC: 4.1 MMOL/L — SIGNIFICANT CHANGE UP (ref 3.5–5.3)
RBC # BLD: 4.66 M/UL — SIGNIFICANT CHANGE UP (ref 3.8–5.2)
RBC # BLD: 4.66 M/UL — SIGNIFICANT CHANGE UP (ref 3.8–5.2)
RBC # FLD: 13.4 % — SIGNIFICANT CHANGE UP (ref 10.3–14.5)
RBC # FLD: 13.4 % — SIGNIFICANT CHANGE UP (ref 10.3–14.5)
SODIUM SERPL-SCNC: 141 MMOL/L — SIGNIFICANT CHANGE UP (ref 135–145)
SODIUM SERPL-SCNC: 141 MMOL/L — SIGNIFICANT CHANGE UP (ref 135–145)
WBC # BLD: 13.17 K/UL — HIGH (ref 3.8–10.5)
WBC # BLD: 13.17 K/UL — HIGH (ref 3.8–10.5)
WBC # FLD AUTO: 13.17 K/UL — HIGH (ref 3.8–10.5)
WBC # FLD AUTO: 13.17 K/UL — HIGH (ref 3.8–10.5)

## 2023-12-21 PROCEDURE — 99222 1ST HOSP IP/OBS MODERATE 55: CPT | Mod: GC

## 2023-12-21 PROCEDURE — 73564 X-RAY EXAM KNEE 4 OR MORE: CPT | Mod: 26,LT

## 2023-12-21 PROCEDURE — 76376 3D RENDER W/INTRP POSTPROCES: CPT | Mod: 26

## 2023-12-21 PROCEDURE — 99285 EMERGENCY DEPT VISIT HI MDM: CPT

## 2023-12-21 PROCEDURE — 73700 CT LOWER EXTREMITY W/O DYE: CPT | Mod: 26,LT,MA

## 2023-12-21 RX ORDER — INSULIN ASPART 100 [IU]/ML
0 INJECTION, SOLUTION SUBCUTANEOUS
Qty: 0 | Refills: 0 | DISCHARGE

## 2023-12-21 RX ORDER — DEXTROSE 50 % IN WATER 50 %
12.5 SYRINGE (ML) INTRAVENOUS ONCE
Refills: 0 | Status: DISCONTINUED | OUTPATIENT
Start: 2023-12-21 | End: 2023-12-25

## 2023-12-21 RX ORDER — DEXTROSE 50 % IN WATER 50 %
15 SYRINGE (ML) INTRAVENOUS ONCE
Refills: 0 | Status: DISCONTINUED | OUTPATIENT
Start: 2023-12-21 | End: 2023-12-25

## 2023-12-21 RX ORDER — INSULIN DEGLUDEC 100 U/ML
46 INJECTION, SOLUTION SUBCUTANEOUS
Qty: 0 | Refills: 0 | DISCHARGE

## 2023-12-21 RX ORDER — INSULIN LISPRO 100/ML
VIAL (ML) SUBCUTANEOUS AT BEDTIME
Refills: 0 | Status: DISCONTINUED | OUTPATIENT
Start: 2023-12-21 | End: 2023-12-22

## 2023-12-21 RX ORDER — CARVEDILOL PHOSPHATE 80 MG/1
12.5 CAPSULE, EXTENDED RELEASE ORAL EVERY 12 HOURS
Refills: 0 | Status: DISCONTINUED | OUTPATIENT
Start: 2023-12-21 | End: 2023-12-25

## 2023-12-21 RX ORDER — METOPROLOL TARTRATE 50 MG
1 TABLET ORAL
Qty: 0 | Refills: 0 | DISCHARGE

## 2023-12-21 RX ORDER — SODIUM CHLORIDE 9 MG/ML
1000 INJECTION, SOLUTION INTRAVENOUS
Refills: 0 | Status: DISCONTINUED | OUTPATIENT
Start: 2023-12-21 | End: 2023-12-25

## 2023-12-21 RX ORDER — GLUCAGON INJECTION, SOLUTION 0.5 MG/.1ML
1 INJECTION, SOLUTION SUBCUTANEOUS ONCE
Refills: 0 | Status: DISCONTINUED | OUTPATIENT
Start: 2023-12-21 | End: 2023-12-25

## 2023-12-21 RX ORDER — ENOXAPARIN SODIUM 100 MG/ML
40 INJECTION SUBCUTANEOUS EVERY 12 HOURS
Refills: 0 | Status: DISCONTINUED | OUTPATIENT
Start: 2023-12-21 | End: 2023-12-25

## 2023-12-21 RX ORDER — INSULIN LISPRO 100/ML
VIAL (ML) SUBCUTANEOUS
Refills: 0 | Status: DISCONTINUED | OUTPATIENT
Start: 2023-12-21 | End: 2023-12-22

## 2023-12-21 RX ORDER — LOSARTAN POTASSIUM 100 MG/1
50 TABLET, FILM COATED ORAL DAILY
Refills: 0 | Status: DISCONTINUED | OUTPATIENT
Start: 2023-12-21 | End: 2023-12-22

## 2023-12-21 RX ORDER — INSULIN GLARGINE 100 [IU]/ML
36 INJECTION, SOLUTION SUBCUTANEOUS AT BEDTIME
Refills: 0 | Status: DISCONTINUED | OUTPATIENT
Start: 2023-12-21 | End: 2023-12-22

## 2023-12-21 RX ORDER — ACETAMINOPHEN 500 MG
650 TABLET ORAL ONCE
Refills: 0 | Status: COMPLETED | OUTPATIENT
Start: 2023-12-21 | End: 2023-12-21

## 2023-12-21 RX ORDER — ONDANSETRON 8 MG/1
4 TABLET, FILM COATED ORAL EVERY 8 HOURS
Refills: 0 | Status: DISCONTINUED | OUTPATIENT
Start: 2023-12-21 | End: 2023-12-25

## 2023-12-21 RX ORDER — CARVEDILOL PHOSPHATE 80 MG/1
1 CAPSULE, EXTENDED RELEASE ORAL
Refills: 0 | DISCHARGE

## 2023-12-21 RX ORDER — IRBESARTAN 75 MG/1
1 TABLET ORAL
Qty: 0 | Refills: 0 | DISCHARGE

## 2023-12-21 RX ORDER — ACETAMINOPHEN 500 MG
650 TABLET ORAL EVERY 6 HOURS
Refills: 0 | Status: DISCONTINUED | OUTPATIENT
Start: 2023-12-21 | End: 2023-12-25

## 2023-12-21 RX ORDER — INSULIN LISPRO 100/ML
5 VIAL (ML) SUBCUTANEOUS
Refills: 0 | Status: DISCONTINUED | OUTPATIENT
Start: 2023-12-21 | End: 2023-12-22

## 2023-12-21 RX ORDER — IBUPROFEN 200 MG
400 TABLET ORAL ONCE
Refills: 0 | Status: COMPLETED | OUTPATIENT
Start: 2023-12-21 | End: 2023-12-21

## 2023-12-21 RX ORDER — DEXTROSE 50 % IN WATER 50 %
25 SYRINGE (ML) INTRAVENOUS ONCE
Refills: 0 | Status: DISCONTINUED | OUTPATIENT
Start: 2023-12-21 | End: 2023-12-25

## 2023-12-21 RX ORDER — LANOLIN ALCOHOL/MO/W.PET/CERES
3 CREAM (GRAM) TOPICAL AT BEDTIME
Refills: 0 | Status: DISCONTINUED | OUTPATIENT
Start: 2023-12-21 | End: 2023-12-25

## 2023-12-21 RX ORDER — INFLUENZA VIRUS VACCINE 15; 15; 15; 15 UG/.5ML; UG/.5ML; UG/.5ML; UG/.5ML
0.7 SUSPENSION INTRAMUSCULAR ONCE
Refills: 0 | Status: DISCONTINUED | OUTPATIENT
Start: 2023-12-21 | End: 2023-12-25

## 2023-12-21 RX ADMIN — Medication 650 MILLIGRAM(S): at 11:10

## 2023-12-21 RX ADMIN — Medication 650 MILLIGRAM(S): at 15:35

## 2023-12-21 RX ADMIN — Medication 650 MILLIGRAM(S): at 08:42

## 2023-12-21 RX ADMIN — Medication 400 MILLIGRAM(S): at 11:31

## 2023-12-21 RX ADMIN — Medication 400 MILLIGRAM(S): at 08:42

## 2023-12-21 RX ADMIN — Medication 650 MILLIGRAM(S): at 16:42

## 2023-12-21 NOTE — H&P ADULT - NSHPPHYSICALEXAM_GEN_ALL_CORE
T(C): 36.7 (12-21-23 @ 17:59), Max: 36.9 (12-21-23 @ 13:28)  HR: 82 (12-21-23 @ 17:59) (59 - 82)  BP: 185/84 (12-21-23 @ 17:59) (161/76 - 204/73)  RR: 17 (12-21-23 @ 17:59) (16 - 18)  SpO2: 98% (12-21-23 @ 17:59) (97% - 98%)    GENERAL: patient appears well, no acute distress, appropriate, pleasant  EYES: sclera clear, no exudates  ENMT: oropharynx clear without erythema, no exudates, moist mucous membranes  NECK: supple, soft, no thyromegaly noted  LUNGS: good air entry bilaterally, clear to auscultation, symmetric breath sounds, no wheezing or rhonchi appreciated  HEART: soft S1/S2, regular rate and rhythm,systolic murmur noted, bilateral pitting edema  GASTROINTESTINAL: abdomen is soft, nontender, nondistended, normoactive bowel sounds, no palpable masses  INTEGUMENT: good skin turgor, no lesions noted, erythematous left lower extremity  MUSCULOSKELETAL: no clubbing or cyanosis, no obvious deformity  NEUROLOGIC: awake, alert, oriented x3, good muscle tone in 4 extremities, no obvious sensory deficits  PSYCHIATRIC: mood is good, affect is congruent, linear and logical thought process  HEME/LYMPH: no palpable supraclavicular nodules, no obvious ecchymosis or petechiae

## 2023-12-21 NOTE — ED ADULT NURSE REASSESSMENT NOTE - NS ED NURSE REASSESS COMMENT FT1
pt resting comfortably in bed, denies complaints. repositioned for comfort. DC reversed and pt made ready for admission. 20g iv placed in L forearm, labs sent. md at bedside for reeval. will continue to monitor.

## 2023-12-21 NOTE — PHYSICAL THERAPY INITIAL EVALUATION ADULT - LEVEL OF INDEPENDENCE: SIT/SUPINE, REHAB EVAL
n/a- pt received in transport chair in [facility] in ER n/a- pt received in transport chair in [facility] in ER

## 2023-12-21 NOTE — ED PROVIDER NOTE - OBJECTIVE STATEMENT
73F with medical history of htn presenting with left knee pain after trip and fall. Patient was walking her dog and tripped and fell on uneven ground. NO head injury. No LOC. No AC.

## 2023-12-21 NOTE — H&P ADULT - PROBLEM SELECTOR PLAN 2
- reports history of edema in lower extremities  - not on any home medications for condition  - pitting edema present on physical examination  - TTE ordered, f/u results, systolic murmur present on exam  - follows outpatient cardiology, no past TTE present  - US duplex bilateral lower extremities ordered, f/u results

## 2023-12-21 NOTE — H&P ADULT - PROBLEM SELECTOR PLAN 4
Chronic, stable  - f/u A1c  - on home insulin  - start lantus 36 u nighttime  - start 5 u admelog pre-meal  - c/w low dose ISS

## 2023-12-21 NOTE — ED ADULT NURSE NOTE - OBJECTIVE STATEMENT
74 y/o female A&Ox4, ambulatory at baseline, received in room 18B. Pt arrived s/p trip and fall denies head trauma, fell on knees BL knee abrasions noted. no active bleeding. Pt hypertensive at this time, history of hypertension. Didn't take her medication this morning. Denies headache, dizziness, chest pain. Respirations even and unlabored. Medicated as per MD orders. Awaiting Xray results. Bed locked in lowest position. Bedside table phone and call bell within reach, will call for help if needed. 72 y/o female A&Ox4, ambulatory at baseline, received in room 18B. Pt arrived s/p trip and fall denies head trauma, fell on knees BL knee abrasions noted. no active bleeding. Pt hypertensive at this time, history of hypertension. Didn't take her medication this morning. Denies headache, dizziness, chest pain. Respirations even and unlabored. Medicated as per MD orders. Awaiting Xray results. Bed locked in lowest position. Bedside table phone and call bell within reach, will call for help if needed.

## 2023-12-21 NOTE — H&P ADULT - PROBLEM SELECTOR PLAN 1
- s/p mechanical fall  - XR shows 5 cm distracted mid body patella transverse fracture.  Fracture fragments inferior to the distal patella fracture segment.  - CT scans shows Acute comminuted distracted transverse fracture of the patella at its midportion as above.  - elevated leukocyte count, possibly reactive to trauma  - Pain regimen in place  - knee immobilizer in place  - WBAT  - Ice and elevate  - Ortho consulted, recs appreciated  - to follow up outpatient ortho one week after discharge  - PT consulted - s/p mechanical fall  - XR shows 5 cm distracted mid body patella transverse fracture.  Fracture fragments inferior to the distal patella fracture segment.  - CT scans shows Acute comminuted distracted transverse fracture of the patella at its midportion as above.  - elevated leukocyte count, possibly reactive to trauma  - Pain regimen in place  - Fall risk protocol  - knee immobilizer in place  - WBAT  - Ice and elevate  - Ortho consulted, recs appreciated  - to follow up outpatient ortho one week after discharge  - PT consulted

## 2023-12-21 NOTE — CHART NOTE - NSCHARTNOTEFT_GEN_A_CORE
Called by RN for Pt refusing PM lantus because, per pt, already self injected w/ personal Tresiba pen. Discussed w/ pt at bedside stating to not use her personal insulin while in the hospital. Discussed dangers of hypoglycemia if she self-administers insulin in addition to diabetes regimen ordered in hospital. Will continue to monitor BG.

## 2023-12-21 NOTE — PHYSICAL THERAPY INITIAL EVALUATION ADULT - ADDITIONAL COMMENTS
Pt lives alone in a house, no steps. Pt is an ind ambulator without device and ind w/ ADLs. + driving

## 2023-12-21 NOTE — CARE COORDINATION ASSESSMENT. - OTHER PERTINENT REFERRAL INFORMATION
SW met with pt at bedside in ED. Pt is S/p fall while walking her dog.   Initially pt was planning to go home but is now recommended for ADELITA . Pt is agreeable.

## 2023-12-21 NOTE — H&P ADULT - NSHPSOCIALHISTORY_GEN_ALL_CORE
Lives at home, retired  Denies alcohol use  Denies tobacco use  Denies illicit drug use  Ambulates without assistance

## 2023-12-21 NOTE — PATIENT PROFILE ADULT - FALL HARM RISK - HARM RISK INTERVENTIONS
Assistance with ambulation/Assistance OOB with selected safe patient handling equipment/Communicate Risk of Fall with Harm to all staff/Discuss with provider need for PT consult/Monitor gait and stability/Provide patient with walking aids - walker, cane, crutches/Reinforce activity limits and safety measures with patient and family/Reorient to person, place and time as needed/Review medications for side effects contributing to fall risk/Tailored Fall Risk Interventions/Visual Cue: Yellow wristband and red socks/Bed in lowest position, wheels locked, appropriate side rails in place/Call bell, personal items and telephone in reach/Instruct patient to call for assistance before getting out of bed or chair/Non-slip footwear when patient is out of bed/[place] to call system/Physically safe environment - no spills, clutter or unnecessary equipment/Purposeful Proactive Rounding/Room/bathroom lighting operational, light cord in reach Assistance with ambulation/Assistance OOB with selected safe patient handling equipment/Communicate Risk of Fall with Harm to all staff/Discuss with provider need for PT consult/Monitor gait and stability/Provide patient with walking aids - walker, cane, crutches/Reinforce activity limits and safety measures with patient and family/Reorient to person, place and time as needed/Review medications for side effects contributing to fall risk/Tailored Fall Risk Interventions/Visual Cue: Yellow wristband and red socks/Bed in lowest position, wheels locked, appropriate side rails in place/Call bell, personal items and telephone in reach/Instruct patient to call for assistance before getting out of bed or chair/Non-slip footwear when patient is out of bed/[place] to call system/Physically safe environment - no spills, clutter or unnecessary equipment/Purposeful Proactive Rounding/Room/bathroom lighting operational, light cord in reach

## 2023-12-21 NOTE — ED PROVIDER NOTE - PROGRESS NOTE DETAILS
The patient has a mobility limitation that significantly impairs their ability to participate in one or more mobility related activities of daily living within the home. By the utilizing a rolling water/rollator the functional mobility deficit can be sufficiently resolved. The patient demonstrates safe use of rolling walker/rollator. Patient was discharged prior to my arrival but was then seen by PT and was unable to ambulate.  Was recommended that she be admitted by social work as she will need a 3 night stay.

## 2023-12-21 NOTE — H&P ADULT - ASSESSMENT
Patient is a 73 YOA female patient with PMHx for T1DM, HTN, SHANTE on CPAP, presenting today to the ED for a fall, admitted for placement.

## 2023-12-21 NOTE — H&P ADULT - ATTENDING COMMENTS
Patient is being admitted due to weakness s/p fall requiring placement. We have consulted PT and will attempt for placement for patient. Due to concerns for heart failure, we have ordered a 2

## 2023-12-21 NOTE — PATIENT PROFILE ADULT - BILL PAYMENT
10/3/2018      Cady Marie PA-C  [address] [facility] [address]  [place] [address]      RE: Elaina Winn       Dear Colleague,    I had the pleasure of seeing Elaina Winn in the [facility].    Service Date: 10/03/2018      HISTORY OF PRESENT ILLNESS:  I saw Ms. Winn for followup of atrial fibrillation.  She is a 75-year-old white female who was found to have paroxysmal atrial fibrillation in 2017 when she was admitted for liver dysfunction.  When I saw her in 12/2017, I stopped her anticoagulation because of liver dysfunction.  She subsequently had recurrent atrial fibrillation that required ER visit and DC cardioversion.  Over the last few months, she has been on Eliquis and metoprolol.  Symptomatically, she is not aware of palpitations.  She complains of fatigue but no dizziness or near syncope.  She does not feel atrial fibrillation symptomatically.  She brought in a list of her daily blood pressures and heart rates which are all in the normal range.      The patient was diagnosed to have autoimmune hepatitis.  Her liver enzyme was back to normal in 02/2018.  Her platelets are still low in the 90s.  She has been on medication for dementia.  She complains about easy bruising.      PHYSICAL EXAMINATION:   VITAL SIGNS:  Blood pressure was 115/77, heart rate 65 beats per minute, body weight 151 pounds.   HEENT:  Eyes and ENT were unremarkable.   LUNGS:  Clear.   CARDIAC:  Rhythm was regular and heart sounds were normal without murmur.   ABDOMEN:  Examination showed no hepatomegaly.   EXTREMITIES:  There was no pedal edema.      RADIOLOGIC STUDIES:  EKG today showed normal sinus rhythm.      ASSESSMENT AND RECOMMENDATIONS:  Ms. Winn is doing well symptomatically without apparent symptomatic atrial fibrillation.  She has some mild fatigue.  Because of the multiple risk factors of bleeding including dementia, thrombocytopenia, liver dysfunction and renal dysfunction, I have stopped her  Eliquis.  She will continue low dose of metoprolol.  She is scheduled for return Cardiology visit in 6 months.      cc:   JOSE Salomon    [address], [address]   [place] [address]         NORM ERWIN MD             D: 10/03/2018   T: 10/03/2018   MT: YURI      Name:     ABDULLAHI BLACKWOOD   MRN:      [id number]        Account:      [id number]   :      1943           Service Date: 10/03/2018      Document: [id number]           Outpatient Encounter Prescriptions as of 10/3/2018   Medication Sig Dispense Refill     Apoaequorin (PREVAGEN PO) Take 1 tablet by mouth daily       brimonidine-timolol (COMBIGAN) 0.2-0.5 % ophthalmic solution Place 1 drop into both eyes 2 times daily        metoprolol tartrate (LOPRESSOR) 25 MG tablet Take 1 tablet (25 mg) by mouth 2 times daily 180 tablet 3     Multiple Vitamin (MULTI-VITAMIN) per tablet Take 1 tablet by mouth daily.       [DISCONTINUED] apixaban ANTICOAGULANT (ELIQUIS) 5 MG tablet Take 1 tablet (5 mg) by mouth 2 times daily 180 tablet 3     No facility-administered encounter medications on file as of 10/3/2018.        Again, thank you for allowing me to participate in the care of your patient.      Sincerely,    Norm Erwin MD     [facility]     no

## 2023-12-21 NOTE — ED PROVIDER NOTE - CLINICAL SUMMARY MEDICAL DECISION MAKING FREE TEXT BOX
Concern for patellar fracture vs tibial plateau fracture vs dislocation vs traumatic bursitis. Will order xrays.    Xrays show patellar fracture with a distal fragment and unable to do straight leg test. Ortho will follow up outpatient.

## 2023-12-21 NOTE — ED PROVIDER NOTE - PATIENT PORTAL LINK FT
You can access the FollowMyHealth Patient Portal offered by [facility] by registering at the following website: [email or web address]. By joining [employer]’s FollowMyHealth portal, you will also be able to view your health information using other applications (apps) compatible with our system. You can access the FollowMyHealth Patient Portal offered by [facility] by registering at the following website: [email or web address]. By joining [employer]’s FollowMyHealth portal, you will also be able to view your health information using other applications (apps) compatible with our system.

## 2023-12-21 NOTE — CARE COORDINATION ASSESSMENT. - NSPASTMEDSURGHISTORY_GEN_ALL_CORE_FT
PAST MEDICAL & SURGICAL HISTORY:  Diabetes, type I      Obesity      SHANTE on CPAP      H/O lipoma  removal      History of angioplasty  PCI of RCA

## 2023-12-21 NOTE — PATIENT CHOICE NOTE. - NSPTCHOICESTATE_GEN_ALL_CORE
I have met with the patient and/or caregiver to discuss discharge goals and treatment plan. Patient and/or caregiver also provided with instructions on accessing the CMS Compare websites for additional information related to Post Acute Provider quality and resource use measures to assist them in evaluation of the providers and in selecting their post-acute provider of choice. Patient and caregiver were informed of the facilities that are owned and/or operated by [facility]. I have discussed with the patient the availability of in-network facilities and providers. Patient and caregiver provided with a list of post-acute providers whose services are appropriate to the discharge plans and patient needs.     For patient requiring durable medical equipment, patient and/or caregiver were informed that they have the right to request who provides the required equipment. I have met with the patient and/or caregiver to discuss discharge goals and treatment plan. Patient and/or caregiver also provided with instructions on accessing the CMS Compare websites for additional information related to Post Acute Provider quality and resource use measures to assist them in evaluation of the providers and in selecting their post-acute provider of choice. Patient and caregiver were informed of the facilities that are owned and/or operated by [facility]. I have discussed with the patient the availability of in-network facilities and providers. Patient and caregiver provided with a list of post-acute providers whose services are appropriate to the discharge plans and patient needs.     For patient requiring durable medical equipment, patient and/or caregiver were informed that they have the right to request who provides the required equipment.

## 2023-12-21 NOTE — CARE COORDINATION ASSESSMENT. - NSDCPLANSERVICES_GEN_ALL_CORE
Discharge education provided. Pt is now recommended for [facility] . Pt is in agreement with plan and has provided choices. Pt requires a 3 night stay under medicare guidelines.  SW to follow for safe discharge plan and support./Skilled Nursing Facility-Short Term

## 2023-12-21 NOTE — PHYSICAL THERAPY INITIAL EVALUATION ADULT - PERTINENT HX OF CURRENT PROBLEM, REHAB EVAL
73F with medical history of htn presenting with left knee pain after trip and fall. Patient was walking her dog and tripped and fell on uneven ground. + L patella fx, WBAT in KI

## 2023-12-21 NOTE — H&P ADULT - NSHPREVIEWOFSYSTEMS_GEN_ALL_CORE
CONSTITUTIONAL: denies fever, chills, fatigue, weakness  HEENT: denies blurred vision, sore throat  SKIN: denies new lesions, rash  CARDIOVASCULAR: denies chest pain, chest pressure, palpitations  RESPIRATORY: denies shortness of breath, sputum production  GASTROINTESTINAL: denies nausea, vomiting, diarrhea, abdominal pain  GENITOURINARY: denies dysuria, discharge  NEUROLOGICAL: denies numbness, headache, focal weakness  MUSCULOSKELETAL: endorses new joint pain  HEMATOLOGIC: denies gross bleeding, bruising  LYMPHATICS: denies enlarged lymph nodes, extremity swelling  PSYCHIATRIC: denies recent changes in anxiety, depression  ENDOCRINOLOGIC: denies sweating, cold or heat intolerance

## 2023-12-21 NOTE — ED PROVIDER NOTE - NSFOLLOWUPINSTRUCTIONS_ED_ALL_ED_FT
Procedure:  Bulky León Knee Immobilizer applied     -Pain control PRN  -WBAT in Knee Immobilizer    -Keep splint/dressing clean and dry.   -ICE and elevate  -Do not remove dressing/splint until follow up in the office.   -F/U CT Left Knee   -Follow up with Dr. Villagomez within 1 week. Please call the office to make an appointment.   -Discussed with Dr. Villagomez who is aware and approves of plan.

## 2023-12-21 NOTE — PATIENT PROFILE ADULT - FUNCTIONAL ASSESSMENT - BASIC MOBILITY 6.
1-calculated by average/Not able to assess (calculate score using [facility] averaging method)  1-calculated by average/Not able to assess (calculate score using [facility] averaging method)

## 2023-12-21 NOTE — CARE COORDINATION ASSESSMENT. - NSCAREPROVIDERS_GEN_ALL_CORE_FT
CARE PROVIDERS:  Administration: iVtor Grace  Administration: Tree Iqbal  Admitting: Estefania Myles  Attending: Estefania Myles  Case Management: Kaila Alberto  Consultant: Ti Rodriguez  ED Attending: Estefania Myles  ED Nurse: Bhavya Bernabe  ED Nurse 2: Peyton Meza  Nurse: Peyton Meza  Ordered: ADM, User  PCA/Nursing Assistant: Rena Treadwell  Physical Therapy: Crow Ma  Physical Therapy: Anel Waldrop  Primary Team: Johanna Jonas  Primary Team: Anne-Marie Montez  : Brandi Newby// Supp. Assoc.: Hailey Zaldivar   CARE PROVIDERS:  Administration: Vitor Grace  Administration: Tree Iqbal  Admitting: Estefania Myles  Attending: Estefania Myles  Case Management: Kaila Alberto  Consultant: Ti Rodriguez  ED Attending: Estefania Myles  ED Nurse: Bhavya Bernabe  ED Nurse 2: Peyton Meza  Nurse: Peyton Meza  Ordered: ADM, User  PCA/Nursing Assistant: Rena Treadwell  Physical Therapy: Crow Ma  Physical Therapy: Anel Waldrop  Primary Team: Johanna Jonas  Primary Team: Anne-Marie Montez  : Brandi Newby// Supp. Assoc.: Hailey Zaldivar

## 2023-12-21 NOTE — PHYSICAL THERAPY INITIAL EVALUATION ADULT - LEVEL OF INDEPENDENCE: SUPINE/SIT, REHAB EVAL
n/a- pt received in transport chair in [facility] in ER n/a- pt received in transport chair in [facility] in ER

## 2023-12-21 NOTE — H&P ADULT - HISTORY OF PRESENT ILLNESS
Patient is a 73 YOA female patient with PMHx for T1DM, HTN, SHANTE on CPAP, presenting today to the ED for a fall.  Earlier today, the patient was walking her dog for jesus errands when she tripped on the curb.  She did not lose consciousness or hit her head during this incident.  She fell on her left knee.  Patient was not in considerable pain.  She called EMS, who tried to help her ambulate but her knees buckled during that attempt.  Patient denies chest pain, SOB, palpitations, fever, sick contacts, recent travel.    In the ED pts VS were T(C): 36.7  T(F): 98  HR: 82  BP: 185/84  RR: 17  SpO2: 98%  Labs show: Hg:   WBC:  Plt:  Creatinine:  CXR shows  CT scans shows      Pt is admitted for further workup and management   Patient is a 73 YOA female patient with PMHx for T1DM, HTN, SHANTE on CPAP, presenting today to the ED for a fall.  Earlier today, the patient was walking her dog for jesus errands when she tripped on the curb.  She did not lose consciousness or hit her head during this incident.  She fell on her left knee.  Patient was not in considerable pain.  She called EMS, who tried to help her ambulate but her knees buckled during that attempt.  Patient denies chest pain, SOB, palpitations, fever, sick contacts, recent travel.    In the ED pts VS were T(C): 36.7  T(F): 98  HR: 82  BP: 185/84  RR: 17  SpO2: 98%  Labs show: H.0  WBC:13.17  Plt: 235  Creatinine: 0.87  XR shows 5 cm distracted mid body patella transverse fracture.  Fracture fragments inferior to the distal patella fracture segment.  CT scans shows Acute comminuted distracted transverse fracture of the patella at its   midportion as above.    f/u EKG    Pt is admitted for further workup and management

## 2023-12-21 NOTE — ED PROVIDER NOTE - CARE PROVIDER_API CALL
Jose Guadalupe Villagomez  Orthopaedic Surgery  [address] [facility] [facility] [facility] [facility] [address]  [place] [address]  Phone: [phone]  Fax: [phone]  Follow Up Time:    Jose Guadalupe Villagomez  Orthopaedic Surgery  [address] [facility] [facility] [address]  [place] [address]  Phone: [phone]  Fax: [phone]  Follow Up Time:

## 2023-12-21 NOTE — CASE MANAGEMENT PROGRESS NOTE - NSCMPROGRESSNOTE_GEN_ALL_CORE
Called by ER to see patient for RW. RX for RW along with note for justification [facility] to [facility]. requested RW to be given to patient in ER for discharge

## 2023-12-21 NOTE — SOCIAL WORK PROGRESS NOTE - NSSWPROGRESSNOTE_GEN_ALL_CORE
SW met with pt at bedside in the ED. Pt is requesting [facility] PT and a rolling walker. She is concerned about being home with out help. PT eval pending . Pt was dispensed a rolling walker from [facility].  SW met with pt at bedside in the ED. Pt is requesting [facility] PT and a rolling walker. She is concerned about being home with out help. PT eval pending . Pt was dispensed a rolling walker from [facility].

## 2023-12-21 NOTE — ED PROVIDER NOTE - MUSCULOSKELETAL, MLM
Spine appears normal, range of motion is not limited. Pain to left knee with swelling over patella with overlying briusing. no opening in skin. unable to do straight leg test. flexion normal  but painful.

## 2023-12-21 NOTE — CONSULT NOTE ADULT - SUBJECTIVE AND OBJECTIVE BOX
Pt Name: EULALIA MICHAEL    MRN: [id number]    HPI: Patient is a 73y Female who presents to the ER s/o mechanical fall while walking her dog earlier this AM. Patient states she was able to get back up but when she tired to walk she "felt her left knee give out." Ortho consulted for transverse patella fracture noted on Xray. Patient denies any numbness, tingling, or any other acute orthopedics complaints. At baseline, patient is an independent ambulator.      PAST MEDICAL & SURGICAL HISTORY:  SHANTE on CPAP  Obesity  Diabetes, type I  H/O lipoma  removal  History of angioplasty  PCI of RCA      Allergies: No Known Allergies          PHYSICAL EXAM:    Vital Signs Last 24 Hrs  T(C): 36.3 (21 Dec 2023 08:27), Max: 36.8 (21 Dec 2023 07:40)  T(F): 97.3 (21 Dec 2023 08:27), Max: 98.2 (21 Dec 2023 07:40)  HR: 59 (21 Dec 2023 08:27) (59 - 64)  BP: 197/96 (21 Dec 2023 09:02) (197/96 - 204/73)  BP(mean): --  RR: 16 (21 Dec 2023 08:27) (16 - 16)  SpO2: 98% (21 Dec 2023 08:27) (98% - 98%)    Parameters below as of 21 Dec 2023 07:40  Patient On (Oxygen Delivery Method): room air        Physical Exam:  General: NAD, Alert, Awake and oriented  Respiratory: Symmetric chest wall expansion bilaterally, no accessory muscle use  LEFT LE: No open skin. Obvious deformity noted about the knee. +Edema and ecchymosis noted at the knee. Able to flex and extend knee with minimal pain, EHL/TA/GS intact, Unable to actively SLR. SILT. 2+ DP/PT pulses with brisk cap refill distally. Compartments soft and compressible.     Secondary Survey:   RLE/RUE/LUE: No TTP over bony prominences, SILT, palpable pulses, full/painless range of motion, compartments soft      Imaging: Xray Left Knee: Transverse Fracture of the Patella with distal fragments     Orthopedic A/P:    Pt is a  73y Female with Left Patella Fracture     Procedure:  Bulky León Knee Immobilizer applied     -Pain control PRN  -WBAT in Knee Immobilizer    -Keep splint/dressing clean and dry.   -ICE and elevate  -Do not remove dressing/splint until follow up in the office.   -F/U CT Left Knee   -Follow up with Dr. Villagomez within 1 week. Please call the office to make an appointment.   -Discussed with Dr. Villagomez who is aware and approves of plan.      Pt Name: EULALIA MICHAEL    MRN: [id number]    HPI: Patient is a 73y Female who presents to the ER s/o mechanical fall while walking her dog earlier this AM. Patient states she was able to get back up but when she tired to walk she "felt her left knee give out." Ortho consulted for transverse patella fracture noted on Xray. Patient denies any numbness, tingling, or any other acute orthopedics complaints. At baseline, patient is an independent ambulator.      PAST MEDICAL & SURGICAL HISTORY:  SHANTE on CPAP  Obesity  Diabetes, type I  H/O lipoma  removal  History of angioplasty  PCI of RCA      Allergies: No Known Allergies          PHYSICAL EXAM:    Vital Signs Last 24 Hrs  T(C): 36.3 (21 Dec 2023 08:27), Max: 36.8 (21 Dec 2023 07:40)  T(F): 97.3 (21 Dec 2023 08:27), Max: 98.2 (21 Dec 2023 07:40)  HR: 59 (21 Dec 2023 08:27) (59 - 64)  BP: 197/96 (21 Dec 2023 09:02) (197/96 - 204/73)  BP(mean): --  RR: 16 (21 Dec 2023 08:27) (16 - 16)  SpO2: 98% (21 Dec 2023 08:27) (98% - 98%)    Parameters below as of 21 Dec 2023 07:40  Patient On (Oxygen Delivery Method): room air        Physical Exam:  General: NAD, Alert, Awake and oriented  Respiratory: Symmetric chest wall expansion bilaterally, no accessory muscle use  LEFT LE: No open skin. Obvious deformity noted about the knee. +Edema and ecchymosis noted at the knee. Able to flex and extend knee with minimal pain, EHL/TA/GS intact, Unable to actively SLR. SILT. 2+ DP/PT pulses with brisk cap refill distally. Compartments soft and compressible.     Secondary Survey:   RLE/RUE/LUE: No TTP over bony prominences, SILT, palpable pulses, full/painless range of motion, compartments soft      Imaging: Xray Left Knee: Transverse Fracture of the Patella with distal fragments     Orthopedic A/P:    Pt is a  73y Female with Left Patella Fracture     Procedure:  Bulky León Knee Immobilizer applied     -Pain control PRN  -WBAT in Knee Immobilizer    -Keep splint/dressing clean and dry.   -ICE and elevate  -Do not remove dressing/splint until follow up in the office.   -F/U CT Left Knee   -Follow up with Dr. Villagomez within 1 week. Please call the office to make an appointment.   -Discussed with Dr. Villagomez who is aware and approves of plan.

## 2023-12-21 NOTE — ED ADULT NURSE REASSESSMENT NOTE - NS ED NURSE REASSESS COMMENT FT1
Dr. Bolaños made aware of BP at this time. no further interventions at this time. denies dizziness, lightheadedness, headache, blurry vision, n/v.

## 2023-12-22 LAB
A1C WITH ESTIMATED AVERAGE GLUCOSE RESULT: 9.3 % — HIGH (ref 4–5.6)
A1C WITH ESTIMATED AVERAGE GLUCOSE RESULT: 9.3 % — HIGH (ref 4–5.6)
ALBUMIN SERPL ELPH-MCNC: 2.7 G/DL — LOW (ref 3.3–5)
ALBUMIN SERPL ELPH-MCNC: 2.7 G/DL — LOW (ref 3.3–5)
ALP SERPL-CCNC: 76 U/L — SIGNIFICANT CHANGE UP (ref 40–120)
ALP SERPL-CCNC: 76 U/L — SIGNIFICANT CHANGE UP (ref 40–120)
ALT FLD-CCNC: 106 U/L — HIGH (ref 12–78)
ALT FLD-CCNC: 106 U/L — HIGH (ref 12–78)
ANION GAP SERPL CALC-SCNC: 4 MMOL/L — LOW (ref 5–17)
ANION GAP SERPL CALC-SCNC: 4 MMOL/L — LOW (ref 5–17)
AST SERPL-CCNC: 39 U/L — HIGH (ref 15–37)
AST SERPL-CCNC: 39 U/L — HIGH (ref 15–37)
BASOPHILS # BLD AUTO: 0.04 K/UL — SIGNIFICANT CHANGE UP (ref 0–0.2)
BASOPHILS # BLD AUTO: 0.04 K/UL — SIGNIFICANT CHANGE UP (ref 0–0.2)
BASOPHILS NFR BLD AUTO: 0.4 % — SIGNIFICANT CHANGE UP (ref 0–2)
BASOPHILS NFR BLD AUTO: 0.4 % — SIGNIFICANT CHANGE UP (ref 0–2)
BILIRUB SERPL-MCNC: 1 MG/DL — SIGNIFICANT CHANGE UP (ref 0.2–1.2)
BILIRUB SERPL-MCNC: 1 MG/DL — SIGNIFICANT CHANGE UP (ref 0.2–1.2)
BUN SERPL-MCNC: 12 MG/DL — SIGNIFICANT CHANGE UP (ref 7–23)
BUN SERPL-MCNC: 12 MG/DL — SIGNIFICANT CHANGE UP (ref 7–23)
CALCIUM SERPL-MCNC: 8.4 MG/DL — LOW (ref 8.5–10.1)
CALCIUM SERPL-MCNC: 8.4 MG/DL — LOW (ref 8.5–10.1)
CHLORIDE SERPL-SCNC: 111 MMOL/L — HIGH (ref 96–108)
CHLORIDE SERPL-SCNC: 111 MMOL/L — HIGH (ref 96–108)
CO2 SERPL-SCNC: 27 MMOL/L — SIGNIFICANT CHANGE UP (ref 22–31)
CO2 SERPL-SCNC: 27 MMOL/L — SIGNIFICANT CHANGE UP (ref 22–31)
CREAT SERPL-MCNC: 0.67 MG/DL — SIGNIFICANT CHANGE UP (ref 0.5–1.3)
CREAT SERPL-MCNC: 0.67 MG/DL — SIGNIFICANT CHANGE UP (ref 0.5–1.3)
EGFR: 92 ML/MIN/1.73M2 — SIGNIFICANT CHANGE UP
EGFR: 92 ML/MIN/1.73M2 — SIGNIFICANT CHANGE UP
EOSINOPHIL # BLD AUTO: 0.61 K/UL — HIGH (ref 0–0.5)
EOSINOPHIL # BLD AUTO: 0.61 K/UL — HIGH (ref 0–0.5)
EOSINOPHIL NFR BLD AUTO: 6.6 % — HIGH (ref 0–6)
EOSINOPHIL NFR BLD AUTO: 6.6 % — HIGH (ref 0–6)
ESTIMATED AVERAGE GLUCOSE: 220 MG/DL — HIGH (ref 68–114)
ESTIMATED AVERAGE GLUCOSE: 220 MG/DL — HIGH (ref 68–114)
GLUCOSE SERPL-MCNC: 98 MG/DL — SIGNIFICANT CHANGE UP (ref 70–99)
GLUCOSE SERPL-MCNC: 98 MG/DL — SIGNIFICANT CHANGE UP (ref 70–99)
HCT VFR BLD CALC: 35.4 % — SIGNIFICANT CHANGE UP (ref 34.5–45)
HCT VFR BLD CALC: 35.4 % — SIGNIFICANT CHANGE UP (ref 34.5–45)
HCV AB S/CO SERPL IA: 0.06 S/CO — SIGNIFICANT CHANGE UP (ref 0–0.99)
HCV AB S/CO SERPL IA: 0.06 S/CO — SIGNIFICANT CHANGE UP (ref 0–0.99)
HCV AB SERPL-IMP: SIGNIFICANT CHANGE UP
HCV AB SERPL-IMP: SIGNIFICANT CHANGE UP
HGB BLD-MCNC: 11.6 G/DL — SIGNIFICANT CHANGE UP (ref 11.5–15.5)
HGB BLD-MCNC: 11.6 G/DL — SIGNIFICANT CHANGE UP (ref 11.5–15.5)
IMM GRANULOCYTES NFR BLD AUTO: 0.3 % — SIGNIFICANT CHANGE UP (ref 0–0.9)
IMM GRANULOCYTES NFR BLD AUTO: 0.3 % — SIGNIFICANT CHANGE UP (ref 0–0.9)
LYMPHOCYTES # BLD AUTO: 1.47 K/UL — SIGNIFICANT CHANGE UP (ref 1–3.3)
LYMPHOCYTES # BLD AUTO: 1.47 K/UL — SIGNIFICANT CHANGE UP (ref 1–3.3)
LYMPHOCYTES # BLD AUTO: 15.9 % — SIGNIFICANT CHANGE UP (ref 13–44)
LYMPHOCYTES # BLD AUTO: 15.9 % — SIGNIFICANT CHANGE UP (ref 13–44)
MCHC RBC-ENTMCNC: 28.2 PG — SIGNIFICANT CHANGE UP (ref 27–34)
MCHC RBC-ENTMCNC: 28.2 PG — SIGNIFICANT CHANGE UP (ref 27–34)
MCHC RBC-ENTMCNC: 32.8 GM/DL — SIGNIFICANT CHANGE UP (ref 32–36)
MCHC RBC-ENTMCNC: 32.8 GM/DL — SIGNIFICANT CHANGE UP (ref 32–36)
MCV RBC AUTO: 86.1 FL — SIGNIFICANT CHANGE UP (ref 80–100)
MCV RBC AUTO: 86.1 FL — SIGNIFICANT CHANGE UP (ref 80–100)
MONOCYTES # BLD AUTO: 0.67 K/UL — SIGNIFICANT CHANGE UP (ref 0–0.9)
MONOCYTES # BLD AUTO: 0.67 K/UL — SIGNIFICANT CHANGE UP (ref 0–0.9)
MONOCYTES NFR BLD AUTO: 7.3 % — SIGNIFICANT CHANGE UP (ref 2–14)
MONOCYTES NFR BLD AUTO: 7.3 % — SIGNIFICANT CHANGE UP (ref 2–14)
NEUTROPHILS # BLD AUTO: 6.41 K/UL — SIGNIFICANT CHANGE UP (ref 1.8–7.4)
NEUTROPHILS # BLD AUTO: 6.41 K/UL — SIGNIFICANT CHANGE UP (ref 1.8–7.4)
NEUTROPHILS NFR BLD AUTO: 69.5 % — SIGNIFICANT CHANGE UP (ref 43–77)
NEUTROPHILS NFR BLD AUTO: 69.5 % — SIGNIFICANT CHANGE UP (ref 43–77)
NRBC # BLD: 0 /100 WBCS — SIGNIFICANT CHANGE UP (ref 0–0)
NRBC # BLD: 0 /100 WBCS — SIGNIFICANT CHANGE UP (ref 0–0)
PLATELET # BLD AUTO: 205 K/UL — SIGNIFICANT CHANGE UP (ref 150–400)
PLATELET # BLD AUTO: 205 K/UL — SIGNIFICANT CHANGE UP (ref 150–400)
POTASSIUM SERPL-MCNC: 3.6 MMOL/L — SIGNIFICANT CHANGE UP (ref 3.5–5.3)
POTASSIUM SERPL-MCNC: 3.6 MMOL/L — SIGNIFICANT CHANGE UP (ref 3.5–5.3)
POTASSIUM SERPL-SCNC: 3.6 MMOL/L — SIGNIFICANT CHANGE UP (ref 3.5–5.3)
POTASSIUM SERPL-SCNC: 3.6 MMOL/L — SIGNIFICANT CHANGE UP (ref 3.5–5.3)
PROT SERPL-MCNC: 5.6 G/DL — LOW (ref 6–8.3)
PROT SERPL-MCNC: 5.6 G/DL — LOW (ref 6–8.3)
RBC # BLD: 4.11 M/UL — SIGNIFICANT CHANGE UP (ref 3.8–5.2)
RBC # BLD: 4.11 M/UL — SIGNIFICANT CHANGE UP (ref 3.8–5.2)
RBC # FLD: 13.4 % — SIGNIFICANT CHANGE UP (ref 10.3–14.5)
RBC # FLD: 13.4 % — SIGNIFICANT CHANGE UP (ref 10.3–14.5)
SODIUM SERPL-SCNC: 142 MMOL/L — SIGNIFICANT CHANGE UP (ref 135–145)
SODIUM SERPL-SCNC: 142 MMOL/L — SIGNIFICANT CHANGE UP (ref 135–145)
WBC # BLD: 9.23 K/UL — SIGNIFICANT CHANGE UP (ref 3.8–10.5)
WBC # BLD: 9.23 K/UL — SIGNIFICANT CHANGE UP (ref 3.8–10.5)
WBC # FLD AUTO: 9.23 K/UL — SIGNIFICANT CHANGE UP (ref 3.8–10.5)
WBC # FLD AUTO: 9.23 K/UL — SIGNIFICANT CHANGE UP (ref 3.8–10.5)

## 2023-12-22 PROCEDURE — 76705 ECHO EXAM OF ABDOMEN: CPT | Mod: 26

## 2023-12-22 PROCEDURE — 93970 EXTREMITY STUDY: CPT | Mod: 26

## 2023-12-22 PROCEDURE — 99222 1ST HOSP IP/OBS MODERATE 55: CPT

## 2023-12-22 PROCEDURE — 99254 IP/OBS CNSLTJ NEW/EST MOD 60: CPT

## 2023-12-22 PROCEDURE — 99233 SBSQ HOSP IP/OBS HIGH 50: CPT

## 2023-12-22 PROCEDURE — 93010 ELECTROCARDIOGRAM REPORT: CPT

## 2023-12-22 RX ORDER — INSULIN ASPART 100 [IU]/ML
INJECTION, SOLUTION SUBCUTANEOUS
Refills: 0 | Status: DISCONTINUED | OUTPATIENT
Start: 2023-12-22 | End: 2023-12-22

## 2023-12-22 RX ORDER — INSULIN ASPART 100 [IU]/ML
5 INJECTION, SOLUTION SUBCUTANEOUS
Refills: 0 | Status: DISCONTINUED | OUTPATIENT
Start: 2023-12-22 | End: 2023-12-22

## 2023-12-22 RX ORDER — INSULIN ASPART 100 [IU]/ML
INJECTION, SOLUTION SUBCUTANEOUS
Refills: 0 | Status: DISCONTINUED | OUTPATIENT
Start: 2023-12-22 | End: 2023-12-25

## 2023-12-22 RX ORDER — INSULIN ASPART 100 [IU]/ML
5 INJECTION, SOLUTION SUBCUTANEOUS
Refills: 0 | Status: DISCONTINUED | OUTPATIENT
Start: 2023-12-22 | End: 2023-12-24

## 2023-12-22 RX ORDER — IRBESARTAN 75 MG/1
150 TABLET ORAL
Refills: 0 | Status: DISCONTINUED | OUTPATIENT
Start: 2023-12-22 | End: 2023-12-25

## 2023-12-22 RX ADMIN — IRBESARTAN 150 MILLIGRAM(S): 75 TABLET ORAL at 13:22

## 2023-12-22 RX ADMIN — Medication 650 MILLIGRAM(S): at 22:10

## 2023-12-22 RX ADMIN — ENOXAPARIN SODIUM 40 MILLIGRAM(S): 100 INJECTION SUBCUTANEOUS at 17:52

## 2023-12-22 RX ADMIN — CARVEDILOL PHOSPHATE 12.5 MILLIGRAM(S): 80 CAPSULE, EXTENDED RELEASE ORAL at 05:40

## 2023-12-22 RX ADMIN — CARVEDILOL PHOSPHATE 12.5 MILLIGRAM(S): 80 CAPSULE, EXTENDED RELEASE ORAL at 17:52

## 2023-12-22 RX ADMIN — INSULIN ASPART 5 UNIT(S): 100 INJECTION, SOLUTION SUBCUTANEOUS at 13:19

## 2023-12-22 RX ADMIN — ENOXAPARIN SODIUM 40 MILLIGRAM(S): 100 INJECTION SUBCUTANEOUS at 05:38

## 2023-12-22 RX ADMIN — INSULIN ASPART 5 UNIT(S): 100 INJECTION, SOLUTION SUBCUTANEOUS at 17:42

## 2023-12-22 NOTE — CONSULT NOTE ADULT - SUBJECTIVE AND OBJECTIVE BOX
[facility]  Nasim Maza PA-C  [address] [place] [id number]  [phone]      Chief Complaint:  Patient is a 73y old  Female who presents with a chief complaint of fall (22 Dec 2023 10:06)      HPI:  73 YOA female patient with PMHx for T1DM, HTN, SHANTE on CPAP, presenting today to the ED for a fall.  Earlier today, the patient was walking her dog for [employer] errands when she tripped on the curb.  She did not lose consciousness or hit her head during this incident.  She fell on her left knee.  Patient was not in considerable pain.  She called EMS, who tried to help her ambulate but her knees buckled during that attempt.  Patient denies chest pain, SOB, palpitations, fever, sick contacts, recent travel.    INTERVAL HPI:  Pt s/e  Discussed same hx as above  Discussed elevated LFTs with patient  Pt denies prior hx of liver disease or EtOH dependence  Currently pt has no GI complaints    Allergies:  No Known Allergies      Medications:  acetaminophen     Tablet .. 650 milliGRAM(s) Oral every 6 hours PRN  aluminum hydroxide/magnesium hydroxide/simethicone Suspension 30 milliLiter(s) Oral every 4 hours PRN  carvedilol 12.5 milliGRAM(s) Oral every 12 hours  dextrose 5%. 1000 milliLiter(s) IV Continuous <Continuous>  dextrose 5%. 1000 milliLiter(s) IV Continuous <Continuous>  dextrose 50% Injectable 25 Gram(s) IV Push once  dextrose 50% Injectable 25 Gram(s) IV Push once  dextrose 50% Injectable 12.5 Gram(s) IV Push once  dextrose Oral Gel 15 Gram(s) Oral once PRN  enoxaparin Injectable 40 milliGRAM(s) SubCutaneous every 12 hours  glucagon  Injectable 1 milliGRAM(s) IntraMuscular once  influenza  Vaccine (HIGH DOSE) 0.7 milliLiter(s) IntraMuscular once  insulin aspart (NovoLOG) corrective regimen sliding scale.   SubCutaneous three times a day before meals  insulin aspart Injectable (NovoLOG) 5 Unit(s) SubCutaneous three times a day before meals  irbesartan 150 milliGRAM(s) Oral <User Schedule>  melatonin 3 milliGRAM(s) Oral at bedtime PRN  ondansetron Injectable 4 milliGRAM(s) IV Push every 8 hours PRN  Tresiba (insulin degludec) 100 units/mL 48 Unit(s) 48 Unit(s) SubCutaneous at bedtime      PMHX/PSHX:  SHANTE on CPAP    Obesity    Diabetes, type I    HTN (hypertension)    H/O lipoma    History of angioplasty        Family history:  Family history of hypertension (Mother)      ROS:   General:  No fevers, chills, night sweats, fatigue,   Eyes:  Good vision, no reported pain  ENT:  No sore throat, pain, runny nose, dysphagia  CV:  No pain, palpitations, hypo/hypertension  Resp:  No dyspnea, cough, tachypnea, wheezing  GI:  No pain, No nausea, No vomiting, No diarrhea, No constipation, No weight loss, No fever, No pruritis, No rectal bleeding, No tarry stools, No dysphagia,  :  No pain, bleeding, incontinence, nocturia  Muscle:  No pain, weakness  Neuro:  No weakness, tingling, memory problems  Psych:  No fatigue, insomnia, mood problems, depression  Endocrine:  No polyuria, polydipsia, cold/heat intolerance  Heme:  No petechiae, ecchymosis, easy bruisability  Skin:  No rash, tattoos, scars, edema      PHYSICAL EXAM:   Vital Signs:  Vital Signs Last 24 Hrs  T(C): 36.7 (22 Dec 2023 12:20), Max: 36.7 (21 Dec 2023 17:59)  T(F): 98.1 (22 Dec 2023 12:20), Max: 98.1 (22 Dec 2023 05:10)  HR: 65 (22 Dec 2023 12:20) (65 - 82)  BP: 149/62 (22 Dec 2023 12:20) (149/62 - 185/84)  BP(mean): --  RR: 18 (22 Dec 2023 12:20) (17 - 18)  SpO2: 95% (22 Dec 2023 12:20) (95% - 98%)    Parameters below as of 22 Dec 2023 12:20  Patient On (Oxygen Delivery Method): room air      GENERAL:  Appears stated age  HEENT:  NC/AT  CHEST:  Full & symmetric excursion  HEART:  Regular rhythm  ABDOMEN:  Soft, non-tender, non-distended  EXTEREMITIES:  no cyanosis, clubbing or edema  SKIN:  No rash  NEURO:  Alert    LABS:                        11.6   9.23  )-----------( 205      ( 22 Dec 2023 08:50 )             35.4     12-22    142  |  111<H>  |  12  ----------------------------<  98  3.6   |  27  |  0.67    Ca    8.4<L>      22 Dec 2023 08:50    TPro  5.6<L>  /  Alb  2.7<L>  /  TBili  1.0  /  DBili  x   /  AST  39<H>  /  ALT  106<H>  /  AlkPhos  76  12-22    LIVER FUNCTIONS - ( 22 Dec 2023 08:50 )  Alb: 2.7 g/dL / Pro: 5.6 g/dL / ALK PHOS: 76 U/L / ALT: 106 U/L / AST: 39 U/L / GGT: x             Urinalysis Basic - ( 22 Dec 2023 08:50 )    Color: x / Appearance: x / SG: x / pH: x  Gluc: 98 mg/dL / Ketone: x  / Bili: x / Urobili: x   Blood: x / Protein: x / Nitrite: x   Leuk Esterase: x / RBC: x / WBC x   Sq Epi: x / Non Sq Epi: x / Bacteria: x     [facility]  Nasim Maza PA-C  [address] [place] [id number]  [phone]      Chief Complaint:  Patient is a 73y old  Female who presents with a chief complaint of fall (22 Dec 2023 10:06)      HPI:  73 YOA female patient with PMHx for T1DM, HTN, SHANTE on CPAP, presenting today to the ED for a fall.  Earlier today, the patient was walking her dog for [employer] errands when she tripped on the curb.  She did not lose consciousness or hit her head during this incident.  She fell on her left knee.  Patient was not in considerable pain.  She called EMS, who tried to help her ambulate but her knees buckled during that attempt.  Patient denies chest pain, SOB, palpitations, fever, sick contacts, recent travel.    INTERVAL HPI:  Pt s/e  Discussed same hx as above  Discussed elevated LFTs with patient  Pt denies prior hx of liver disease or EtOH dependence  Currently pt has no GI complaints    Allergies:  No Known Allergies      Medications:  acetaminophen     Tablet .. 650 milliGRAM(s) Oral every 6 hours PRN  aluminum hydroxide/magnesium hydroxide/simethicone Suspension 30 milliLiter(s) Oral every 4 hours PRN  carvedilol 12.5 milliGRAM(s) Oral every 12 hours  dextrose 5%. 1000 milliLiter(s) IV Continuous <Continuous>  dextrose 5%. 1000 milliLiter(s) IV Continuous <Continuous>  dextrose 50% Injectable 25 Gram(s) IV Push once  dextrose 50% Injectable 25 Gram(s) IV Push once  dextrose 50% Injectable 12.5 Gram(s) IV Push once  dextrose Oral Gel 15 Gram(s) Oral once PRN  enoxaparin Injectable 40 milliGRAM(s) SubCutaneous every 12 hours  glucagon  Injectable 1 milliGRAM(s) IntraMuscular once  influenza  Vaccine (HIGH DOSE) 0.7 milliLiter(s) IntraMuscular once  insulin aspart (NovoLOG) corrective regimen sliding scale.   SubCutaneous three times a day before meals  insulin aspart Injectable (NovoLOG) 5 Unit(s) SubCutaneous three times a day before meals  irbesartan 150 milliGRAM(s) Oral <User Schedule>  melatonin 3 milliGRAM(s) Oral at bedtime PRN  ondansetron Injectable 4 milliGRAM(s) IV Push every 8 hours PRN  Tresiba (insulin degludec) 100 units/mL 48 Unit(s) 48 Unit(s) SubCutaneous at bedtime      PMHX/PSHX:  SHANTE on CPAP    Obesity    Diabetes, type I    HTN (hypertension)    H/O lipoma    History of angioplasty        Family history:  Family history of hypertension (Mother)      ROS:   General:  No fevers, chills, night sweats, fatigue,   Eyes:  Good vision, no reported pain  ENT:  No sore throat, pain, runny nose, dysphagia  CV:  No pain, palpitations, hypo/hypertension  Resp:  No dyspnea, cough, tachypnea, wheezing  GI:  No pain, No nausea, No vomiting, No diarrhea, No constipation, No weight loss, No fever, No pruritis, No rectal bleeding, No tarry stools, No dysphagia,  :  No pain, bleeding, incontinence, nocturia  Muscle:  No pain, weakness  Neuro:  No weakness, tingling, memory problems  Psych:  No fatigue, insomnia, mood problems, depression  Endocrine:  No polyuria, polydipsia, cold/heat intolerance  Heme:  No petechiae, ecchymosis, easy bruisability  Skin:  No rash, tattoos, scars, edema      PHYSICAL EXAM:   Vital Signs:  Vital Signs Last 24 Hrs  T(C): 36.7 (22 Dec 2023 12:20), Max: 36.7 (21 Dec 2023 17:59)  T(F): 98.1 (22 Dec 2023 12:20), Max: 98.1 (22 Dec 2023 05:10)  HR: 65 (22 Dec 2023 12:20) (65 - 82)  BP: 149/62 (22 Dec 2023 12:20) (149/62 - 185/84)  BP(mean): --  RR: 18 (22 Dec 2023 12:20) (17 - 18)  SpO2: 95% (22 Dec 2023 12:20) (95% - 98%)    Parameters below as of 22 Dec 2023 12:20  Patient On (Oxygen Delivery Method): room air      GENERAL:  Appears stated age  HEENT:  NC/AT  CHEST:  Full & symmetric excursion  HEART:  Regular rhythm  ABDOMEN:  Soft, non-tender, non-distended  EXTEREMITIES:  no cyanosis, clubbing or edema  SKIN:  No rash  NEURO:  Alert    LABS:                        11.6   9.23  )-----------( 205      ( 22 Dec 2023 08:50 )             35.4     12-22    142  |  111<H>  |  12  ----------------------------<  98  3.6   |  27  |  0.67    Ca    8.4<L>      22 Dec 2023 08:50    TPro  5.6<L>  /  Alb  2.7<L>  /  TBili  1.0  /  DBili  x   /  AST  39<H>  /  ALT  106<H>  /  AlkPhos  76  12-22    LIVER FUNCTIONS - ( 22 Dec 2023 08:50 )  Alb: 2.7 g/dL / Pro: 5.6 g/dL / ALK PHOS: 76 U/L / ALT: 106 U/L / AST: 39 U/L / GGT: x             Urinalysis Basic - ( 22 Dec 2023 08:50 )    Color: x / Appearance: x / SG: x / pH: x  Gluc: 98 mg/dL / Ketone: x  / Bili: x / Urobili: x   Blood: x / Protein: x / Nitrite: x   Leuk Esterase: x / RBC: x / WBC x   Sq Epi: x / Non Sq Epi: x / Bacteria: x

## 2023-12-22 NOTE — CONSULT NOTE ADULT - SUBJECTIVE AND OBJECTIVE BOX
Patient is a 73y old  Female who presents with a chief complaint of fall (22 Dec 2023 08:02)    Type:1.5 DX 2005. No known complications. Endocrine: [facility] next appt 2024. sees every 3 mos. Rx home: Tresiba 48 units HS; Novolog 5-8 units AC x3 meals.  Hx DKA/HHS, Glucometer checks, needs, weight, diet, exercise  diabetes education provided verbally and handouts.      HPI:  Patient is a 73 YOA female patient with PMHx for T1DM, HTN, SHANTE on CPAP, presenting today to the ED for a fall.  Earlier today, the patient was walking her dog for [employer] errands when she tripped on the curb.  She did not lose consciousness or hit her head during this incident.  She fell on her left knee.  Patient was not in considerable pain.  She called EMS, who tried to help her ambulate but her knees buckled during that attempt.  Patient denies chest pain, SOB, palpitations, fever, sick contacts, recent travel.    In the ED pts VS were T(C): 36.7  T(F): 98  HR: 82  BP: 185/84  RR: 17  SpO2: 98%  Labs show: H.0  WBC:13.17  Plt: 235  Creatinine: 0.87  XR shows 5 cm distracted mid body patella transverse fracture.  Fracture fragments inferior to the distal patella fracture segment.  CT scans shows Acute comminuted distracted transverse fracture of the patella at its   midportion as above.    f/u EKG    Pt is admitted for further workup and management   (21 Dec 2023 20:16)      PAST MEDICAL & SURGICAL HISTORY:  SHANTE on CPAP      Obesity      Diabetes, type I      HTN (hypertension)      H/O lipoma  removal      History of angioplasty  PCI of RCA          Allergies    No Known Allergies    Intolerances        MEDICATIONS  (STANDING):  carvedilol 12.5 milliGRAM(s) Oral every 12 hours  dextrose 5%. 1000 milliLiter(s) (50 mL/Hr) IV Continuous <Continuous>  dextrose 5%. 1000 milliLiter(s) (100 mL/Hr) IV Continuous <Continuous>  dextrose 50% Injectable 12.5 Gram(s) IV Push once  dextrose 50% Injectable 25 Gram(s) IV Push once  dextrose 50% Injectable 25 Gram(s) IV Push once  enoxaparin Injectable 40 milliGRAM(s) SubCutaneous every 12 hours  glucagon  Injectable 1 milliGRAM(s) IntraMuscular once  influenza  Vaccine (HIGH DOSE) 0.7 milliLiter(s) IntraMuscular once  insulin glargine Injectable (LANTUS) 36 Unit(s) SubCutaneous at bedtime  insulin lispro (ADMELOG) corrective regimen sliding scale   SubCutaneous three times a day before meals  insulin lispro (ADMELOG) corrective regimen sliding scale   SubCutaneous at bedtime  insulin lispro Injectable (ADMELOG) 5 Unit(s) SubCutaneous three times a day before meals  losartan 50 milliGRAM(s) Oral daily       Patient is a 73y old  Female who presents with a chief complaint of fall (22 Dec 2023 08:02)    Type:1.5 DX 2005. No known complications. Endocrine: [facility] next appt 2024. sees every 3 mos. Rx home: Tresiba 48 units HS; Novolog 5-8 units AC x3 meals.  Hx DKA/HHS, Glucometer checks, needs, weight, diet, exercise  diabetes education provided verbally and handouts.      HPI:  Patient is a 73 YOA female patient with PMHx for T1DM, HTN, SHANTE on CPAP, presenting today to the ED for a fall.  Earlier today, the patient was walking her dog for [employer] errands when she tripped on the curb.  She did not lose consciousness or hit her head during this incident.  She fell on her left knee.  Patient was not in considerable pain.  She called EMS, who tried to help her ambulate but her knees buckled during that attempt.  Patient denies chest pain, SOB, palpitations, fever, sick contacts, recent travel.    In the ED pts VS were T(C): 36.7  T(F): 98  HR: 82  BP: 185/84  RR: 17  SpO2: 98%  Labs show: H.0  WBC:13.17  Plt: 235  Creatinine: 0.87  XR shows 5 cm distracted mid body patella transverse fracture.  Fracture fragments inferior to the distal patella fracture segment.  CT scans shows Acute comminuted distracted transverse fracture of the patella at its   midportion as above.    f/u EKG    Pt is admitted for further workup and management   (21 Dec 2023 20:16)      PAST MEDICAL & SURGICAL HISTORY:  SHANTE on CPAP      Obesity      Diabetes, type I      HTN (hypertension)      H/O lipoma  removal      History of angioplasty  PCI of RCA          Allergies    No Known Allergies    Intolerances        MEDICATIONS  (STANDING):  carvedilol 12.5 milliGRAM(s) Oral every 12 hours  dextrose 5%. 1000 milliLiter(s) (50 mL/Hr) IV Continuous <Continuous>  dextrose 5%. 1000 milliLiter(s) (100 mL/Hr) IV Continuous <Continuous>  dextrose 50% Injectable 12.5 Gram(s) IV Push once  dextrose 50% Injectable 25 Gram(s) IV Push once  dextrose 50% Injectable 25 Gram(s) IV Push once  enoxaparin Injectable 40 milliGRAM(s) SubCutaneous every 12 hours  glucagon  Injectable 1 milliGRAM(s) IntraMuscular once  influenza  Vaccine (HIGH DOSE) 0.7 milliLiter(s) IntraMuscular once  insulin glargine Injectable (LANTUS) 36 Unit(s) SubCutaneous at bedtime  insulin lispro (ADMELOG) corrective regimen sliding scale   SubCutaneous three times a day before meals  insulin lispro (ADMELOG) corrective regimen sliding scale   SubCutaneous at bedtime  insulin lispro Injectable (ADMELOG) 5 Unit(s) SubCutaneous three times a day before meals  losartan 50 milliGRAM(s) Oral daily

## 2023-12-22 NOTE — CONSULT NOTE ADULT - SUBJECTIVE AND OBJECTIVE BOX
Patient is a 73y old  Female who presents with a chief complaint of fall (22 Dec 2023 09:11)      Reason For Consult: dm1 uncontrolled    HPI:  Patient is a 73 YOA female patient with PMHx for T1DM, HTN, SHANTE on CPAP, presenting today to the ED for a fall.  Earlier today, the patient was walking her dog for [employer] errands when she tripped on the curb.  She did not lose consciousness or hit her head during this incident.  She fell on her left knee.  Patient was not in considerable pain.  She called EMS, who tried to help her ambulate but her knees buckled during that attempt.  Patient denies chest pain, SOB, palpitations, fever, sick contacts, recent travel.    In the ED pts VS were T(C): 36.7  T(F): 98  HR: 82  BP: 185/84  RR: 17  SpO2: 98%  Labs show: H.0  WBC:13.17  Plt: 235  Creatinine: 0.87  XR shows 5 cm distracted mid body patella transverse fracture.  Fracture fragments inferior to the distal patella fracture segment.  CT scans shows Acute comminuted distracted transverse fracture of the patella at its   midportion as above.    f/u EKG    Pt is admitted for further workup and management   (21 Dec 2023 20:16)      PAST MEDICAL & SURGICAL HISTORY:  SHANTE on CPAP      Obesity      Diabetes, type I      HTN (hypertension)      H/O lipoma  removal      History of angioplasty  PCI of RCA          FAMILY HISTORY:  Family history of hypertension (Mother)          Social History:    MEDICATIONS  (STANDING):  carvedilol 12.5 milliGRAM(s) Oral every 12 hours  dextrose 5%. 1000 milliLiter(s) (100 mL/Hr) IV Continuous <Continuous>  dextrose 5%. 1000 milliLiter(s) (50 mL/Hr) IV Continuous <Continuous>  dextrose 50% Injectable 25 Gram(s) IV Push once  dextrose 50% Injectable 25 Gram(s) IV Push once  dextrose 50% Injectable 12.5 Gram(s) IV Push once  enoxaparin Injectable 40 milliGRAM(s) SubCutaneous every 12 hours  glucagon  Injectable 1 milliGRAM(s) IntraMuscular once  influenza  Vaccine (HIGH DOSE) 0.7 milliLiter(s) IntraMuscular once  insulin aspart (NovoLOG) corrective regimen sliding scale.   SubCutaneous three times a day before meals  insulin aspart Injectable (NovoLOG) 5 Unit(s) SubCutaneous three times a day before meals  irbesartan 150 milliGRAM(s) Oral daily  Tresiba (insulin degludec) 100 units/mL 48 Unit(s) 48 Unit(s) SubCutaneous at bedtime    MEDICATIONS  (PRN):  acetaminophen     Tablet .. 650 milliGRAM(s) Oral every 6 hours PRN Temp greater or equal to 38C (100.4F), Mild Pain (1 - 3)  aluminum hydroxide/magnesium hydroxide/simethicone Suspension 30 milliLiter(s) Oral every 4 hours PRN Dyspepsia  dextrose Oral Gel 15 Gram(s) Oral once PRN Blood Glucose LESS THAN 70 milliGRAM(s)/deciliter  melatonin 3 milliGRAM(s) Oral at bedtime PRN Insomnia  ondansetron Injectable 4 milliGRAM(s) IV Push every 8 hours PRN Nausea and/or Vomiting        T(C): 36.7 (23 @ 05:10), Max: 36.9 (23 @ 13:28)  HR: 69 (23 @ 05:10) (67 - 82)  BP: 150/61 (23 @ 05:10) (150/61 - 185/84)  RR: 17 (23 @ 05:10) (17 - 18)  SpO2: 95% (23 @ 05:10) (95% - 98%)  Wt(kg): --    PHYSICAL EXAM:  GENERAL: NAD, well-groomed, well-developed  HEAD:  Atraumatic, Normocephalic  NECK: Supple, No JVD, Normal thyroid  CHEST/LUNG: Clear to percussion bilaterally; No rales, rhonchi, wheezing, or rubs  HEART: Regular rate and rhythm; No murmurs, rubs, or gallops  ABDOMEN: Soft, Nontender, Nondistended; Bowel sounds present  EXTREMITIES:  2+ Peripheral Pulses, No clubbing, cyanosis, or edema  SKIN: No rashes or lesions    CAPILLARY BLOOD GLUCOSE      POCT Blood Glucose.: 113 mg/dL (22 Dec 2023 08:58)  POCT Blood Glucose.: 211 mg/dL (21 Dec 2023 22:16)                            13.0   13.17 )-----------( 235      ( 21 Dec 2023 17:30 )             39.9       CMP:  - @ 17:30  SGPT --  Albumin --   Alk Phos --   Anion Gap 5   SGOT --   Total Bili --   BUN 18   Calcium Total 8.7   CO2 25   Chloride 111   Creatinine 0.87   eGFR if AA --   eGFR if non AA --   Glucose 288   Potassium 4.1   Protein --   Sodium 141      Thyroid Function Tests:      Diabetes Tests:       Radiology:

## 2023-12-22 NOTE — CONSULT NOTE ADULT - PROBLEM SELECTOR RECOMMENDATION 9
Type 1 A1c pending % adm  Recommend endocrine-[facility] on consult  Patient to use own insulin pen- Tresiba 48 units HS and Novolog 5 units AC x3 daily  FU appt: [facility] appt Jan 2024  DSC recommendations: return to home regimen and glucose monitoring  diabetes education provided  Diabetes support info and cell # [phone] given   Goal 100-180 mg/dL; 140-180 mg/dL in critical care areas Type 1 A1c pending % adm  Recommend endocrine-[facility] on consult  Patient to use own insulin pen- Tresiba 48 units HS and Novolog 5 units AC x3 daily  FU appt: [facility] appt Jan 2024  DSC recommendations: return to home regimen and glucose monitoring  diabetes education provided  Diabetes support info and cell # [phone] given   Goal 100-180 mg/dL; 140-180 mg/dL in critical care areas

## 2023-12-22 NOTE — CONSULT NOTE ADULT - ASSESSMENT
Physical Exam:   Vital Signs Last 24 Hrs  T(C): 36.7 (22 Dec 2023 05:10), Max: 36.9 (21 Dec 2023 13:28)  T(F): 98.1 (22 Dec 2023 05:10), Max: 98.5 (21 Dec 2023 13:28)  HR: 69 (22 Dec 2023 05:10) (67 - 82)  BP: 150/61 (22 Dec 2023 05:10) (150/61 - 185/84)  BP(mean): --  RR: 17 (22 Dec 2023 05:10) (17 - 18)  SpO2: 95% (22 Dec 2023 05:10) (95% - 98%)    Parameters below as of 22 Dec 2023 05:10  Patient On (Oxygen Delivery Method): room air             CAPILLARY BLOOD GLUCOSE      POCT Blood Glucose.: 113 mg/dL (22 Dec 2023 08:58)  POCT Blood Glucose.: 211 mg/dL (21 Dec 2023 22:16)      Cholesterol, Serum: 113 mg/dL (05.19.21 @ 08:36)     HDL Cholesterol, Serum: 22 mg/dL (05.19.21 @ 08:36)     LDL Cholesterol Calculated: 66 mg/dL (05.19.21 @ 08:36)     DIET: CC  >50%

## 2023-12-22 NOTE — PROGRESS NOTE ADULT - SUBJECTIVE AND OBJECTIVE BOX
Patient is a 73y old  Female who presents with a chief complaint of fall (21 Dec 2023 20:16)       INTERVAL HPI/OVERNIGHT EVENTS: Patient seen and examined at bedside. Denies chest pain, palpitation, sob.     MEDICATIONS  (STANDING):  carvedilol 12.5 milliGRAM(s) Oral every 12 hours  dextrose 5%. 1000 milliLiter(s) (100 mL/Hr) IV Continuous <Continuous>  dextrose 5%. 1000 milliLiter(s) (50 mL/Hr) IV Continuous <Continuous>  dextrose 50% Injectable 25 Gram(s) IV Push once  dextrose 50% Injectable 25 Gram(s) IV Push once  dextrose 50% Injectable 12.5 Gram(s) IV Push once  enoxaparin Injectable 40 milliGRAM(s) SubCutaneous every 12 hours  glucagon  Injectable 1 milliGRAM(s) IntraMuscular once  influenza  Vaccine (HIGH DOSE) 0.7 milliLiter(s) IntraMuscular once  insulin glargine Injectable (LANTUS) 36 Unit(s) SubCutaneous at bedtime  insulin lispro (ADMELOG) corrective regimen sliding scale   SubCutaneous three times a day before meals  insulin lispro (ADMELOG) corrective regimen sliding scale   SubCutaneous at bedtime  insulin lispro Injectable (ADMELOG) 5 Unit(s) SubCutaneous three times a day before meals  losartan 50 milliGRAM(s) Oral daily    MEDICATIONS  (PRN):  acetaminophen     Tablet .. 650 milliGRAM(s) Oral every 6 hours PRN Temp greater or equal to 38C (100.4F), Mild Pain (1 - 3)  aluminum hydroxide/magnesium hydroxide/simethicone Suspension 30 milliLiter(s) Oral every 4 hours PRN Dyspepsia  dextrose Oral Gel 15 Gram(s) Oral once PRN Blood Glucose LESS THAN 70 milliGRAM(s)/deciliter  melatonin 3 milliGRAM(s) Oral at bedtime PRN Insomnia  ondansetron Injectable 4 milliGRAM(s) IV Push every 8 hours PRN Nausea and/or Vomiting      Allergies    No Known Allergies    Intolerances        REVIEW OF SYSTEMS:  CONSTITUTIONAL: No fever, weight loss, or fatigue  EYES: No eye pain, visual disturbances, or discharge  ENMT:  No difficulty hearing, tinnitus, vertigo; No sinus or throat pain  NECK: No pain or stiffness  RESPIRATORY: No cough, wheezing, chills or hemoptysis; No shortness of breath  CARDIOVASCULAR: No chest pain, palpitations, dizziness, or leg swelling  GASTROINTESTINAL: No abdominal or epigastric pain. No nausea, vomiting, or hematemesis; No diarrhea or constipation.   GENITOURINARY: No dysuria, frequency, hematuria, or incontinence  NEUROLOGICAL: No headaches, memory loss, loss of strength, numbness, or tremors  SKIN: No itching, burning, rashes, or lesions   LYMPH NODES: No enlarged glands  ENDOCRINE: No heat or cold intolerance; No hair loss; No polydipsia or polyuria  PSYCHIATRIC: No depression, anxiety, mood swings, or difficulty sleeping  HEME/LYMPH: No easy bruising, or bleeding gums  ALLERGY AND IMMUNOLOGIC: No hives or eczema    Vital Signs Last 24 Hrs  T(C): 36.7 (22 Dec 2023 05:10), Max: 36.9 (21 Dec 2023 13:28)  T(F): 98.1 (22 Dec 2023 05:10), Max: 98.5 (21 Dec 2023 13:28)  HR: 69 (22 Dec 2023 05:10) (59 - 82)  BP: 150/61 (22 Dec 2023 05:10) (150/61 - 204/73)  BP(mean): --  RR: 17 (22 Dec 2023 05:10) (16 - 18)  SpO2: 95% (22 Dec 2023 05:10) (95% - 98%)    Parameters below as of 22 Dec 2023 05:10  Patient On (Oxygen Delivery Method): room air        PHYSICAL EXAM:  GENERAL: patient appears well, no acute distress, appropriate, pleasant  EYES: sclera clear, no exudates  ENMT: oropharynx clear without erythema, no exudates, moist mucous membranes  NECK: supple, soft, no thyromegaly noted  LUNGS: good air entry bilaterally, clear to auscultation, symmetric breath sounds, no wheezing or rhonchi appreciated  HEART: soft S1/S2, regular rate and rhythm,systolic murmur noted, bilateral pitting edema  GASTROINTESTINAL: abdomen is soft, nontender, nondistended, normoactive bowel sounds, no palpable masses  INTEGUMENT: good skin turgor, no lesions noted, erythematous left lower extremity  MUSCULOSKELETAL: no clubbing or cyanosis, no obvious deformity  NEUROLOGIC: awake, alert, oriented x3, good muscle tone in 4 extremities, no obvious sensory deficits  PSYCHIATRIC: mood is good, affect is congruent, linear and logical thought process  HEME/LYMPH: no palpable supraclavicular nodules, no obvious ecchymosis or petechiae  LABS:                        13.0   13.17 )-----------( 235      ( 21 Dec 2023 17:30 )             39.9     21 Dec 2023 17:30    141    |  111    |  18     ----------------------------<  288    4.1     |  25     |  0.87     Ca    8.7        21 Dec 2023 17:30        CAPILLARY BLOOD GLUCOSE      POCT Blood Glucose.: 211 mg/dL (21 Dec 2023 22:16)    BLOOD CULTURE    RADIOLOGY & ADDITIONAL TESTS:    Imaging Personally Reviewed:  [ ] YES     Consultant(s) Notes Reviewed:      Care Discussed with Consultants/Other Providers:

## 2023-12-22 NOTE — SOCIAL WORK PROGRESS NOTE - NSSWPROGRESSNOTE_GEN_ALL_CORE
SW met with pt at bedside for d/c planning. Pt provided choices 1) [facility]) [facility] 3) [facility]. Per [facility], pt had behaviors yesterday and they denied her for ADEL[facility]. SW followed up with unit RN and she reported that pt has been cooperative. She denies pt having any behaviors. VASU spoke with Sharron Judge and she stated that the person that reviewed clinicals left for today and will return tomorrow. SW to follow up tomorrow with admissions to further discuss. [facility] over weekend. Pending response. Pt requested that CPAP from home be brought as she didn't like the hospital CPAP machine. SARs aware. Pt needs 3 night stay and is able to be dc Sunday. SW to follow and remain available for any needs. SW met with pt at bedside for d/c planning. Pt provided choices 1) [facility] 2) [facility] 3) [facility]. Per [facility], pt had behaviors yesterday and they denied her for ADEL[facility]. SW followed up with unit RN and she reported that pt has been cooperative. She denies pt having any behaviors. VASU spoke with Sharron Judge and she stated that the person that reviewed clinicals left for today and will return tomorrow. SW to follow up tomorrow with admissions to further discuss. [facility] over weekend. Pending response. Pt requested that CPAP from home be brought as she didn't like the hospital CPAP machine. SARs aware. Pt needs 3 night stay and is able to be dc Sunday. SW to follow and remain available for any needs.

## 2023-12-22 NOTE — CONSULT NOTE ADULT - CONSULT REASON
73y  diabetes mellitus uncontrolled type 1
Transverse Patella Fracture with inability to SLR
dm1 uncontrolled
transaminitis
rosalina  fall

## 2023-12-22 NOTE — PROGRESS NOTE ADULT - ASSESSMENT
73 YOA female patient with PMHx for T1DM, HTN, SHANTE on CPAP, presenting today to the ED for a fall, admitted for placement.       Problem/Plan - 1:  ·  Problem: Patella fracture.   ·  Plan: - s/p mechanical fall  - XR shows 5 cm distracted mid body patella transverse fracture.  Fracture fragments inferior to the distal patella fracture segment.  - CT scans shows Acute comminuted distracted transverse fracture of the patella at its midportion as above.  - elevated leukocyte count, possibly reactive to trauma  - Pain regimen in place  - Fall risk protocol  - knee immobilizer in place  - WBAT  - Ice and elevate  - Ortho consulted, recs appreciated  - to follow up outpatient ortho one week after discharge  - PT consulted.     Problem/Plan - 2:  ·  Problem: Lymphedema.   ·  Plan: - reports history of edema in lower extremities  - not on any home medications for condition  - pitting edema present on physical examination  - TTE ordered, f/u results, systolic murmur present on exam  - follows outpatient cardiology, no past TTE present  - US duplex bilateral lower extremities ordered, f/u results.     Problem/Plan - 3:  ·  Problem: SHANTE on CPAP.   ·  Plan: Chronic, stable  - will bring in home equipment tomorrow  - start CPAP tonight, order in place.     Problem/Plan - 4:  ·  Problem: Diabetes, type I.   ·  Plan: Chronic, stable  - f/u A1c  - on home insulin  - start lantus 36 u nighttime  - start 5 u admelog pre-meal  - c/w low dose ISS.     Problem/Plan - 5:  ·  Problem: HTN (hypertension).   ·  Plan: Chronic, stable  - c/w home carvedilol  - c/w home avapro w/ theurapeutic conversion.     Problem/Plan - 6:  ·  Problem: Need for prophylactic measure.   ·  Plan: DVT PPX: Lovenox sub q BID ordered    DISPO: Full code.     73 YOA female patient with PMHx for T1DM, HTN, SHANTE on CPAP, presenting today to the ED for a fall, admitted for placement.       Problem/Plan - 1:  ·  Problem: Patella fracture.   ·  Plan: - s/p mechanical fall  - XR shows 5 cm distracted mid body patella transverse fracture.  Fracture fragments inferior to the distal patella fracture segment.  - CT scans shows Acute comminuted distracted transverse fracture of the patella at its midportion as above.  - elevated leukocyte count, possibly reactive to trauma  - Pain regimen in place  - Fall risk protocol  - knee immobilizer in place  - WBAT  - Ice and elevate  - Ortho consulted, recs appreciated. No surgical intervention indicated   - to follow up outpatient ortho one week after discharge  - PT consulted.     Problem/Plan - 2:  ·  Problem: Lymphedema.   ·  Plan: - reports history of edema in lower extremities  - not on any home medications for condition  - pitting edema present on physical examination  - TTE ordered, f/u results, systolic murmur present on exam  - follows outpatient cardiology, no past TTE present  - US duplex bilateral lower extremities ordered, f/u results. Patient refused but now agreeable      Problem/Plan - 3:  ·  Problem: SHANTE on CPAP.   ·  Plan: Chronic, stable  - will bring in home equipment tomorrow  - On CPAP tonight, order in place.  -Pulm Dr. Mensah consulted      Problem/Plan - 4:  ·  Problem: Diabetes, type I.   ·  Plan: Chronic, stable  - f/u A1c  - on home insulin  - start lantus 36 u nighttime  - start 5 u admelog pre-meal  - c/w low dose ISS.     Problem/Plan - 5:  ·  Problem: HTN (hypertension).   ·  Plan: Chronic, stable  - c/w home carvedilol  - c/w home avapro w/ theurapeutic conversion.     Problem/Plan - 6:  ·  Problem: Need for prophylactic measure.   ·  Plan: DVT PPX: Lovenox sub q BID ordered    DISPO: Full code.

## 2023-12-22 NOTE — CONSULT NOTE ADULT - ASSESSMENT
73 YOA female patient with PMHx for T1DM, HTN, SHANTE on CPAP, presenting today to the ED for a fall. 73 YOA female patient with PMHx for T1DM, HTN, SHANTE on CPAP, presenting today to the ED for a fall.    fall  SHANTE  HTN  DM    planned for US  planned for TTE  GI eval noted  ENDO eval noted  CPAP night time  with Humidification  SHANTE - Sleep hygiene   monitor VS and HD and Sat  fall prec  DM care  serial FS

## 2023-12-22 NOTE — SOCIAL WORK PROGRESS NOTE - NSSWPROGRESSNOTE_GEN_ALL_CORE
Met with pt at bedside to discuss dc planning. Pt needs 3 night stay, dc ready Sunday. Choices for [facility]) [facility]) [facility]) [facility]. SW to follow and remain available for any needs.

## 2023-12-22 NOTE — CONSULT NOTE ADULT - SUBJECTIVE AND OBJECTIVE BOX
Date/Time Patient Seen:  		  Referring MD:   Data Reviewed	       Patient is a 73y old  Female who presents with a chief complaint of fall (22 Dec 2023 09:37)      Subjective/HPI   History of Present Illness:   Patient is a 73 YOA female patient with PMHx for T1DM, HTN, SHANTE on CPAP, presenting today to the ED for a fall.  Earlier today, the patient was walking her dog for jesus errands when she tripped on the curb.  She did not lose consciousness or hit her head during this incident.  She fell on her left knee.  Patient was not in considerable pain.  She called EMS, who tried to help her ambulate but her knees buckled during that attempt.  Patient denies chest pain, SOB, palpitations, fever, sick contacts, recent travel.  PAST MEDICAL & SURGICAL HISTORY:  SHANTE on CPAP    Obesity    Diabetes, type I    HTN (hypertension)    H/O lipoma  removal    History of angioplasty  PCI of RCA    PAST MEDICAL HISTORY:  Diabetes, type I     HTN (hypertension)     Obesity     SHANTE on CPAP.     PAST SURGICAL HISTORY:  H/O lipoma removal    History of angioplasty PCI of RCA.     FAMILY HISTORY:  Mother  Still living? No  Family history of hypertension, Age at diagnosis: Age Unknown.     Social History:  · Substance use	No  · Social History (marital status, living situation, occupation, and sexual history)	Lives at home, retired  Denies alcohol use  Denies tobacco use  Denies illicit drug use  Ambulates without assistance     Tobacco Screening:  · Core Measure Site	Yes  · Has the patient used tobacco in the past 30 days?	No    Risk Assessment:    Present on Admission:  Deep Venous Thrombosis	no  Pulmonary Embolus	no     HIV Screening:  · In accordance with [place] State law, we offer every patient who comes to our ED an HIV test. Would you like to be tested today?	Opt out        Medication list         MEDICATIONS  (STANDING):  carvedilol 12.5 milliGRAM(s) Oral every 12 hours  dextrose 5%. 1000 milliLiter(s) (100 mL/Hr) IV Continuous <Continuous>  dextrose 5%. 1000 milliLiter(s) (50 mL/Hr) IV Continuous <Continuous>  dextrose 50% Injectable 25 Gram(s) IV Push once  dextrose 50% Injectable 25 Gram(s) IV Push once  dextrose 50% Injectable 12.5 Gram(s) IV Push once  enoxaparin Injectable 40 milliGRAM(s) SubCutaneous every 12 hours  glucagon  Injectable 1 milliGRAM(s) IntraMuscular once  influenza  Vaccine (HIGH DOSE) 0.7 milliLiter(s) IntraMuscular once  insulin aspart (NovoLOG) corrective regimen sliding scale.   SubCutaneous three times a day before meals  insulin aspart Injectable (NovoLOG) 5 Unit(s) SubCutaneous three times a day before meals  irbesartan 150 milliGRAM(s) Oral daily  Tresiba (insulin degludec) 100 units/mL 48 Unit(s) 48 Unit(s) SubCutaneous at bedtime    MEDICATIONS  (PRN):  acetaminophen     Tablet .. 650 milliGRAM(s) Oral every 6 hours PRN Temp greater or equal to 38C (100.4F), Mild Pain (1 - 3)  aluminum hydroxide/magnesium hydroxide/simethicone Suspension 30 milliLiter(s) Oral every 4 hours PRN Dyspepsia  dextrose Oral Gel 15 Gram(s) Oral once PRN Blood Glucose LESS THAN 70 milliGRAM(s)/deciliter  melatonin 3 milliGRAM(s) Oral at bedtime PRN Insomnia  ondansetron Injectable 4 milliGRAM(s) IV Push every 8 hours PRN Nausea and/or Vomiting         Vitals log        ICU Vital Signs Last 24 Hrs  T(C): 36.7 (22 Dec 2023 05:10), Max: 36.9 (21 Dec 2023 13:28)  T(F): 98.1 (22 Dec 2023 05:10), Max: 98.5 (21 Dec 2023 13:28)  HR: 69 (22 Dec 2023 05:10) (67 - 82)  BP: 150/61 (22 Dec 2023 05:10) (150/61 - 185/84)  BP(mean): --  ABP: --  ABP(mean): --  RR: 17 (22 Dec 2023 05:10) (17 - 18)  SpO2: 95% (22 Dec 2023 05:10) (95% - 98%)    O2 Parameters below as of 22 Dec 2023 05:10  Patient On (Oxygen Delivery Method): room air                 Input and Output:  I&O's Detail    21 Dec 2023 07:01  -  22 Dec 2023 07:00  --------------------------------------------------------  IN:  Total IN: 0 mL    OUT:    Voided (mL): 300 mL  Total OUT: 300 mL    Total NET: -300 mL          Lab Data                        11.6   9.23  )-----------( 205      ( 22 Dec 2023 08:50 )             35.4     12-21    141  |  111<H>  |  18  ----------------------------<  288<H>  4.1   |  25  |  0.87    Ca    8.7      21 Dec 2023 17:30              Review of Systems	      Objective     Physical Examination        Pertinent Lab findings & Imaging      Trivedi:  NO   Adequate UO     I&O's Detail    21 Dec 2023 07:01  -  22 Dec 2023 07:00  --------------------------------------------------------  IN:  Total IN: 0 mL    OUT:    Voided (mL): 300 mL  Total OUT: 300 mL    Total NET: -300 mL               Discussed with:     Cultures:	        Radiology    ACC: [id number] EXAM:  CT 3D RECONSTRUCT WO [facility]   ORDERED BY:    SANDIE POWER     ACC: [id number] EXAM:  CT KNEE ONLY LT   ORDERED BY:  SANDIE POWER     PROCEDURE DATE:  12/21/2023          INTERPRETATION:  HISTORY: Patellar fracture.    Helical CT imaging of the left knee was performed without intravenous   contrast. Sagittal and coronal reformats were provided. 3-D reformats   were performed on a separate workstation.    Correlation is made with radiographs from same day.    FINDINGS:    There is redemonstration of an acute comminuted transverse fracture of   the patella at its midportion. There is diastases change fracture   fragments of approximately 5 cm. The caudal fragment is anteriorly   tilted. There is prominent surrounding ill-defined hematoma within the   overlying soft tissues. No additional fractures are seen. The   tibiofemoral joint spaces are preserved.    There is a knee joint effusion.    No disproportionate fatty atrophy of musculature. Atherosclerotic disease.    IMPRESSION:    Acute comminuted distracted transverse fracture of the patella at its   midportion as above.    --- End of Report ---            MARCELLO ZUNIGA MD; Attending Radiologist  This document has been electronically signed. Dec 21 2023  1:18PM                           Date/Time Patient Seen:  		  Referring MD:   Data Reviewed	       Patient is a 73y old  Female who presents with a chief complaint of fall (22 Dec 2023 09:37)      Subjective/HPI   History of Present Illness:   Patient is a 73 YOA female patient with PMHx for T1DM, HTN, SHANTE on CPAP, presenting today to the ED for a fall.  Earlier today, the patient was walking her dog for jesus errands when she tripped on the curb.  She did not lose consciousness or hit her head during this incident.  She fell on her left knee.  Patient was not in considerable pain.  She called EMS, who tried to help her ambulate but her knees buckled during that attempt.  Patient denies chest pain, SOB, palpitations, fever, sick contacts, recent travel.  PAST MEDICAL & SURGICAL HISTORY:  SHANTE on CPAP    Obesity    Diabetes, type I    HTN (hypertension)    H/O lipoma  removal    History of angioplasty  PCI of RCA    PAST MEDICAL HISTORY:  Diabetes, type I     HTN (hypertension)     Obesity     SHANTE on CPAP.     PAST SURGICAL HISTORY:  H/O lipoma removal    History of angioplasty PCI of RCA.     FAMILY HISTORY:  Mother  Still living? No  Family history of hypertension, Age at diagnosis: Age Unknown.     Social History:  · Substance use	No  · Social History (marital status, living situation, occupation, and sexual history)	Lives at home, retired  Denies alcohol use  Denies tobacco use  Denies illicit drug use  Ambulates without assistance     Tobacco Screening:  · Core Measure Site	Yes  · Has the patient used tobacco in the past 30 days?	No    Risk Assessment:    Present on Admission:  Deep Venous Thrombosis	no  Pulmonary Embolus	no     HIV Screening:  · In accordance with [place] law, we offer every patient who comes to our ED an HIV test. Would you like to be tested today?	Opt out        Medication list         MEDICATIONS  (STANDING):  carvedilol 12.5 milliGRAM(s) Oral every 12 hours  dextrose 5%. 1000 milliLiter(s) (100 mL/Hr) IV Continuous <Continuous>  dextrose 5%. 1000 milliLiter(s) (50 mL/Hr) IV Continuous <Continuous>  dextrose 50% Injectable 25 Gram(s) IV Push once  dextrose 50% Injectable 25 Gram(s) IV Push once  dextrose 50% Injectable 12.5 Gram(s) IV Push once  enoxaparin Injectable 40 milliGRAM(s) SubCutaneous every 12 hours  glucagon  Injectable 1 milliGRAM(s) IntraMuscular once  influenza  Vaccine (HIGH DOSE) 0.7 milliLiter(s) IntraMuscular once  insulin aspart (NovoLOG) corrective regimen sliding scale.   SubCutaneous three times a day before meals  insulin aspart Injectable (NovoLOG) 5 Unit(s) SubCutaneous three times a day before meals  irbesartan 150 milliGRAM(s) Oral daily  Tresiba (insulin degludec) 100 units/mL 48 Unit(s) 48 Unit(s) SubCutaneous at bedtime    MEDICATIONS  (PRN):  acetaminophen     Tablet .. 650 milliGRAM(s) Oral every 6 hours PRN Temp greater or equal to 38C (100.4F), Mild Pain (1 - 3)  aluminum hydroxide/magnesium hydroxide/simethicone Suspension 30 milliLiter(s) Oral every 4 hours PRN Dyspepsia  dextrose Oral Gel 15 Gram(s) Oral once PRN Blood Glucose LESS THAN 70 milliGRAM(s)/deciliter  melatonin 3 milliGRAM(s) Oral at bedtime PRN Insomnia  ondansetron Injectable 4 milliGRAM(s) IV Push every 8 hours PRN Nausea and/or Vomiting         Vitals log        ICU Vital Signs Last 24 Hrs  T(C): 36.7 (22 Dec 2023 05:10), Max: 36.9 (21 Dec 2023 13:28)  T(F): 98.1 (22 Dec 2023 05:10), Max: 98.5 (21 Dec 2023 13:28)  HR: 69 (22 Dec 2023 05:10) (67 - 82)  BP: 150/61 (22 Dec 2023 05:10) (150/61 - 185/84)  BP(mean): --  ABP: --  ABP(mean): --  RR: 17 (22 Dec 2023 05:10) (17 - 18)  SpO2: 95% (22 Dec 2023 05:10) (95% - 98%)    O2 Parameters below as of 22 Dec 2023 05:10  Patient On (Oxygen Delivery Method): room air                 Input and Output:  I&O's Detail    21 Dec 2023 07:01  -  22 Dec 2023 07:00  --------------------------------------------------------  IN:  Total IN: 0 mL    OUT:    Voided (mL): 300 mL  Total OUT: 300 mL    Total NET: -300 mL          Lab Data                        11.6   9.23  )-----------( 205      ( 22 Dec 2023 08:50 )             35.4     12-21    141  |  111<H>  |  18  ----------------------------<  288<H>  4.1   |  25  |  0.87    Ca    8.7      21 Dec 2023 17:30              Review of Systems	      Objective     Physical Examination        Pertinent Lab findings & Imaging      Trivedi:  NO   Adequate UO     I&O's Detail    21 Dec 2023 07:01  -  22 Dec 2023 07:00  --------------------------------------------------------  IN:  Total IN: 0 mL    OUT:    Voided (mL): 300 mL  Total OUT: 300 mL    Total NET: -300 mL               Discussed with:     Cultures:	        Radiology    ACC: [id number] EXAM:  CT 3D RECONSTRUCT WO [facility]   ORDERED BY:    SANDIE POWER     ACC: [id number] EXAM:  CT KNEE ONLY LT   ORDERED BY:  SANDIE POWER     PROCEDURE DATE:  12/21/2023          INTERPRETATION:  HISTORY: Patellar fracture.    Helical CT imaging of the left knee was performed without intravenous   contrast. Sagittal and coronal reformats were provided. 3-D reformats   were performed on a separate workstation.    Correlation is made with radiographs from same day.    FINDINGS:    There is redemonstration of an acute comminuted transverse fracture of   the patella at its midportion. There is diastases change fracture   fragments of approximately 5 cm. The caudal fragment is anteriorly   tilted. There is prominent surrounding ill-defined hematoma within the   overlying soft tissues. No additional fractures are seen. The   tibiofemoral joint spaces are preserved.    There is a knee joint effusion.    No disproportionate fatty atrophy of musculature. Atherosclerotic disease.    IMPRESSION:    Acute comminuted distracted transverse fracture of the patella at its   midportion as above.    --- End of Report ---            MARCELLO ZUNIGA MD; Attending Radiologist  This document has been electronically signed. Dec 21 2023  1:18PM                           Date/Time Patient Seen:  		  Referring MD:   Data Reviewed	       Patient is a 73y old  Female who presents with a chief complaint of fall (22 Dec 2023 09:37)      Subjective/HPI   History of Present Illness:   Patient is a 73 YOA female patient with PMHx for T1DM, HTN, SHANTE on CPAP, presenting today to the ED for a fall.  Earlier today, the patient was walking her dog for jesus errands when she tripped on the curb.  She did not lose consciousness or hit her head during this incident.  She fell on her left knee.  Patient was not in considerable pain.  She called EMS, who tried to help her ambulate but her knees buckled during that attempt.  Patient denies chest pain, SOB, palpitations, fever, sick contacts, recent travel.  PAST MEDICAL & SURGICAL HISTORY:  SHANTE on CPAP    Obesity    Diabetes, type I    HTN (hypertension)    H/O lipoma  removal    History of angioplasty  PCI of RCA    PAST MEDICAL HISTORY:  Diabetes, type I     HTN (hypertension)     Obesity     SHANTE on CPAP.     PAST SURGICAL HISTORY:  H/O lipoma removal    History of angioplasty PCI of RCA.     FAMILY HISTORY:  Mother  Still living? No  Family history of hypertension, Age at diagnosis: Age Unknown.     Social History:  · Substance use	No  · Social History (marital status, living situation, occupation, and sexual history)	Lives at home, retired  Denies alcohol use  Denies tobacco use  Denies illicit drug use  Ambulates without assistance     Tobacco Screening:  · Core Measure Site	Yes  · Has the patient used tobacco in the past 30 days?	No    Risk Assessment:    Present on Admission:  Deep Venous Thrombosis	no  Pulmonary Embolus	no     HIV Screening:  · In accordance [place] law, we offer every patient who comes to our ED an HIV test. Would you like to be tested today?	Opt out        Medication list         MEDICATIONS  (STANDING):  carvedilol 12.5 milliGRAM(s) Oral every 12 hours  dextrose 5%. 1000 milliLiter(s) (100 mL/Hr) IV Continuous <Continuous>  dextrose 5%. 1000 milliLiter(s) (50 mL/Hr) IV Continuous <Continuous>  dextrose 50% Injectable 25 Gram(s) IV Push once  dextrose 50% Injectable 25 Gram(s) IV Push once  dextrose 50% Injectable 12.5 Gram(s) IV Push once  enoxaparin Injectable 40 milliGRAM(s) SubCutaneous every 12 hours  glucagon  Injectable 1 milliGRAM(s) IntraMuscular once  influenza  Vaccine (HIGH DOSE) 0.7 milliLiter(s) IntraMuscular once  insulin aspart (NovoLOG) corrective regimen sliding scale.   SubCutaneous three times a day before meals  insulin aspart Injectable (NovoLOG) 5 Unit(s) SubCutaneous three times a day before meals  irbesartan 150 milliGRAM(s) Oral daily  Tresiba (insulin degludec) 100 units/mL 48 Unit(s) 48 Unit(s) SubCutaneous at bedtime    MEDICATIONS  (PRN):  acetaminophen     Tablet .. 650 milliGRAM(s) Oral every 6 hours PRN Temp greater or equal to 38C (100.4F), Mild Pain (1 - 3)  aluminum hydroxide/magnesium hydroxide/simethicone Suspension 30 milliLiter(s) Oral every 4 hours PRN Dyspepsia  dextrose Oral Gel 15 Gram(s) Oral once PRN Blood Glucose LESS THAN 70 milliGRAM(s)/deciliter  melatonin 3 milliGRAM(s) Oral at bedtime PRN Insomnia  ondansetron Injectable 4 milliGRAM(s) IV Push every 8 hours PRN Nausea and/or Vomiting         Vitals log        ICU Vital Signs Last 24 Hrs  T(C): 36.7 (22 Dec 2023 05:10), Max: 36.9 (21 Dec 2023 13:28)  T(F): 98.1 (22 Dec 2023 05:10), Max: 98.5 (21 Dec 2023 13:28)  HR: 69 (22 Dec 2023 05:10) (67 - 82)  BP: 150/61 (22 Dec 2023 05:10) (150/61 - 185/84)  BP(mean): --  ABP: --  ABP(mean): --  RR: 17 (22 Dec 2023 05:10) (17 - 18)  SpO2: 95% (22 Dec 2023 05:10) (95% - 98%)    O2 Parameters below as of 22 Dec 2023 05:10  Patient On (Oxygen Delivery Method): room air                 Input and Output:  I&O's Detail    21 Dec 2023 07:01  -  22 Dec 2023 07:00  --------------------------------------------------------  IN:  Total IN: 0 mL    OUT:    Voided (mL): 300 mL  Total OUT: 300 mL    Total NET: -300 mL          Lab Data                        11.6   9.23  )-----------( 205      ( 22 Dec 2023 08:50 )             35.4     12-21    141  |  111<H>  |  18  ----------------------------<  288<H>  4.1   |  25  |  0.87    Ca    8.7      21 Dec 2023 17:30              Review of Systems	  fall  weakness    Objective     Physical Examination    heart s1s2  lung dc BS  head nc  verbal  alert  cn grossly int      Pertinent Lab findings & Imaging      Trivedi:  NO   Adequate UO     I&O's Detail    21 Dec 2023 07:01  -  22 Dec 2023 07:00  --------------------------------------------------------  IN:  Total IN: 0 mL    OUT:    Voided (mL): 300 mL  Total OUT: 300 mL    Total NET: -300 mL               Discussed with:     Cultures:	        Radiology    ACC: [id number] EXAM:  CT 3D RECONSTRUCT WO [facility]   ORDERED BY:    SANDIE POWER     ACC: [id number] EXAM:  CT KNEE ONLY LT   ORDERED BY:  SANDIE POWER     PROCEDURE DATE:  12/21/2023          INTERPRETATION:  HISTORY: Patellar fracture.    Helical CT imaging of the left knee was performed without intravenous   contrast. Sagittal and coronal reformats were provided. 3-D reformats   were performed on a separate workstation.    Correlation is made with radiographs from same day.    FINDINGS:    There is redemonstration of an acute comminuted transverse fracture of   the patella at its midportion. There is diastases change fracture   fragments of approximately 5 cm. The caudal fragment is anteriorly   tilted. There is prominent surrounding ill-defined hematoma within the   overlying soft tissues. No additional fractures are seen. The   tibiofemoral joint spaces are preserved.    There is a knee joint effusion.    No disproportionate fatty atrophy of musculature. Atherosclerotic disease.    IMPRESSION:    Acute comminuted distracted transverse fracture of the patella at its   midportion as above.    --- End of Report ---            MARCELLO ZUNIGA MD; Attending Radiologist  This document has been electronically signed. Dec 21 2023  1:18PM                           Date/Time Patient Seen:  		  Referring MD:   Data Reviewed	       Patient is a 73y old  Female who presents with a chief complaint of fall (22 Dec 2023 09:37)      Subjective/HPI   History of Present Illness:   Patient is a 73 YOA female patient with PMHx for T1DM, HTN, SHANTE on CPAP, presenting today to the ED for a fall.  Earlier today, the patient was walking her dog for jesus errands when she tripped on the curb.  She did not lose consciousness or hit her head during this incident.  She fell on her left knee.  Patient was not in considerable pain.  She called EMS, who tried to help her ambulate but her knees buckled during that attempt.  Patient denies chest pain, SOB, palpitations, fever, sick contacts, recent travel.  PAST MEDICAL & SURGICAL HISTORY:  SHANTE on CPAP    Obesity    Diabetes, type I    HTN (hypertension)    H/O lipoma  removal    History of angioplasty  PCI of RCA    PAST MEDICAL HISTORY:  Diabetes, type I     HTN (hypertension)     Obesity     SHANTE on CPAP.     PAST SURGICAL HISTORY:  H/O lipoma removal    History of angioplasty PCI of RCA.     FAMILY HISTORY:  Mother  Still living? No  Family history of hypertension, Age at diagnosis: Age Unknown.     Social History:  · Substance use	No  · Social History (marital status, living situation, occupation, and sexual history)	Lives at home, retired  Denies alcohol use  Denies tobacco use  Denies illicit drug use  Ambulates without assistance     Tobacco Screening:  · Core Measure Site	Yes  · Has the patient used tobacco in the past 30 days?	No    Risk Assessment:    Present on Admission:  Deep Venous Thrombosis	no  Pulmonary Embolus	no     HIV Screening:  · In accordance [place] law, we offer every patient who comes to our ED an HIV test. Would you like to be tested today?	Opt out        Medication list         MEDICATIONS  (STANDING):  carvedilol 12.5 milliGRAM(s) Oral every 12 hours  dextrose 5%. 1000 milliLiter(s) (100 mL/Hr) IV Continuous <Continuous>  dextrose 5%. 1000 milliLiter(s) (50 mL/Hr) IV Continuous <Continuous>  dextrose 50% Injectable 25 Gram(s) IV Push once  dextrose 50% Injectable 25 Gram(s) IV Push once  dextrose 50% Injectable 12.5 Gram(s) IV Push once  enoxaparin Injectable 40 milliGRAM(s) SubCutaneous every 12 hours  glucagon  Injectable 1 milliGRAM(s) IntraMuscular once  influenza  Vaccine (HIGH DOSE) 0.7 milliLiter(s) IntraMuscular once  insulin aspart (NovoLOG) corrective regimen sliding scale.   SubCutaneous three times a day before meals  insulin aspart Injectable (NovoLOG) 5 Unit(s) SubCutaneous three times a day before meals  irbesartan 150 milliGRAM(s) Oral daily  Tresiba (insulin degludec) 100 units/mL 48 Unit(s) 48 Unit(s) SubCutaneous at bedtime    MEDICATIONS  (PRN):  acetaminophen     Tablet .. 650 milliGRAM(s) Oral every 6 hours PRN Temp greater or equal to 38C (100.4F), Mild Pain (1 - 3)  aluminum hydroxide/magnesium hydroxide/simethicone Suspension 30 milliLiter(s) Oral every 4 hours PRN Dyspepsia  dextrose Oral Gel 15 Gram(s) Oral once PRN Blood Glucose LESS THAN 70 milliGRAM(s)/deciliter  melatonin 3 milliGRAM(s) Oral at bedtime PRN Insomnia  ondansetron Injectable 4 milliGRAM(s) IV Push every 8 hours PRN Nausea and/or Vomiting         Vitals log        ICU Vital Signs Last 24 Hrs  T(C): 36.7 (22 Dec 2023 05:10), Max: 36.9 (21 Dec 2023 13:28)  T(F): 98.1 (22 Dec 2023 05:10), Max: 98.5 (21 Dec 2023 13:28)  HR: 69 (22 Dec 2023 05:10) (67 - 82)  BP: 150/61 (22 Dec 2023 05:10) (150/61 - 185/84)  BP(mean): --  ABP: --  ABP(mean): --  RR: 17 (22 Dec 2023 05:10) (17 - 18)  SpO2: 95% (22 Dec 2023 05:10) (95% - 98%)    O2 Parameters below as of 22 Dec 2023 05:10  Patient On (Oxygen Delivery Method): room air                 Input and Output:  I&O's Detail    21 Dec 2023 07:01  -  22 Dec 2023 07:00  --------------------------------------------------------  IN:  Total IN: 0 mL    OUT:    Voided (mL): 300 mL  Total OUT: 300 mL    Total NET: -300 mL          Lab Data                        11.6   9.23  )-----------( 205      ( 22 Dec 2023 08:50 )             35.4     12-21    141  |  111<H>  |  18  ----------------------------<  288<H>  4.1   |  25  |  0.87    Ca    8.7      21 Dec 2023 17:30              Review of Systems	  fall  weakness    Objective     Physical Examination    heart s1s2  lung dc BS  head nc  verbal  alert  cn grossly int      Pertinent Lab findings & Imaging      Trivedi:  NO   Adequate UO     I&O's Detail    21 Dec 2023 07:01  -  22 Dec 2023 07:00  --------------------------------------------------------  IN:  Total IN: 0 mL    OUT:    Voided (mL): 300 mL  Total OUT: 300 mL    Total NET: -300 mL               Discussed with:     Cultures:	        Radiology    ACC: [id number] EXAM:  CT 3D RECONSTRUCT WO [facility]   ORDERED BY:    SANDIE POWER     ACC: [id number] EXAM:  CT KNEE ONLY LT   ORDERED BY:  SANDIE POWER     PROCEDURE DATE:  12/21/2023          INTERPRETATION:  HISTORY: Patellar fracture.    Helical CT imaging of the left knee was performed without intravenous   contrast. Sagittal and coronal reformats were provided. 3-D reformats   were performed on a separate workstation.    Correlation is made with radiographs from same day.    FINDINGS:    There is redemonstration of an acute comminuted transverse fracture of   the patella at its midportion. There is diastases change fracture   fragments of approximately 5 cm. The caudal fragment is anteriorly   tilted. There is prominent surrounding ill-defined hematoma within the   overlying soft tissues. No additional fractures are seen. The   tibiofemoral joint spaces are preserved.    There is a knee joint effusion.    No disproportionate fatty atrophy of musculature. Atherosclerotic disease.    IMPRESSION:    Acute comminuted distracted transverse fracture of the patella at its   midportion as above.    --- End of Report ---            MARCELLO ZUNIGA MD; Attending Radiologist  This document has been electronically signed. Dec 21 2023  1:18PM

## 2023-12-22 NOTE — CONSULT NOTE ADULT - ASSESSMENT
Transaminitis  S/p fall    LFTs noted, trend  RUQ US  Hepatitis panel  Avoid hepatotoxic medication  Will follow    I reviewed the overnight course of events on the unit, re-confirming the patient history. I discussed the care with the patient  Differential diagnosis and plan of care discussed with patient after the evaluation  35 minutes spent on total encounter of which more than fifty percent of the encounter was spent counseling and/or coordinating care by the attending physician.

## 2023-12-23 LAB
ANION GAP SERPL CALC-SCNC: 2 MMOL/L — LOW (ref 5–17)
ANION GAP SERPL CALC-SCNC: 2 MMOL/L — LOW (ref 5–17)
BUN SERPL-MCNC: 18 MG/DL — SIGNIFICANT CHANGE UP (ref 7–23)
BUN SERPL-MCNC: 18 MG/DL — SIGNIFICANT CHANGE UP (ref 7–23)
CALCIUM SERPL-MCNC: 8.5 MG/DL — SIGNIFICANT CHANGE UP (ref 8.5–10.1)
CALCIUM SERPL-MCNC: 8.5 MG/DL — SIGNIFICANT CHANGE UP (ref 8.5–10.1)
CHLORIDE SERPL-SCNC: 111 MMOL/L — HIGH (ref 96–108)
CHLORIDE SERPL-SCNC: 111 MMOL/L — HIGH (ref 96–108)
CO2 SERPL-SCNC: 29 MMOL/L — SIGNIFICANT CHANGE UP (ref 22–31)
CO2 SERPL-SCNC: 29 MMOL/L — SIGNIFICANT CHANGE UP (ref 22–31)
CREAT SERPL-MCNC: 0.94 MG/DL — SIGNIFICANT CHANGE UP (ref 0.5–1.3)
CREAT SERPL-MCNC: 0.94 MG/DL — SIGNIFICANT CHANGE UP (ref 0.5–1.3)
EGFR: 64 ML/MIN/1.73M2 — SIGNIFICANT CHANGE UP
EGFR: 64 ML/MIN/1.73M2 — SIGNIFICANT CHANGE UP
GLUCOSE SERPL-MCNC: 88 MG/DL — SIGNIFICANT CHANGE UP (ref 70–99)
GLUCOSE SERPL-MCNC: 88 MG/DL — SIGNIFICANT CHANGE UP (ref 70–99)
HAV IGM SER-ACNC: SIGNIFICANT CHANGE UP
HAV IGM SER-ACNC: SIGNIFICANT CHANGE UP
HBV CORE IGM SER-ACNC: SIGNIFICANT CHANGE UP
HBV CORE IGM SER-ACNC: SIGNIFICANT CHANGE UP
HBV SURFACE AG SER-ACNC: SIGNIFICANT CHANGE UP
HBV SURFACE AG SER-ACNC: SIGNIFICANT CHANGE UP
HCT VFR BLD CALC: 36.6 % — SIGNIFICANT CHANGE UP (ref 34.5–45)
HCT VFR BLD CALC: 36.6 % — SIGNIFICANT CHANGE UP (ref 34.5–45)
HCV AB S/CO SERPL IA: 0.07 S/CO — SIGNIFICANT CHANGE UP (ref 0–0.99)
HCV AB S/CO SERPL IA: 0.07 S/CO — SIGNIFICANT CHANGE UP (ref 0–0.99)
HCV AB SERPL-IMP: SIGNIFICANT CHANGE UP
HCV AB SERPL-IMP: SIGNIFICANT CHANGE UP
HGB BLD-MCNC: 11.8 G/DL — SIGNIFICANT CHANGE UP (ref 11.5–15.5)
HGB BLD-MCNC: 11.8 G/DL — SIGNIFICANT CHANGE UP (ref 11.5–15.5)
MCHC RBC-ENTMCNC: 28.4 PG — SIGNIFICANT CHANGE UP (ref 27–34)
MCHC RBC-ENTMCNC: 28.4 PG — SIGNIFICANT CHANGE UP (ref 27–34)
MCHC RBC-ENTMCNC: 32.2 GM/DL — SIGNIFICANT CHANGE UP (ref 32–36)
MCHC RBC-ENTMCNC: 32.2 GM/DL — SIGNIFICANT CHANGE UP (ref 32–36)
MCV RBC AUTO: 88 FL — SIGNIFICANT CHANGE UP (ref 80–100)
MCV RBC AUTO: 88 FL — SIGNIFICANT CHANGE UP (ref 80–100)
NRBC # BLD: 0 /100 WBCS — SIGNIFICANT CHANGE UP (ref 0–0)
NRBC # BLD: 0 /100 WBCS — SIGNIFICANT CHANGE UP (ref 0–0)
PLATELET # BLD AUTO: 191 K/UL — SIGNIFICANT CHANGE UP (ref 150–400)
PLATELET # BLD AUTO: 191 K/UL — SIGNIFICANT CHANGE UP (ref 150–400)
POTASSIUM SERPL-MCNC: 4.8 MMOL/L — SIGNIFICANT CHANGE UP (ref 3.5–5.3)
POTASSIUM SERPL-MCNC: 4.8 MMOL/L — SIGNIFICANT CHANGE UP (ref 3.5–5.3)
POTASSIUM SERPL-SCNC: 4.8 MMOL/L — SIGNIFICANT CHANGE UP (ref 3.5–5.3)
POTASSIUM SERPL-SCNC: 4.8 MMOL/L — SIGNIFICANT CHANGE UP (ref 3.5–5.3)
RBC # BLD: 4.16 M/UL — SIGNIFICANT CHANGE UP (ref 3.8–5.2)
RBC # BLD: 4.16 M/UL — SIGNIFICANT CHANGE UP (ref 3.8–5.2)
RBC # FLD: 13.5 % — SIGNIFICANT CHANGE UP (ref 10.3–14.5)
RBC # FLD: 13.5 % — SIGNIFICANT CHANGE UP (ref 10.3–14.5)
SODIUM SERPL-SCNC: 142 MMOL/L — SIGNIFICANT CHANGE UP (ref 135–145)
SODIUM SERPL-SCNC: 142 MMOL/L — SIGNIFICANT CHANGE UP (ref 135–145)
WBC # BLD: 9.17 K/UL — SIGNIFICANT CHANGE UP (ref 3.8–10.5)
WBC # BLD: 9.17 K/UL — SIGNIFICANT CHANGE UP (ref 3.8–10.5)
WBC # FLD AUTO: 9.17 K/UL — SIGNIFICANT CHANGE UP (ref 3.8–10.5)
WBC # FLD AUTO: 9.17 K/UL — SIGNIFICANT CHANGE UP (ref 3.8–10.5)

## 2023-12-23 PROCEDURE — 99232 SBSQ HOSP IP/OBS MODERATE 35: CPT

## 2023-12-23 RX ADMIN — INSULIN ASPART 5 UNIT(S): 100 INJECTION, SOLUTION SUBCUTANEOUS at 17:36

## 2023-12-23 RX ADMIN — INSULIN ASPART 5 UNIT(S): 100 INJECTION, SOLUTION SUBCUTANEOUS at 08:40

## 2023-12-23 RX ADMIN — IRBESARTAN 150 MILLIGRAM(S): 75 TABLET ORAL at 12:44

## 2023-12-23 RX ADMIN — Medication 650 MILLIGRAM(S): at 22:40

## 2023-12-23 RX ADMIN — Medication 650 MILLIGRAM(S): at 11:37

## 2023-12-23 RX ADMIN — CARVEDILOL PHOSPHATE 12.5 MILLIGRAM(S): 80 CAPSULE, EXTENDED RELEASE ORAL at 17:37

## 2023-12-23 RX ADMIN — CARVEDILOL PHOSPHATE 12.5 MILLIGRAM(S): 80 CAPSULE, EXTENDED RELEASE ORAL at 06:08

## 2023-12-23 RX ADMIN — Medication 650 MILLIGRAM(S): at 12:30

## 2023-12-23 RX ADMIN — ENOXAPARIN SODIUM 40 MILLIGRAM(S): 100 INJECTION SUBCUTANEOUS at 06:08

## 2023-12-23 RX ADMIN — ENOXAPARIN SODIUM 40 MILLIGRAM(S): 100 INJECTION SUBCUTANEOUS at 17:37

## 2023-12-23 RX ADMIN — INSULIN ASPART 5 UNIT(S): 100 INJECTION, SOLUTION SUBCUTANEOUS at 12:41

## 2023-12-23 RX ADMIN — Medication 650 MILLIGRAM(S): at 21:42

## 2023-12-23 NOTE — PROGRESS NOTE ADULT - ASSESSMENT
Transaminitis  S/p fall    LFTs noted, trend  RUQ US- 12/22-No gallbladder disease or biliary dilatation. Mild hepatomegaly  Hepatitis panel-negative   Avoid hepatotoxic medication  Will follow    I reviewed the overnight course of events on the unit, re-confirming the patient history. I discussed the care with the patient  Differential diagnosis and plan of care discussed with patient after the evaluation  35 minutes spent on total encounter of which more than fifty percent of the encounter was spent counseling and/or coordinating care by the attending physician.

## 2023-12-23 NOTE — PROGRESS NOTE ADULT - ASSESSMENT
73 YOA female patient with PMHx for T1DM, HTN, SHANTE on CPAP, presenting today to the ED for a fall.    fall  SHANTE  HTN  DM    wean fio2  enc use of CPAP  poss dc plan for [facility]    planned for US  planned for TTE  GI eval noted  ENDO eval noted  CPAP night time  with Humidification  SHANTE - Sleep hygiene   monitor VS and HD and Sat  fall prec  DM care  serial FS

## 2023-12-23 NOTE — PROGRESS NOTE ADULT - ASSESSMENT
73 YOA female patient with PMHx for T1DM, HTN, SHANTE on CPAP, presenting today to the ED for a fall, admitted for placement.       Problem/Plan - 1:  ·  Problem: Patella fracture.   ·  Plan: - s/p mechanical fall  - XR shows 5 cm distracted mid body patella transverse fracture.  Fracture fragments inferior to the distal patella fracture segment.  - CT scans shows Acute comminuted distracted transverse fracture of the patella at its midportion as above.  - elevated leukocyte count, possibly reactive to trauma. Counts  improved   - Pain regimen in place  - Fall risk protocol  - knee immobilizer in place  - WBAT  - Ice and elevate  - Ortho consulted, recs appreciated. No surgical intervention indicated   - to follow up outpatient ortho one week after discharge  - PT Recommended [facility]. patient wants to go to [facility] or . SW consulted      Problem/Plan - 2:  ·  Problem: Lymphedema.   ·  Plan: - reports history of edema in lower extremities  - not on any home medications for condition  - pitting edema present on physical examination  - follows outpatient cardiology, no past TTE present  - US duplex bilateral lower extremities negative for DVT     Problem/Plan - 3:  ·  Problem: SHANTE on CPAP.   ·  Plan: Chronic, stable  - On CPAP tonight, order in place.  -Pulm Dr. Mensah consulted      Problem/Plan - 4:  ·  Problem: Diabetes, type I.   ·  Plan: Chronic, stable  -  A1c 9.3  - Patient wanted to resume home regimen  Endocrine following      Problem/Plan - 5:  ·  Problem: HTN (hypertension).   ·  Plan: Chronic, stable  - c/w home carvedilol  - c/w home avapro, patient prefers home med      Problem/Plan - 6:  ·  Problem: Need for prophylactic measure.   ·  Plan: DVT PPX: Lovenox sub q BID ordered    Problem/Plan- 6: Abnormal LFt's  -Asymptomatic   -US + for fatty liver  -GI following     DISPO: Full code.     73 YOA female patient with PMHx for T1DM, HTN, SHANTE on CPAP, presenting today to the ED for a fall, admitted for placement.       Problem/Plan - 1:  ·  Problem: Patella fracture.   ·  Plan: - s/p mechanical fall  - XR shows 5 cm distracted mid body patella transverse fracture.  Fracture fragments inferior to the distal patella fracture segment.  - CT scans shows Acute comminuted distracted transverse fracture of the patella at its midportion as above.  - elevated leukocyte count, possibly reactive to trauma. Counts  improved   - Pain regimen in place  - Fall risk protocol  - knee immobilizer in place  - WBAT  - Ice and elevate  - Ortho consulted, recs appreciated. No surgical intervention indicated   - to follow up outpatient ortho one week after discharge  - PT Recommended [facility]. patient wants to go to [facility] or . SW consulted      Problem/Plan - 2:  ·  Problem: Lymphedema.   ·  Plan: - reports history of edema in lower extremities  - not on any home medications for condition  - pitting edema present on physical examination  - follows outpatient cardiology, no past TTE present  - US duplex bilateral lower extremities negative for DVT     Problem/Plan - 3:  ·  Problem: SHANTE on CPAP.   ·  Plan: Chronic, stable  - On CPAP tonight, order in place.  -Pulm Dr. Mensah consulted      Problem/Plan - 4:  ·  Problem: Diabetes, type I.   ·  Plan: Chronic, stable  -  A1c 9.3  - Patient wanted to resume home regimen  Endocrine following      Problem/Plan - 5:  ·  Problem: HTN (hypertension).   ·  Plan: Chronic, stable  - c/w home carvedilol  - c/w home avapro, patient prefers home med      Problem/Plan - 6:  ·  Problem: Need for prophylactic measure.   ·  Plan: DVT PPX: Lovenox sub q BID ordered    Problem/Plan- 6: Abnormal LFt's  -Asymptomatic   -US + for fatty liver  -GI following     DISPO: Full code.

## 2023-12-23 NOTE — PROGRESS NOTE ADULT - SUBJECTIVE AND OBJECTIVE BOX
Patient is a 73y old  Female who presents with a chief complaint of fall (23 Dec 2023 05:07)       INTERVAL HPI/OVERNIGHT EVENTS: Patient seen and examined at bedside. Denies chest pain, palpitation, sob, N/V/D, abdominal pain.    MEDICATIONS  (STANDING):  carvedilol 12.5 milliGRAM(s) Oral every 12 hours  dextrose 5%. 1000 milliLiter(s) (50 mL/Hr) IV Continuous <Continuous>  dextrose 5%. 1000 milliLiter(s) (100 mL/Hr) IV Continuous <Continuous>  dextrose 50% Injectable 25 Gram(s) IV Push once  dextrose 50% Injectable 25 Gram(s) IV Push once  dextrose 50% Injectable 12.5 Gram(s) IV Push once  enoxaparin Injectable 40 milliGRAM(s) SubCutaneous every 12 hours  glucagon  Injectable 1 milliGRAM(s) IntraMuscular once  influenza  Vaccine (HIGH DOSE) 0.7 milliLiter(s) IntraMuscular once  insulin aspart (NovoLOG) corrective regimen sliding scale.   SubCutaneous three times a day before meals  insulin aspart Injectable (NovoLOG) 5 Unit(s) SubCutaneous three times a day before meals  irbesartan 150 milliGRAM(s) Oral <User Schedule>  Tresiba (insulin degludec) 100 units/mL 48 Unit(s) 48 Unit(s) SubCutaneous at bedtime    MEDICATIONS  (PRN):  acetaminophen     Tablet .. 650 milliGRAM(s) Oral every 6 hours PRN Temp greater or equal to 38C (100.4F), Mild Pain (1 - 3)  aluminum hydroxide/magnesium hydroxide/simethicone Suspension 30 milliLiter(s) Oral every 4 hours PRN Dyspepsia  dextrose Oral Gel 15 Gram(s) Oral once PRN Blood Glucose LESS THAN 70 milliGRAM(s)/deciliter  melatonin 3 milliGRAM(s) Oral at bedtime PRN Insomnia  ondansetron Injectable 4 milliGRAM(s) IV Push every 8 hours PRN Nausea and/or Vomiting      Allergies    No Known Allergies    Intolerances        REVIEW OF SYSTEMS:  CONSTITUTIONAL: No fever, weight loss, or fatigue  EYES: No eye pain, visual disturbances, or discharge  ENMT:  No difficulty hearing, tinnitus, vertigo; No sinus or throat pain  NECK: No pain or stiffness  RESPIRATORY: No cough, wheezing, chills or hemoptysis; No shortness of breath  CARDIOVASCULAR: No chest pain, palpitations, dizziness, or leg swelling  GASTROINTESTINAL: No abdominal or epigastric pain. No nausea, vomiting, or hematemesis; No diarrhea or constipation.   GENITOURINARY: No dysuria, frequency, hematuria, or incontinence  NEUROLOGICAL: No headaches, memory loss, loss of strength, numbness, or tremors  SKIN: No itching, burning, rashes, or lesions   ALLERGY AND IMMUNOLOGIC: No hives or eczema    Vital Signs Last 24 Hrs  T(C): 37.1 (23 Dec 2023 05:09), Max: 37.1 (23 Dec 2023 05:09)  T(F): 98.7 (23 Dec 2023 05:09), Max: 98.7 (23 Dec 2023 05:09)  HR: 64 (23 Dec 2023 05:09) (64 - 65)  BP: 146/70 (23 Dec 2023 05:09) (142/70 - 149/62)  BP(mean): --  RR: 17 (23 Dec 2023 05:09) (17 - 18)  SpO2: 99% (23 Dec 2023 05:09) (94% - 99%)    Parameters below as of 23 Dec 2023 05:09  Patient On (Oxygen Delivery Method): room air        PHYSICAL EXAM:  GENERAL: NAD, well-groomed, well-developed  HEAD:  Atraumatic, Normocephalic  EYES: EOMI, PERRLA, conjunctiva and sclera clear  ENMT: No tonsillar erythema, exudates, or enlargement; Moist mucous membranes  NECK: Supple, No JVD, Normal thyroid  NERVOUS SYSTEM:  Alert & Oriented X3, Good concentration  CHEST/LUNG: Clear to auscultation bilaterally; No rales, rhonchi, wheezing, or rubs  HEART: Regular rate and rhythm; No murmurs, rubs, or gallops  ABDOMEN: Soft, Nontender, Nondistended; Bowel sounds present  EXTREMITIES:  2+ Peripheral Pulses, No clubbing, cyanosis, or edema  LYMPH: No lymphadenopathy noted  SKIN: No rashes or lesions    LABS:                        11.8   9.17  )-----------( 191      ( 23 Dec 2023 06:28 )             36.6     23 Dec 2023 06:28    142    |  111    |  18     ----------------------------<  88     4.8     |  29     |  0.94     Ca    8.5        23 Dec 2023 06:28    TPro  5.6    /  Alb  2.7    /  TBili  1.0    /  DBili  x      /  AST  39     /  ALT  106    /  AlkPhos  76     22 Dec 2023 08:50      CAPILLARY BLOOD GLUCOSE      POCT Blood Glucose.: 82 mg/dL (23 Dec 2023 08:12)  POCT Blood Glucose.: 106 mg/dL (23 Dec 2023 05:29)  POCT Blood Glucose.: 269 mg/dL (22 Dec 2023 21:46)  POCT Blood Glucose.: 201 mg/dL (22 Dec 2023 17:19)  POCT Blood Glucose.: 132 mg/dL (22 Dec 2023 12:36)  POCT Blood Glucose.: 113 mg/dL (22 Dec 2023 08:58)    BLOOD CULTURE    RADIOLOGY & ADDITIONAL TESTS:    Imaging Personally Reviewed:  [ ] YES     Consultant(s) Notes Reviewed:      Care Discussed with Consultants/Other Providers:

## 2023-12-23 NOTE — PROGRESS NOTE ADULT - TIME BILLING
Note written by attending. Meds, labs, vitals, chart reviewed. Plan d/w patient. Went over test results and plan of care Note written by attending. Meds, labs, vitals, chart reviewed. Plan d/w patient. Went over test results and plan of care    Spoke to patient again. She wanted me to inform her Orthopedics Dr. Leonardo Munoz -[phone], that she is here Note written by attending. Meds, labs, vitals, chart reviewed. Plan d/w patient. Went over test results and plan of care    Spoke to patient again. She wanted me to inform her Orthopedics Dr. Leonardo Munoz -[phone], that she is here

## 2023-12-23 NOTE — PROGRESS NOTE ADULT - SUBJECTIVE AND OBJECTIVE BOX
[facility]  Nasim Maza PA-C  [address] [place], [place] [id number] [phone]    INTERVAL HPI/OVERNIGHT EVENTS:  Pt seen and examined  no Acute events overnight         MEDICATIONS  (STANDING):  carvedilol 12.5 milliGRAM(s) Oral every 12 hours  dextrose 5%. 1000 milliLiter(s) (50 mL/Hr) IV Continuous <Continuous>  dextrose 5%. 1000 milliLiter(s) (100 mL/Hr) IV Continuous <Continuous>  dextrose 50% Injectable 25 Gram(s) IV Push once  dextrose 50% Injectable 25 Gram(s) IV Push once  dextrose 50% Injectable 12.5 Gram(s) IV Push once  enoxaparin Injectable 40 milliGRAM(s) SubCutaneous every 12 hours  glucagon  Injectable 1 milliGRAM(s) IntraMuscular once  influenza  Vaccine (HIGH DOSE) 0.7 milliLiter(s) IntraMuscular once  insulin aspart (NovoLOG) corrective regimen sliding scale.   SubCutaneous three times a day before meals  insulin aspart Injectable (NovoLOG) 5 Unit(s) SubCutaneous three times a day before meals  irbesartan 150 milliGRAM(s) Oral <User Schedule>  Tresiba (insulin degludec) 100 units/mL 48 Unit(s) 48 Unit(s) SubCutaneous at bedtime    MEDICATIONS  (PRN):  acetaminophen     Tablet .. 650 milliGRAM(s) Oral every 6 hours PRN Temp greater or equal to 38C (100.4F), Mild Pain (1 - 3)  aluminum hydroxide/magnesium hydroxide/simethicone Suspension 30 milliLiter(s) Oral every 4 hours PRN Dyspepsia  dextrose Oral Gel 15 Gram(s) Oral once PRN Blood Glucose LESS THAN 70 milliGRAM(s)/deciliter  melatonin 3 milliGRAM(s) Oral at bedtime PRN Insomnia  ondansetron Injectable 4 milliGRAM(s) IV Push every 8 hours PRN Nausea and/or Vomiting      Allergies  No Known Allergies    Intolerances    ROS:   General:  No fevers, chills, night sweats, fatigue,   Eyes:  Good vision, no reported pain  ENT:  No sore throat, pain, runny nose, dysphagia  CV:  No pain, palpitations, hypo/hypertension  Resp:  No dyspnea, cough, tachypnea, wheezing  GI:  No pain, No nausea, No vomiting, No diarrhea, No constipation, No weight loss, No fever, No pruritis, No rectal bleeding, No tarry stools, No dysphagia,  :  No pain, bleeding, incontinence, nocturia  Muscle:  No pain, weakness  Neuro:  No weakness, tingling, memory problems  Psych:  No fatigue, insomnia, mood problems, depression  Endocrine:  No polyuria, polydipsia, cold/heat intolerance  Heme:  No petechiae, ecchymosis, easy bruisability  Skin:  No rash, tattoos, scars, edema      Vital Signs Last 24 Hrs  T(C): 37.1 (23 Dec 2023 05:09), Max: 37.1 (23 Dec 2023 05:09)  T(F): 98.7 (23 Dec 2023 05:09), Max: 98.7 (23 Dec 2023 05:09)  HR: 64 (23 Dec 2023 05:09) (64 - 65)  BP: 146/70 (23 Dec 2023 05:09) (142/70 - 149/62)  BP(mean): --  RR: 17 (23 Dec 2023 05:09) (17 - 18)  SpO2: 99% (23 Dec 2023 05:09) (94% - 99%)    Parameters below as of 23 Dec 2023 05:09  Patient On (Oxygen Delivery Method): room air        GENERAL:  Appears stated age  HEENT:  NC/AT  CHEST:  Full & symmetric excursion  HEART:  Regular rhythm  ABDOMEN:  Soft, non-tender, non-distended  EXTEREMITIES:  no cyanosis, clubbing or edema  SKIN:  No rash  NEURO:  Alert          LABS:                        11.8   9.17  )-----------( 191      ( 23 Dec 2023 06:28 )             36.6     12-23    142  |  111<H>  |  18  ----------------------------<  88  4.8   |  29  |  0.94    Ca    8.5      23 Dec 2023 06:28    TPro  5.6<L>  /  Alb  2.7<L>  /  TBili  1.0  /  DBili  x   /  AST  39<H>  /  ALT  106<H>  /  AlkPhos  76  12-22        RADIOLOGY:     ACC: [id number] EXAM:  US ABDOMEN RT UPR QUADRANT   ORDERED BY: FRANCISCO MAZA     PROCEDURE DATE:  12/22/2023          INTERPRETATION:  CLINICAL INFORMATION: Transaminitis    COMPARISON: None available.    TECHNIQUE: Sonography of the right upper quadrant.    FINDINGS:  Liver: Enlarged (18.1 cm) homogeneous  Bile ducts: Normal caliber. Common bile duct measures 6 mm.  Gallbladder: Within normal limits.  Pancreas: Visualized portions are within normal limits (partially   obscured by bowel).  Right kidney: 12.5 cm. No hydronephrosis.  Ascites: None.  IVC: Visualized portions are within normal limits.    IMPRESSION:  No gallbladder disease or biliary dilatation.  Mild hepatomegaly    --- End of Report ---                 [facility]  Nasim Maza PA-C  [address] [place], [place] [id number] [phone]    INTERVAL HPI/OVERNIGHT EVENTS:  Pt seen and examined  no Acute events overnight         MEDICATIONS  (STANDING):  carvedilol 12.5 milliGRAM(s) Oral every 12 hours  dextrose 5%. 1000 milliLiter(s) (50 mL/Hr) IV Continuous <Continuous>  dextrose 5%. 1000 milliLiter(s) (100 mL/Hr) IV Continuous <Continuous>  dextrose 50% Injectable 25 Gram(s) IV Push once  dextrose 50% Injectable 25 Gram(s) IV Push once  dextrose 50% Injectable 12.5 Gram(s) IV Push once  enoxaparin Injectable 40 milliGRAM(s) SubCutaneous every 12 hours  glucagon  Injectable 1 milliGRAM(s) IntraMuscular once  influenza  Vaccine (HIGH DOSE) 0.7 milliLiter(s) IntraMuscular once  insulin aspart (NovoLOG) corrective regimen sliding scale.   SubCutaneous three times a day before meals  insulin aspart Injectable (NovoLOG) 5 Unit(s) SubCutaneous three times a day before meals  irbesartan 150 milliGRAM(s) Oral <User Schedule>  Tresiba (insulin degludec) 100 units/mL 48 Unit(s) 48 Unit(s) SubCutaneous at bedtime    MEDICATIONS  (PRN):  acetaminophen     Tablet .. 650 milliGRAM(s) Oral every 6 hours PRN Temp greater or equal to 38C (100.4F), Mild Pain (1 - 3)  aluminum hydroxide/magnesium hydroxide/simethicone Suspension 30 milliLiter(s) Oral every 4 hours PRN Dyspepsia  dextrose Oral Gel 15 Gram(s) Oral once PRN Blood Glucose LESS THAN 70 milliGRAM(s)/deciliter  melatonin 3 milliGRAM(s) Oral at bedtime PRN Insomnia  ondansetron Injectable 4 milliGRAM(s) IV Push every 8 hours PRN Nausea and/or Vomiting      Allergies  No Known Allergies    Intolerances    ROS:   General:  No fevers, chills, night sweats, fatigue,   Eyes:  Good vision, no reported pain  ENT:  No sore throat, pain, runny nose, dysphagia  CV:  No pain, palpitations, hypo/hypertension  Resp:  No dyspnea, cough, tachypnea, wheezing  GI:  No pain, No nausea, No vomiting, No diarrhea, No constipation, No weight loss, No fever, No pruritis, No rectal bleeding, No tarry stools, No dysphagia,  :  No pain, bleeding, incontinence, nocturia  Muscle:  No pain, weakness  Neuro:  No weakness, tingling, memory problems  Psych:  No fatigue, insomnia, mood problems, depression  Endocrine:  No polyuria, polydipsia, cold/heat intolerance  Heme:  No petechiae, ecchymosis, easy bruisability  Skin:  No rash, tattoos, scars, edema      Vital Signs Last 24 Hrs  T(C): 37.1 (23 Dec 2023 05:09), Max: 37.1 (23 Dec 2023 05:09)  T(F): 98.7 (23 Dec 2023 05:09), Max: 98.7 (23 Dec 2023 05:09)  HR: 64 (23 Dec 2023 05:09) (64 - 65)  BP: 146/70 (23 Dec 2023 05:09) (142/70 - 149/62)  BP(mean): --  RR: 17 (23 Dec 2023 05:09) (17 - 18)  SpO2: 99% (23 Dec 2023 05:09) (94% - 99%)    Parameters below as of 23 Dec 2023 05:09  Patient On (Oxygen Delivery Method): room air        GENERAL:  Appears stated age  HEENT:  NC/AT  CHEST:  Full & symmetric excursion  HEART:  Regular rhythm  ABDOMEN:  Soft, non-tender, non-distended  EXTEREMITIES:  no cyanosis, clubbing or edema  SKIN:  No rash  NEURO:  Alert          LABS:                        11.8   9.17  )-----------( 191      ( 23 Dec 2023 06:28 )             36.6     12-23    142  |  111<H>  |  18  ----------------------------<  88  4.8   |  29  |  0.94    Ca    8.5      23 Dec 2023 06:28    TPro  5.6<L>  /  Alb  2.7<L>  /  TBili  1.0  /  DBili  x   /  AST  39<H>  /  ALT  106<H>  /  AlkPhos  76  12-22        RADIOLOGY:     ACC: [id number] EXAM:  US ABDOMEN RT UPR QUADRANT   ORDERED BY: FRANCISCO MAZA     PROCEDURE DATE:  12/22/2023          INTERPRETATION:  CLINICAL INFORMATION: Transaminitis    COMPARISON: None available.    TECHNIQUE: Sonography of the right upper quadrant.    FINDINGS:  Liver: Enlarged (18.1 cm) homogeneous  Bile ducts: Normal caliber. Common bile duct measures 6 mm.  Gallbladder: Within normal limits.  Pancreas: Visualized portions are within normal limits (partially   obscured by bowel).  Right kidney: 12.5 cm. No hydronephrosis.  Ascites: None.  IVC: Visualized portions are within normal limits.    IMPRESSION:  No gallbladder disease or biliary dilatation.  Mild hepatomegaly    --- End of Report ---

## 2023-12-23 NOTE — PROGRESS NOTE ADULT - SUBJECTIVE AND OBJECTIVE BOX
Date/Time Patient Seen:  		  Referring MD:   Data Reviewed	       Patient is a 73y old  Female who presents with a chief complaint of fall (22 Dec 2023 13:38)      Subjective/HPI     PAST MEDICAL & SURGICAL HISTORY:  SHANTE on CPAP    Obesity    Diabetes, type I    HTN (hypertension)    H/O lipoma  removal    History of angioplasty  PCI of RCA          Medication list         MEDICATIONS  (STANDING):  carvedilol 12.5 milliGRAM(s) Oral every 12 hours  dextrose 5%. 1000 milliLiter(s) (50 mL/Hr) IV Continuous <Continuous>  dextrose 5%. 1000 milliLiter(s) (100 mL/Hr) IV Continuous <Continuous>  dextrose 50% Injectable 25 Gram(s) IV Push once  dextrose 50% Injectable 25 Gram(s) IV Push once  dextrose 50% Injectable 12.5 Gram(s) IV Push once  enoxaparin Injectable 40 milliGRAM(s) SubCutaneous every 12 hours  glucagon  Injectable 1 milliGRAM(s) IntraMuscular once  influenza  Vaccine (HIGH DOSE) 0.7 milliLiter(s) IntraMuscular once  insulin aspart (NovoLOG) corrective regimen sliding scale.   SubCutaneous three times a day before meals  insulin aspart Injectable (NovoLOG) 5 Unit(s) SubCutaneous three times a day before meals  irbesartan 150 milliGRAM(s) Oral <User Schedule>  Tresiba (insulin degludec) 100 units/mL 48 Unit(s) 48 Unit(s) SubCutaneous at bedtime    MEDICATIONS  (PRN):  acetaminophen     Tablet .. 650 milliGRAM(s) Oral every 6 hours PRN Temp greater or equal to 38C (100.4F), Mild Pain (1 - 3)  aluminum hydroxide/magnesium hydroxide/simethicone Suspension 30 milliLiter(s) Oral every 4 hours PRN Dyspepsia  dextrose Oral Gel 15 Gram(s) Oral once PRN Blood Glucose LESS THAN 70 milliGRAM(s)/deciliter  melatonin 3 milliGRAM(s) Oral at bedtime PRN Insomnia  ondansetron Injectable 4 milliGRAM(s) IV Push every 8 hours PRN Nausea and/or Vomiting         Vitals log        ICU Vital Signs Last 24 Hrs  T(C): 36.8 (22 Dec 2023 19:26), Max: 36.8 (22 Dec 2023 19:26)  T(F): 98.2 (22 Dec 2023 19:26), Max: 98.2 (22 Dec 2023 19:26)  HR: 65 (22 Dec 2023 19:26) (65 - 69)  BP: 142/70 (22 Dec 2023 19:26) (142/70 - 150/61)  BP(mean): --  ABP: --  ABP(mean): --  RR: 18 (22 Dec 2023 19:26) (17 - 18)  SpO2: 94% (22 Dec 2023 19:26) (94% - 95%)    O2 Parameters below as of 22 Dec 2023 19:26  Patient On (Oxygen Delivery Method): nasal cannula                 Input and Output:  I&O's Detail    21 Dec 2023 07:01  -  22 Dec 2023 07:00  --------------------------------------------------------  IN:  Total IN: 0 mL    OUT:    Voided (mL): 300 mL  Total OUT: 300 mL    Total NET: -300 mL          Lab Data                        11.6   9.23  )-----------( 205      ( 22 Dec 2023 08:50 )             35.4     12-22    142  |  111<H>  |  12  ----------------------------<  98  3.6   |  27  |  0.67    Ca    8.4<L>      22 Dec 2023 08:50    TPro  5.6<L>  /  Alb  2.7<L>  /  TBili  1.0  /  DBili  x   /  AST  39<H>  /  ALT  106<H>  /  AlkPhos  76  12-22            Review of Systems	      Objective     Physical Examination  heart s1s2  lung dc BS  head nc        Pertinent Lab findings & Imaging      Shikha:  NO   Adequate UO     I&O's Detail    21 Dec 2023 07:01  -  22 Dec 2023 07:00  --------------------------------------------------------  IN:  Total IN: 0 mL    OUT:    Voided (mL): 300 mL  Total OUT: 300 mL    Total NET: -300 mL               Discussed with:     Cultures:	        Radiology

## 2023-12-23 NOTE — SOCIAL WORK PROGRESS NOTE - NSSWPROGRESSNOTE_GEN_ALL_CORE
Sw has followed up with subacute rehab today. [facility] has no female beds. Contacted [facility] and admissions is closed till Tuesday. Message sent to Judge via all [employer] answering their questions. Attempted to reach Rupesh Lopez [phone] message was left this afternoon to check status of referral and bed availability. Sw will continue to remain available for safe dc plan. Sw to follow-up on Sunday with Liaison. Pt was seen at bedside and updated today.   Sw has followed up with subacute rehab today. [facility] has no female beds. Contacted [facility] and admissions is closed till Tuesday. Message sent to Judge via [employer] answering their questions. Attempted to reach Rupesh Lopez [phone] message was left this afternoon to check status of referral and bed availability. Sw will continue to remain available for safe dc plan. Sw to follow-up on Sunday with Liaison. Pt was seen at bedside and updated today.

## 2023-12-24 LAB
ALBUMIN SERPL ELPH-MCNC: 2.5 G/DL — LOW (ref 3.3–5)
ALBUMIN SERPL ELPH-MCNC: 2.5 G/DL — LOW (ref 3.3–5)
ALP SERPL-CCNC: 61 U/L — SIGNIFICANT CHANGE UP (ref 40–120)
ALP SERPL-CCNC: 61 U/L — SIGNIFICANT CHANGE UP (ref 40–120)
ALT FLD-CCNC: 58 U/L — SIGNIFICANT CHANGE UP (ref 12–78)
ALT FLD-CCNC: 58 U/L — SIGNIFICANT CHANGE UP (ref 12–78)
ANION GAP SERPL CALC-SCNC: 4 MMOL/L — LOW (ref 5–17)
ANION GAP SERPL CALC-SCNC: 4 MMOL/L — LOW (ref 5–17)
AST SERPL-CCNC: 20 U/L — SIGNIFICANT CHANGE UP (ref 15–37)
AST SERPL-CCNC: 20 U/L — SIGNIFICANT CHANGE UP (ref 15–37)
BILIRUB SERPL-MCNC: 0.5 MG/DL — SIGNIFICANT CHANGE UP (ref 0.2–1.2)
BILIRUB SERPL-MCNC: 0.5 MG/DL — SIGNIFICANT CHANGE UP (ref 0.2–1.2)
BUN SERPL-MCNC: 22 MG/DL — SIGNIFICANT CHANGE UP (ref 7–23)
BUN SERPL-MCNC: 22 MG/DL — SIGNIFICANT CHANGE UP (ref 7–23)
CALCIUM SERPL-MCNC: 8.2 MG/DL — LOW (ref 8.5–10.1)
CALCIUM SERPL-MCNC: 8.2 MG/DL — LOW (ref 8.5–10.1)
CHLORIDE SERPL-SCNC: 110 MMOL/L — HIGH (ref 96–108)
CHLORIDE SERPL-SCNC: 110 MMOL/L — HIGH (ref 96–108)
CO2 SERPL-SCNC: 28 MMOL/L — SIGNIFICANT CHANGE UP (ref 22–31)
CO2 SERPL-SCNC: 28 MMOL/L — SIGNIFICANT CHANGE UP (ref 22–31)
CREAT SERPL-MCNC: 0.88 MG/DL — SIGNIFICANT CHANGE UP (ref 0.5–1.3)
CREAT SERPL-MCNC: 0.88 MG/DL — SIGNIFICANT CHANGE UP (ref 0.5–1.3)
EGFR: 69 ML/MIN/1.73M2 — SIGNIFICANT CHANGE UP
EGFR: 69 ML/MIN/1.73M2 — SIGNIFICANT CHANGE UP
GLUCOSE SERPL-MCNC: 138 MG/DL — HIGH (ref 70–99)
GLUCOSE SERPL-MCNC: 138 MG/DL — HIGH (ref 70–99)
HCT VFR BLD CALC: 34 % — LOW (ref 34.5–45)
HCT VFR BLD CALC: 34 % — LOW (ref 34.5–45)
HGB BLD-MCNC: 10.9 G/DL — LOW (ref 11.5–15.5)
HGB BLD-MCNC: 10.9 G/DL — LOW (ref 11.5–15.5)
MCHC RBC-ENTMCNC: 28.2 PG — SIGNIFICANT CHANGE UP (ref 27–34)
MCHC RBC-ENTMCNC: 28.2 PG — SIGNIFICANT CHANGE UP (ref 27–34)
MCHC RBC-ENTMCNC: 32.1 GM/DL — SIGNIFICANT CHANGE UP (ref 32–36)
MCHC RBC-ENTMCNC: 32.1 GM/DL — SIGNIFICANT CHANGE UP (ref 32–36)
MCV RBC AUTO: 87.9 FL — SIGNIFICANT CHANGE UP (ref 80–100)
MCV RBC AUTO: 87.9 FL — SIGNIFICANT CHANGE UP (ref 80–100)
NRBC # BLD: 0 /100 WBCS — SIGNIFICANT CHANGE UP (ref 0–0)
NRBC # BLD: 0 /100 WBCS — SIGNIFICANT CHANGE UP (ref 0–0)
PLATELET # BLD AUTO: 178 K/UL — SIGNIFICANT CHANGE UP (ref 150–400)
PLATELET # BLD AUTO: 178 K/UL — SIGNIFICANT CHANGE UP (ref 150–400)
POTASSIUM SERPL-MCNC: 3.9 MMOL/L — SIGNIFICANT CHANGE UP (ref 3.5–5.3)
POTASSIUM SERPL-MCNC: 3.9 MMOL/L — SIGNIFICANT CHANGE UP (ref 3.5–5.3)
POTASSIUM SERPL-SCNC: 3.9 MMOL/L — SIGNIFICANT CHANGE UP (ref 3.5–5.3)
POTASSIUM SERPL-SCNC: 3.9 MMOL/L — SIGNIFICANT CHANGE UP (ref 3.5–5.3)
PROT SERPL-MCNC: 5.5 G/DL — LOW (ref 6–8.3)
PROT SERPL-MCNC: 5.5 G/DL — LOW (ref 6–8.3)
RBC # BLD: 3.87 M/UL — SIGNIFICANT CHANGE UP (ref 3.8–5.2)
RBC # BLD: 3.87 M/UL — SIGNIFICANT CHANGE UP (ref 3.8–5.2)
RBC # FLD: 13.5 % — SIGNIFICANT CHANGE UP (ref 10.3–14.5)
RBC # FLD: 13.5 % — SIGNIFICANT CHANGE UP (ref 10.3–14.5)
SARS-COV-2 RNA SPEC QL NAA+PROBE: SIGNIFICANT CHANGE UP
SARS-COV-2 RNA SPEC QL NAA+PROBE: SIGNIFICANT CHANGE UP
SODIUM SERPL-SCNC: 142 MMOL/L — SIGNIFICANT CHANGE UP (ref 135–145)
SODIUM SERPL-SCNC: 142 MMOL/L — SIGNIFICANT CHANGE UP (ref 135–145)
WBC # BLD: 6.96 K/UL — SIGNIFICANT CHANGE UP (ref 3.8–10.5)
WBC # BLD: 6.96 K/UL — SIGNIFICANT CHANGE UP (ref 3.8–10.5)
WBC # FLD AUTO: 6.96 K/UL — SIGNIFICANT CHANGE UP (ref 3.8–10.5)
WBC # FLD AUTO: 6.96 K/UL — SIGNIFICANT CHANGE UP (ref 3.8–10.5)

## 2023-12-24 PROCEDURE — 99232 SBSQ HOSP IP/OBS MODERATE 35: CPT

## 2023-12-24 RX ORDER — INSULIN ASPART 100 [IU]/ML
3 INJECTION, SOLUTION SUBCUTANEOUS
Refills: 0 | Status: DISCONTINUED | OUTPATIENT
Start: 2023-12-24 | End: 2023-12-25

## 2023-12-24 RX ADMIN — INSULIN ASPART 3 UNIT(S): 100 INJECTION, SOLUTION SUBCUTANEOUS at 12:38

## 2023-12-24 RX ADMIN — Medication 650 MILLIGRAM(S): at 19:07

## 2023-12-24 RX ADMIN — IRBESARTAN 150 MILLIGRAM(S): 75 TABLET ORAL at 12:38

## 2023-12-24 RX ADMIN — Medication 650 MILLIGRAM(S): at 18:10

## 2023-12-24 RX ADMIN — CARVEDILOL PHOSPHATE 12.5 MILLIGRAM(S): 80 CAPSULE, EXTENDED RELEASE ORAL at 17:26

## 2023-12-24 RX ADMIN — INSULIN ASPART 5 UNIT(S): 100 INJECTION, SOLUTION SUBCUTANEOUS at 08:26

## 2023-12-24 RX ADMIN — Medication 650 MILLIGRAM(S): at 05:31

## 2023-12-24 RX ADMIN — INSULIN ASPART 3 UNIT(S): 100 INJECTION, SOLUTION SUBCUTANEOUS at 17:23

## 2023-12-24 RX ADMIN — ENOXAPARIN SODIUM 40 MILLIGRAM(S): 100 INJECTION SUBCUTANEOUS at 05:31

## 2023-12-24 RX ADMIN — ENOXAPARIN SODIUM 40 MILLIGRAM(S): 100 INJECTION SUBCUTANEOUS at 17:25

## 2023-12-24 RX ADMIN — Medication 650 MILLIGRAM(S): at 06:30

## 2023-12-24 RX ADMIN — CARVEDILOL PHOSPHATE 12.5 MILLIGRAM(S): 80 CAPSULE, EXTENDED RELEASE ORAL at 05:31

## 2023-12-24 NOTE — PROGRESS NOTE ADULT - SUBJECTIVE AND OBJECTIVE BOX
[facility]  Nasim Maza PA-C  [address] [place], [place] [id number] [phone]    INTERVAL HPI/OVERNIGHT EVENTS:  Pt seen and examined  Tolerating diet   No acute events overnight     MEDICATIONS  (STANDING):  carvedilol 12.5 milliGRAM(s) Oral every 12 hours  dextrose 5%. 1000 milliLiter(s) (50 mL/Hr) IV Continuous <Continuous>  dextrose 5%. 1000 milliLiter(s) (100 mL/Hr) IV Continuous <Continuous>  dextrose 50% Injectable 25 Gram(s) IV Push once  dextrose 50% Injectable 25 Gram(s) IV Push once  dextrose 50% Injectable 12.5 Gram(s) IV Push once  enoxaparin Injectable 40 milliGRAM(s) SubCutaneous every 12 hours  glucagon  Injectable 1 milliGRAM(s) IntraMuscular once  influenza  Vaccine (HIGH DOSE) 0.7 milliLiter(s) IntraMuscular once  insulin aspart (NovoLOG) corrective regimen sliding scale.   SubCutaneous three times a day before meals  insulin aspart Injectable (NovoLOG) 3 Unit(s) SubCutaneous three times a day before meals  irbesartan 150 milliGRAM(s) Oral <User Schedule>  Tresiba (Insulin Degludec) 100 units/ml 44 Unit(s) 44 Unit(s) SubCutaneous at bedtime    MEDICATIONS  (PRN):  acetaminophen     Tablet .. 650 milliGRAM(s) Oral every 6 hours PRN Temp greater or equal to 38C (100.4F), Mild Pain (1 - 3)  aluminum hydroxide/magnesium hydroxide/simethicone Suspension 30 milliLiter(s) Oral every 4 hours PRN Dyspepsia  dextrose Oral Gel 15 Gram(s) Oral once PRN Blood Glucose LESS THAN 70 milliGRAM(s)/deciliter  melatonin 3 milliGRAM(s) Oral at bedtime PRN Insomnia  ondansetron Injectable 4 milliGRAM(s) IV Push every 8 hours PRN Nausea and/or Vomiting      Allergies  No Known Allergies    Intolerances      Vital Signs Last 24 Hrs  T(C): 36.3 (24 Dec 2023 05:28), Max: 36.8 (23 Dec 2023 11:53)  T(F): 97.3 (24 Dec 2023 05:28), Max: 98.3 (23 Dec 2023 11:53)  HR: 62 (24 Dec 2023 05:28) (62 - 73)  BP: 113/61 (24 Dec 2023 05:28) (113/61 - 155/68)  BP(mean): --  RR: 16 (24 Dec 2023 05:28) (16 - 18)  SpO2: 93% (24 Dec 2023 05:28) (92% - 94%)    Parameters below as of 24 Dec 2023 05:28  Patient On (Oxygen Delivery Method): room air          GENERAL:  Appears stated age  HEENT:  NC/AT  CHEST:  Full & symmetric excursion  HEART:  Regular rhythm  ABDOMEN:  Soft, non-tender, non-distended  EXTEREMITIES:  no cyanosis, clubbing or edema  SKIN:  No rash  NEURO:  Alert        LABS:                        10.9   6.96  )-----------( 178      ( 24 Dec 2023 07:25 )             34.0     12-24    142  |  110<H>  |  22  ----------------------------<  138<H>  3.9   |  28  |  0.88    Ca    8.2<L>      24 Dec 2023 07:25    TPro  5.5<L>  /  Alb  2.5<L>  /  TBili  0.5  /  DBili  x   /  AST  20  /  ALT  58  /  AlkPhos  61  12-24          RADIOLOGY:     ACC: [id number] EXAM:  US ABDOMEN RT UPR QUADRANT   ORDERED BY: FRANCISCO MAZA     PROCEDURE DATE:  12/22/2023          INTERPRETATION:  CLINICAL INFORMATION: Transaminitis    COMPARISON: None available.    TECHNIQUE: Sonography of the right upper quadrant.    FINDINGS:  Liver: Enlarged (18.1 cm) homogeneous  Bile ducts: Normal caliber. Common bile duct measures 6 mm.  Gallbladder: Within normal limits.  Pancreas: Visualized portions are within normal limits (partially   obscured by bowel).  Right kidney: 12.5 cm. No hydronephrosis.  Ascites: None.  IVC: Visualized portions are within normal limits.    IMPRESSION:  No gallbladder disease or biliary dilatation.  Mild hepatomegaly    --- End of Report ---                 [facility]  Nasim Maza PA-C  [address] [place], [place] [id number] [phone]    INTERVAL HPI/OVERNIGHT EVENTS:  Pt seen and examined  Tolerating diet   No acute events overnight     MEDICATIONS  (STANDING):  carvedilol 12.5 milliGRAM(s) Oral every 12 hours  dextrose 5%. 1000 milliLiter(s) (50 mL/Hr) IV Continuous <Continuous>  dextrose 5%. 1000 milliLiter(s) (100 mL/Hr) IV Continuous <Continuous>  dextrose 50% Injectable 25 Gram(s) IV Push once  dextrose 50% Injectable 25 Gram(s) IV Push once  dextrose 50% Injectable 12.5 Gram(s) IV Push once  enoxaparin Injectable 40 milliGRAM(s) SubCutaneous every 12 hours  glucagon  Injectable 1 milliGRAM(s) IntraMuscular once  influenza  Vaccine (HIGH DOSE) 0.7 milliLiter(s) IntraMuscular once  insulin aspart (NovoLOG) corrective regimen sliding scale.   SubCutaneous three times a day before meals  insulin aspart Injectable (NovoLOG) 3 Unit(s) SubCutaneous three times a day before meals  irbesartan 150 milliGRAM(s) Oral <User Schedule>  Tresiba (Insulin Degludec) 100 units/ml 44 Unit(s) 44 Unit(s) SubCutaneous at bedtime    MEDICATIONS  (PRN):  acetaminophen     Tablet .. 650 milliGRAM(s) Oral every 6 hours PRN Temp greater or equal to 38C (100.4F), Mild Pain (1 - 3)  aluminum hydroxide/magnesium hydroxide/simethicone Suspension 30 milliLiter(s) Oral every 4 hours PRN Dyspepsia  dextrose Oral Gel 15 Gram(s) Oral once PRN Blood Glucose LESS THAN 70 milliGRAM(s)/deciliter  melatonin 3 milliGRAM(s) Oral at bedtime PRN Insomnia  ondansetron Injectable 4 milliGRAM(s) IV Push every 8 hours PRN Nausea and/or Vomiting      Allergies  No Known Allergies    Intolerances      Vital Signs Last 24 Hrs  T(C): 36.3 (24 Dec 2023 05:28), Max: 36.8 (23 Dec 2023 11:53)  T(F): 97.3 (24 Dec 2023 05:28), Max: 98.3 (23 Dec 2023 11:53)  HR: 62 (24 Dec 2023 05:28) (62 - 73)  BP: 113/61 (24 Dec 2023 05:28) (113/61 - 155/68)  BP(mean): --  RR: 16 (24 Dec 2023 05:28) (16 - 18)  SpO2: 93% (24 Dec 2023 05:28) (92% - 94%)    Parameters below as of 24 Dec 2023 05:28  Patient On (Oxygen Delivery Method): room air          GENERAL:  Appears stated age  HEENT:  NC/AT  CHEST:  Full & symmetric excursion  HEART:  Regular rhythm  ABDOMEN:  Soft, non-tender, non-distended  EXTEREMITIES:  no cyanosis, clubbing or edema  SKIN:  No rash  NEURO:  Alert        LABS:                        10.9   6.96  )-----------( 178      ( 24 Dec 2023 07:25 )             34.0     12-24    142  |  110<H>  |  22  ----------------------------<  138<H>  3.9   |  28  |  0.88    Ca    8.2<L>      24 Dec 2023 07:25    TPro  5.5<L>  /  Alb  2.5<L>  /  TBili  0.5  /  DBili  x   /  AST  20  /  ALT  58  /  AlkPhos  61  12-24          RADIOLOGY:     ACC: [id number] EXAM:  US ABDOMEN RT UPR QUADRANT   ORDERED BY: FRANCISCO MAZA     PROCEDURE DATE:  12/22/2023          INTERPRETATION:  CLINICAL INFORMATION: Transaminitis    COMPARISON: None available.    TECHNIQUE: Sonography of the right upper quadrant.    FINDINGS:  Liver: Enlarged (18.1 cm) homogeneous  Bile ducts: Normal caliber. Common bile duct measures 6 mm.  Gallbladder: Within normal limits.  Pancreas: Visualized portions are within normal limits (partially   obscured by bowel).  Right kidney: 12.5 cm. No hydronephrosis.  Ascites: None.  IVC: Visualized portions are within normal limits.    IMPRESSION:  No gallbladder disease or biliary dilatation.  Mild hepatomegaly    --- End of Report ---

## 2023-12-24 NOTE — PROGRESS NOTE ADULT - TIME BILLING
Note written by attending. Meds, labs, vitals, chart reviewed. Plan d/w patient. Went over test results and plan of care    Spoke to patient again. She wanted me to inform her Orthopedics Dr. Leonardo Munoz -[phone], that she is here. Left message with service Note written by attending. Meds, labs, vitals, chart reviewed. Plan d/w patient. Went over test results and plan of care    Spoke to patient again. She wanted me to inform her Orthopedics Dr. Leonardo Munoz -[phone], that she is here. Left message with service

## 2023-12-24 NOTE — PROGRESS NOTE ADULT - SUBJECTIVE AND OBJECTIVE BOX
called at 6;30 PM by the nurse that patient noticed more swelling of LLE without pain , paresthesia or added discomfort. vital signs remains stable with palpable distal pulses note by covering nurse.    patient is currently here for pattellar fracture and other associated medical conditions.    patient was seen around 7 PM, in bed in NAD, states she is feeling "good" , denies any pain,  paresthesia or cold limb, , patient states that she feels her LLE around the "knee cap" region  feels slightly swollen but denies any pain or stiffness.   physical exam;      T(F): 97.7 (12-24-23 @ 18:44), Max: 98.3 (12-24-23 @ 14:24)  HR: 78 (12-24-23 @ 18:44) (62 - 78)  BP: 130/55 (12-24-23 @ 18:44) (113/61 - 141/56)  RR: 16 (12-24-23 @ 18:44) (16 - 17)  SpO2: 93% (12-24-23 @ 18:44) (93% - 94%)  Wt(kg): --  CAPILLARY BLOOD GLUCOSE      POCT Blood Glucose.: 178 mg/dL (24 Dec 2023 21:39)  POCT Blood Glucose.: 74 mg/dL (24 Dec 2023 17:15)  POCT Blood Glucose.: 77 mg/dL (24 Dec 2023 12:22)  POCT Blood Glucose.: 136 mg/dL (24 Dec 2023 08:18)  POCT Blood Glucose.: 121 mg/dL (24 Dec 2023 06:16)  POCT Blood Glucose.: 93 mg/dL (24 Dec 2023 05:57)  POCT Blood Glucose.: 68 mg/dL (24 Dec 2023 05:35)      PHYSICAL EXAM:    knee immobilized was removed and the surrounding tissue was examined , palpable distal pulses felt, LE remains warm with full sensation distally, no active bleeding noted , some residual ecchymosis was noted , compartments soft t o touch, no pain was illicite during the exam. due to soiled immobilizer , the  nurse at the bedside was asked to obtain a new one and will reapply.      LABS:                        10.9   6.96  )-----------( 178      ( 24 Dec 2023 07:25 )             34.0     12-24    142  |  110<H>  |  22  ----------------------------<  138<H>  3.9   |  28  |  0.88    Ca    8.2<L>      24 Dec 2023 07:25    TPro  5.5<L>  /  Alb  2.5<L>  /  TBili  0.5  /  DBili  x   /  AST  20  /  ALT  58  /  AlkPhos  61  12-24      I&O's Detail    24 Dec 2023 07:01  -  25 Dec 2023 00:00  --------------------------------------------------------  IN:  Total IN: 0 mL    OUT:    Voided (mL): 1200 mL  Total OUT: 1200 mL    Total NET: -1200 mL          Impression: 73y Female admitted with Unspecified fracture of unspecified patella, initial encounter for closed fracture      PMH SHANTE on CPAP    Obesity    Diabetes, type I    HTN (hypertension)        Plan:  -will keep immobilizer   reassess  if any changes

## 2023-12-24 NOTE — PROVIDER CONTACT NOTE (HYPOGLYCEMIA EVENT) - NS PROVIDER CONTACT RECOMMEND-HYPO
Pt requested lorrie rey to bring blood sugar up. Blood sugar increased from 68 to 93 and then to 121. Pt reports feeling much better.

## 2023-12-24 NOTE — PROGRESS NOTE ADULT - ASSESSMENT
73 YOA female patient with PMHx for T1DM, HTN, SHANTE on CPAP, presenting today to the ED for a fall, admitted for placement.       Problem/Plan - 1:  ·  Problem: Patella fracture.   ·  Plan: - s/p mechanical fall  - XR shows 5 cm distracted mid body patella transverse fracture.  Fracture fragments inferior to the distal patella fracture segment.  - CT scans shows Acute comminuted distracted transverse fracture of the patella at its midportion as above.  - elevated leukocyte count, possibly reactive to trauma. Counts  improved   - Pain regimen in place  - Fall risk protocol  - knee immobilizer in place  - WBAT  - Ice and elevate  - Ortho consulted, recs appreciated. No surgical intervention indicated   - to follow up outpatient ortho one week after discharge  - PT Recommended [facility]. patient wants to go to [facility] or . SW consulted      Problem/Plan - 2:  ·  Problem: Lymphedema.   ·  Plan: - reports history of edema in lower extremities  - not on any home medications for condition  - pitting edema present on physical examination  - Denies shortness of breath   - US duplex bilateral lower extremities negative for DVT     Problem/Plan - 3:  ·  Problem: SHANTE on CPAP.   ·  Plan: Chronic, stable  - On CPAP tonight, order in place.  -Pulm Dr. Mensah consulted      Problem/Plan - 4:  ·  Problem: Diabetes, type I.   ·  Plan: Chronic, stable  -  A1c 9.3  - Patient wanted to resume home regimen. D/w patient that blood sugar are running low, will need to adjust the dose. She agreed with decreasing Tresiba to 44 Units and Pre meal Novolog to 3 units for now. D/w Pharmacist   Endocrine following      Problem/Plan - 5:  ·  Problem: HTN (hypertension).   ·  Plan: Chronic, stable  - c/w home carvedilol  - c/w home avapro, patient prefers home med      Problem/Plan - 6:  ·  Problem: Need for prophylactic measure.   ·  Plan: DVT PPX: Lovenox sub q BID ordered    Problem/Plan- 6: Abnormal LFt's  -Asymptomatic   -US + for fatty liver  -GI following     DISPO: Full code.     73 YOA female patient with PMHx for T1DM, HTN, SHANTE on CPAP, presenting today to the ED for a fall, admitted for placement.       Problem/Plan - 1:  ·  Problem: Patella fracture.   ·  Plan: - s/p mechanical fall  - XR shows 5 cm distracted mid body patella transverse fracture.  Fracture fragments inferior to the distal patella fracture segment.  - CT scans shows Acute comminuted distracted transverse fracture of the patella at its midportion as above.  - elevated leukocyte count, possibly reactive to trauma. Counts  improved   - Pain regimen in place  - Fall risk protocol  - knee immobilizer in place  - WBAT  - Ice and elevate  - Ortho consulted, recs appreciated. No surgical intervention indicated   - to follow up outpatient ortho one week after discharge  - PT Recommended ADELITA. patient wants to go to [facility] or . SW consulted      Problem/Plan - 2:  ·  Problem: Lymphedema.   ·  Plan: - reports history of edema in lower extremities  - not on any home medications for condition  - pitting edema present on physical examination  - Denies shortness of breath   - US duplex bilateral lower extremities negative for DVT     Problem/Plan - 3:  ·  Problem: SHANTE on CPAP.   ·  Plan: Chronic, stable  - On CPAP tonight, order in place.  -Pulm Dr. Mensah consulted      Problem/Plan - 4:  ·  Problem: Diabetes, type I.   ·  Plan: Chronic, stable  -  A1c 9.3  - Patient wanted to resume home regimen. D/w patient that blood sugar are running low, will need to adjust the dose. She agreed with decreasing Tresiba to 44 Units and Pre meal Novolog to 3 units for now. D/w Pharmacist   Endocrine following      Problem/Plan - 5:  ·  Problem: HTN (hypertension).   ·  Plan: Chronic, stable  - c/w home carvedilol  - c/w home avapro, patient prefers home med      Problem/Plan - 6:  ·  Problem: Need for prophylactic measure.   ·  Plan: DVT PPX: Lovenox sub q BID ordered    Problem/Plan- 6: Abnormal LFt's  -Asymptomatic   -US + for fatty liver  -GI following     DISPO: Full code.

## 2023-12-24 NOTE — PROVIDER CONTACT NOTE (HYPOGLYCEMIA EVENT) - NS PROVIDER CONTACT BACKGROUND-HYPO
Age: 73y    Gender: Female    POCT Blood Glucose:  121 mg/dL (12-24-23 @ 06:16)  93 mg/dL (12-24-23 @ 05:57)  68 mg/dL (12-24-23 @ 05:35)  129 mg/dL (12-23-23 @ 21:45)  109 mg/dL (12-23-23 @ 17:09)  116 mg/dL (12-23-23 @ 12:25)  82 mg/dL (12-23-23 @ 08:12)      eMAR:  insulin aspart Injectable (NovoLOG)   5 Unit(s) SubCutaneous (12-23-23 @ 17:36)   5 Unit(s) SubCutaneous (12-23-23 @ 12:41)   5 Unit(s) SubCutaneous (12-23-23 @ 08:40)

## 2023-12-24 NOTE — PROGRESS NOTE ADULT - SUBJECTIVE AND OBJECTIVE BOX
Patient is a 73y old  Female who presents with a chief complaint of fall (24 Dec 2023 06:10)       INTERVAL HPI/OVERNIGHT EVENTS: Patient seen and examined at bedside. Denies any new symptoms, complaints. Denies chest pain, palpitation, sob     MEDICATIONS  (STANDING):  carvedilol 12.5 milliGRAM(s) Oral every 12 hours  dextrose 5%. 1000 milliLiter(s) (50 mL/Hr) IV Continuous <Continuous>  dextrose 5%. 1000 milliLiter(s) (100 mL/Hr) IV Continuous <Continuous>  dextrose 50% Injectable 25 Gram(s) IV Push once  dextrose 50% Injectable 25 Gram(s) IV Push once  dextrose 50% Injectable 12.5 Gram(s) IV Push once  enoxaparin Injectable 40 milliGRAM(s) SubCutaneous every 12 hours  glucagon  Injectable 1 milliGRAM(s) IntraMuscular once  influenza  Vaccine (HIGH DOSE) 0.7 milliLiter(s) IntraMuscular once  insulin aspart (NovoLOG) corrective regimen sliding scale.   SubCutaneous three times a day before meals  insulin aspart Injectable (NovoLOG) 5 Unit(s) SubCutaneous three times a day before meals  irbesartan 150 milliGRAM(s) Oral <User Schedule>  Tresiba (insulin degludec) 100 units/mL 48 Unit(s) 48 Unit(s) SubCutaneous at bedtime    MEDICATIONS  (PRN):  acetaminophen     Tablet .. 650 milliGRAM(s) Oral every 6 hours PRN Temp greater or equal to 38C (100.4F), Mild Pain (1 - 3)  aluminum hydroxide/magnesium hydroxide/simethicone Suspension 30 milliLiter(s) Oral every 4 hours PRN Dyspepsia  dextrose Oral Gel 15 Gram(s) Oral once PRN Blood Glucose LESS THAN 70 milliGRAM(s)/deciliter  melatonin 3 milliGRAM(s) Oral at bedtime PRN Insomnia  ondansetron Injectable 4 milliGRAM(s) IV Push every 8 hours PRN Nausea and/or Vomiting      Allergies    No Known Allergies    Intolerances        REVIEW OF SYSTEMS:  CONSTITUTIONAL: No fever, weight loss, or fatigue  EYES: No eye pain, visual disturbances, or discharge  ENMT:  No difficulty hearing, tinnitus, vertigo; No sinus or throat pain  NECK: No pain or stiffness  BREASTS: No pain, masses, or nipple discharge  RESPIRATORY: No cough, wheezing, chills or hemoptysis; No shortness of breath  CARDIOVASCULAR: No chest pain, palpitations, dizziness, or leg swelling  GASTROINTESTINAL: No abdominal or epigastric pain. No nausea, vomiting, or hematemesis; No diarrhea or constipation.   GENITOURINARY: No dysuria, frequency, hematuria, or incontinence  NEUROLOGICAL: No headaches, memory loss, loss of strength, numbness, or tremors  SKIN: No itching, burning, rashes, or lesions     Vital Signs Last 24 Hrs  T(C): 36.3 (24 Dec 2023 05:28), Max: 36.8 (23 Dec 2023 11:53)  T(F): 97.3 (24 Dec 2023 05:28), Max: 98.3 (23 Dec 2023 11:53)  HR: 62 (24 Dec 2023 05:28) (62 - 73)  BP: 113/61 (24 Dec 2023 05:28) (113/61 - 155/68)  BP(mean): --  RR: 16 (24 Dec 2023 05:28) (16 - 18)  SpO2: 93% (24 Dec 2023 05:28) (92% - 94%)    Parameters below as of 24 Dec 2023 05:28  Patient On (Oxygen Delivery Method): room air        PHYSICAL EXAM:  GENERAL: NAD, well-groomed, well-developed  HEAD:  Atraumatic, Normocephalic  EYES: EOMI, PERRLA, conjunctiva and sclera clear  ENMT: No tonsillar erythema, exudates, or enlargement; Moist mucous membranes  NECK: Supple, No JVD, Normal thyroid  NERVOUS SYSTEM:  Alert & Oriented X3, Good concentration  CHEST/LUNG: Clear to auscultation bilaterally; No rales, rhonchi, wheezing, or rubs  HEART: Regular rate and rhythm; No murmurs, rubs, or gallops  ABDOMEN: Soft, Nontender, Nondistended; Bowel sounds present  EXTREMITIES:  2+ Peripheral Pulses, No clubbing, cyanosis, or edema  LYMPH: No lymphadenopathy noted  SKIN: No rashes or lesions    LABS:      Ca    8.5        23 Dec 2023 06:28        CAPILLARY BLOOD GLUCOSE      POCT Blood Glucose.: 136 mg/dL (24 Dec 2023 08:18)  POCT Blood Glucose.: 121 mg/dL (24 Dec 2023 06:16)  POCT Blood Glucose.: 93 mg/dL (24 Dec 2023 05:57)  POCT Blood Glucose.: 68 mg/dL (24 Dec 2023 05:35)  POCT Blood Glucose.: 129 mg/dL (23 Dec 2023 21:45)  POCT Blood Glucose.: 109 mg/dL (23 Dec 2023 17:09)  POCT Blood Glucose.: 116 mg/dL (23 Dec 2023 12:25)    BLOOD CULTURE    RADIOLOGY & ADDITIONAL TESTS:    Imaging Personally Reviewed:  [ ] YES     Consultant(s) Notes Reviewed:      Care Discussed with Consultants/Other Providers:

## 2023-12-24 NOTE — PROGRESS NOTE ADULT - ASSESSMENT
73 YOA female patient with PMHx for T1DM, HTN, SHANTE on CPAP, presenting today to the ED for a fall.    fall  SHANTE  HTN  DM    wean fio2  enc use of CPAP  poss dc plan for [facility]    UA abd noted  GI eval noted  ENDO eval noted  CPAP night time  with Humidification  SHANTE - Sleep hygiene   monitor VS and HD and Sat  fall prec  DM care  serial FS

## 2023-12-24 NOTE — SOCIAL WORK PROGRESS NOTE - NSSWPROGRESSNOTE_GEN_ALL_CORE
Pt. medically accepted to [facility], confirmed she will use their CPAP machine and updated covid test. SW requested covid test and left a voicemail for intake [phone] for CPAP bed availability, pt. informed.  Pt. medically accepted to [facility], confirmed she will use their CPAP machine and updated covid test. SW requested covid test and left a voicemail for intake [phone] for CPAP bed availability, pt. informed.

## 2023-12-24 NOTE — SOCIAL WORK PROGRESS NOTE - NSSWPROGRESSNOTE_GEN_ALL_CORE
Sw spoke with pt. to inform her that [facility] did not call back regarding admission today, pt. expressed understanding.

## 2023-12-24 NOTE — PROGRESS NOTE ADULT - SUBJECTIVE AND OBJECTIVE BOX
CAPILLARY BLOOD GLUCOSE      POCT Blood Glucose.: 136 mg/dL (24 Dec 2023 08:18)  POCT Blood Glucose.: 121 mg/dL (24 Dec 2023 06:16)  POCT Blood Glucose.: 93 mg/dL (24 Dec 2023 05:57)  POCT Blood Glucose.: 68 mg/dL (24 Dec 2023 05:35)  POCT Blood Glucose.: 129 mg/dL (23 Dec 2023 21:45)  POCT Blood Glucose.: 109 mg/dL (23 Dec 2023 17:09)  POCT Blood Glucose.: 116 mg/dL (23 Dec 2023 12:25)      Vital Signs Last 24 Hrs  T(C): 36.3 (24 Dec 2023 05:28), Max: 36.8 (23 Dec 2023 21:14)  T(F): 97.3 (24 Dec 2023 05:28), Max: 98.3 (23 Dec 2023 21:14)  HR: 62 (24 Dec 2023 05:28) (62 - 73)  BP: 113/61 (24 Dec 2023 05:28) (113/61 - 155/68)  BP(mean): --  RR: 16 (24 Dec 2023 05:28) (16 - 18)  SpO2: 93% (24 Dec 2023 05:28) (93% - 94%)    Parameters below as of 24 Dec 2023 05:28  Patient On (Oxygen Delivery Method): room air        General: WN/WD NAD  Respiratory: CTA B/L  CV: RRR, S1S2, no murmurs, rubs or gallops  Abdominal: Soft, NT, ND +BS, Last BM  Extremities: No edema, + peripheral pulses     12-24    142  |  110<H>  |  22  ----------------------------<  138<H>  3.9   |  28  |  0.88    Ca    8.2<L>      24 Dec 2023 07:25    TPro  5.5<L>  /  Alb  2.5<L>  /  TBili  0.5  /  DBili  x   /  AST  20  /  ALT  58  /  AlkPhos  61  12-24      dextrose 50% Injectable 25 Gram(s) IV Push once  dextrose 50% Injectable 25 Gram(s) IV Push once  dextrose 50% Injectable 12.5 Gram(s) IV Push once  dextrose Oral Gel 15 Gram(s) Oral once PRN  glucagon  Injectable 1 milliGRAM(s) IntraMuscular once  insulin aspart (NovoLOG) corrective regimen sliding scale.   SubCutaneous three times a day before meals  insulin aspart Injectable (NovoLOG) 3 Unit(s) SubCutaneous three times a day before meals

## 2023-12-24 NOTE — PROGRESS NOTE ADULT - SUBJECTIVE AND OBJECTIVE BOX
Date/Time Patient Seen:  		  Referring MD:   Data Reviewed	       Patient is a 73y old  Female who presents with a chief complaint of fall (23 Dec 2023 09:52)      Subjective/HPI     PAST MEDICAL & SURGICAL HISTORY:  SHANTE on CPAP    Obesity    Diabetes, type I    HTN (hypertension)    H/O lipoma  removal    History of angioplasty  PCI of RCA          Medication list         MEDICATIONS  (STANDING):  carvedilol 12.5 milliGRAM(s) Oral every 12 hours  dextrose 5%. 1000 milliLiter(s) (50 mL/Hr) IV Continuous <Continuous>  dextrose 5%. 1000 milliLiter(s) (100 mL/Hr) IV Continuous <Continuous>  dextrose 50% Injectable 25 Gram(s) IV Push once  dextrose 50% Injectable 25 Gram(s) IV Push once  dextrose 50% Injectable 12.5 Gram(s) IV Push once  enoxaparin Injectable 40 milliGRAM(s) SubCutaneous every 12 hours  glucagon  Injectable 1 milliGRAM(s) IntraMuscular once  influenza  Vaccine (HIGH DOSE) 0.7 milliLiter(s) IntraMuscular once  insulin aspart (NovoLOG) corrective regimen sliding scale.   SubCutaneous three times a day before meals  insulin aspart Injectable (NovoLOG) 5 Unit(s) SubCutaneous three times a day before meals  irbesartan 150 milliGRAM(s) Oral <User Schedule>  Tresiba (insulin degludec) 100 units/mL 48 Unit(s) 48 Unit(s) SubCutaneous at bedtime    MEDICATIONS  (PRN):  acetaminophen     Tablet .. 650 milliGRAM(s) Oral every 6 hours PRN Temp greater or equal to 38C (100.4F), Mild Pain (1 - 3)  aluminum hydroxide/magnesium hydroxide/simethicone Suspension 30 milliLiter(s) Oral every 4 hours PRN Dyspepsia  dextrose Oral Gel 15 Gram(s) Oral once PRN Blood Glucose LESS THAN 70 milliGRAM(s)/deciliter  melatonin 3 milliGRAM(s) Oral at bedtime PRN Insomnia  ondansetron Injectable 4 milliGRAM(s) IV Push every 8 hours PRN Nausea and/or Vomiting         Vitals log        ICU Vital Signs Last 24 Hrs  T(C): 36.3 (24 Dec 2023 05:28), Max: 36.8 (23 Dec 2023 11:53)  T(F): 97.3 (24 Dec 2023 05:28), Max: 98.3 (23 Dec 2023 11:53)  HR: 62 (24 Dec 2023 05:28) (62 - 73)  BP: 113/61 (24 Dec 2023 05:28) (113/61 - 155/68)  BP(mean): --  ABP: --  ABP(mean): --  RR: 16 (24 Dec 2023 05:28) (16 - 18)  SpO2: 93% (24 Dec 2023 05:28) (92% - 94%)    O2 Parameters below as of 24 Dec 2023 05:28  Patient On (Oxygen Delivery Method): room air                 Input and Output:  I&O's Detail      Lab Data                        11.8   9.17  )-----------( 191      ( 23 Dec 2023 06:28 )             36.6     12-23    142  |  111<H>  |  18  ----------------------------<  88  4.8   |  29  |  0.94    Ca    8.5      23 Dec 2023 06:28    TPro  5.6<L>  /  Alb  2.7<L>  /  TBili  1.0  /  DBili  x   /  AST  39<H>  /  ALT  106<H>  /  AlkPhos  76  12-22            Review of Systems	      Objective     Physical Examination    heart s1s2  lung dc BS  head nc      Pertinent Lab findings & Imaging      Shikha:  NO   Adequate UO     I&O's Detail           Discussed with:     Cultures:	        Radiology

## 2023-12-25 ENCOUNTER — TRANSCRIPTION ENCOUNTER (OUTPATIENT)
Age: 73
End: 2023-12-25

## 2023-12-25 VITALS
OXYGEN SATURATION: 97 % | TEMPERATURE: 98 F | SYSTOLIC BLOOD PRESSURE: 134 MMHG | DIASTOLIC BLOOD PRESSURE: 74 MMHG | RESPIRATION RATE: 18 BRPM | HEART RATE: 87 BPM

## 2023-12-25 LAB
ANION GAP SERPL CALC-SCNC: 4 MMOL/L — LOW (ref 5–17)
ANION GAP SERPL CALC-SCNC: 4 MMOL/L — LOW (ref 5–17)
BUN SERPL-MCNC: 21 MG/DL — SIGNIFICANT CHANGE UP (ref 7–23)
BUN SERPL-MCNC: 21 MG/DL — SIGNIFICANT CHANGE UP (ref 7–23)
CALCIUM SERPL-MCNC: 8.4 MG/DL — LOW (ref 8.5–10.1)
CALCIUM SERPL-MCNC: 8.4 MG/DL — LOW (ref 8.5–10.1)
CHLORIDE SERPL-SCNC: 110 MMOL/L — HIGH (ref 96–108)
CHLORIDE SERPL-SCNC: 110 MMOL/L — HIGH (ref 96–108)
CO2 SERPL-SCNC: 27 MMOL/L — SIGNIFICANT CHANGE UP (ref 22–31)
CO2 SERPL-SCNC: 27 MMOL/L — SIGNIFICANT CHANGE UP (ref 22–31)
CREAT SERPL-MCNC: 0.78 MG/DL — SIGNIFICANT CHANGE UP (ref 0.5–1.3)
CREAT SERPL-MCNC: 0.78 MG/DL — SIGNIFICANT CHANGE UP (ref 0.5–1.3)
EGFR: 80 ML/MIN/1.73M2 — SIGNIFICANT CHANGE UP
EGFR: 80 ML/MIN/1.73M2 — SIGNIFICANT CHANGE UP
GLUCOSE SERPL-MCNC: 62 MG/DL — LOW (ref 70–99)
GLUCOSE SERPL-MCNC: 62 MG/DL — LOW (ref 70–99)
HCT VFR BLD CALC: 33.7 % — LOW (ref 34.5–45)
HCT VFR BLD CALC: 33.7 % — LOW (ref 34.5–45)
HGB BLD-MCNC: 10.9 G/DL — LOW (ref 11.5–15.5)
HGB BLD-MCNC: 10.9 G/DL — LOW (ref 11.5–15.5)
MCHC RBC-ENTMCNC: 28.2 PG — SIGNIFICANT CHANGE UP (ref 27–34)
MCHC RBC-ENTMCNC: 28.2 PG — SIGNIFICANT CHANGE UP (ref 27–34)
MCHC RBC-ENTMCNC: 32.3 GM/DL — SIGNIFICANT CHANGE UP (ref 32–36)
MCHC RBC-ENTMCNC: 32.3 GM/DL — SIGNIFICANT CHANGE UP (ref 32–36)
MCV RBC AUTO: 87.1 FL — SIGNIFICANT CHANGE UP (ref 80–100)
MCV RBC AUTO: 87.1 FL — SIGNIFICANT CHANGE UP (ref 80–100)
NRBC # BLD: 0 /100 WBCS — SIGNIFICANT CHANGE UP (ref 0–0)
NRBC # BLD: 0 /100 WBCS — SIGNIFICANT CHANGE UP (ref 0–0)
PLATELET # BLD AUTO: 215 K/UL — SIGNIFICANT CHANGE UP (ref 150–400)
PLATELET # BLD AUTO: 215 K/UL — SIGNIFICANT CHANGE UP (ref 150–400)
POTASSIUM SERPL-MCNC: 4.2 MMOL/L — SIGNIFICANT CHANGE UP (ref 3.5–5.3)
POTASSIUM SERPL-MCNC: 4.2 MMOL/L — SIGNIFICANT CHANGE UP (ref 3.5–5.3)
POTASSIUM SERPL-SCNC: 4.2 MMOL/L — SIGNIFICANT CHANGE UP (ref 3.5–5.3)
POTASSIUM SERPL-SCNC: 4.2 MMOL/L — SIGNIFICANT CHANGE UP (ref 3.5–5.3)
RBC # BLD: 3.87 M/UL — SIGNIFICANT CHANGE UP (ref 3.8–5.2)
RBC # BLD: 3.87 M/UL — SIGNIFICANT CHANGE UP (ref 3.8–5.2)
RBC # FLD: 13.4 % — SIGNIFICANT CHANGE UP (ref 10.3–14.5)
RBC # FLD: 13.4 % — SIGNIFICANT CHANGE UP (ref 10.3–14.5)
SODIUM SERPL-SCNC: 141 MMOL/L — SIGNIFICANT CHANGE UP (ref 135–145)
SODIUM SERPL-SCNC: 141 MMOL/L — SIGNIFICANT CHANGE UP (ref 135–145)
WBC # BLD: 7.27 K/UL — SIGNIFICANT CHANGE UP (ref 3.8–10.5)
WBC # BLD: 7.27 K/UL — SIGNIFICANT CHANGE UP (ref 3.8–10.5)
WBC # FLD AUTO: 7.27 K/UL — SIGNIFICANT CHANGE UP (ref 3.8–10.5)
WBC # FLD AUTO: 7.27 K/UL — SIGNIFICANT CHANGE UP (ref 3.8–10.5)

## 2023-12-25 PROCEDURE — 73564 X-RAY EXAM KNEE 4 OR MORE: CPT

## 2023-12-25 PROCEDURE — 99285 EMERGENCY DEPT VISIT HI MDM: CPT | Mod: 25

## 2023-12-25 PROCEDURE — 80074 ACUTE HEPATITIS PANEL: CPT

## 2023-12-25 PROCEDURE — 85027 COMPLETE CBC AUTOMATED: CPT

## 2023-12-25 PROCEDURE — 99239 HOSP IP/OBS DSCHRG MGMT >30: CPT

## 2023-12-25 PROCEDURE — 93306 TTE W/DOPPLER COMPLETE: CPT | Mod: 26

## 2023-12-25 PROCEDURE — 76705 ECHO EXAM OF ABDOMEN: CPT

## 2023-12-25 PROCEDURE — 93005 ELECTROCARDIOGRAM TRACING: CPT

## 2023-12-25 PROCEDURE — 76376 3D RENDER W/INTRP POSTPROCES: CPT

## 2023-12-25 PROCEDURE — 80053 COMPREHEN METABOLIC PANEL: CPT

## 2023-12-25 PROCEDURE — 83036 HEMOGLOBIN GLYCOSYLATED A1C: CPT

## 2023-12-25 PROCEDURE — 82962 GLUCOSE BLOOD TEST: CPT

## 2023-12-25 PROCEDURE — 97116 GAIT TRAINING THERAPY: CPT

## 2023-12-25 PROCEDURE — 87635 SARS-COV-2 COVID-19 AMP PRB: CPT

## 2023-12-25 PROCEDURE — 93970 EXTREMITY STUDY: CPT

## 2023-12-25 PROCEDURE — 36415 COLL VENOUS BLD VENIPUNCTURE: CPT

## 2023-12-25 PROCEDURE — 73700 CT LOWER EXTREMITY W/O DYE: CPT | Mod: MA

## 2023-12-25 PROCEDURE — 94660 CPAP INITIATION&MGMT: CPT

## 2023-12-25 PROCEDURE — 80048 BASIC METABOLIC PNL TOTAL CA: CPT

## 2023-12-25 PROCEDURE — 97530 THERAPEUTIC ACTIVITIES: CPT

## 2023-12-25 PROCEDURE — 93306 TTE W/DOPPLER COMPLETE: CPT

## 2023-12-25 PROCEDURE — 97110 THERAPEUTIC EXERCISES: CPT

## 2023-12-25 PROCEDURE — 97161 PT EVAL LOW COMPLEX 20 MIN: CPT

## 2023-12-25 PROCEDURE — 85025 COMPLETE CBC W/AUTO DIFF WBC: CPT

## 2023-12-25 PROCEDURE — 86803 HEPATITIS C AB TEST: CPT

## 2023-12-25 RX ORDER — DEXTROSE 50 % IN WATER 50 %
12.5 SYRINGE (ML) INTRAVENOUS ONCE
Refills: 0 | Status: COMPLETED | OUTPATIENT
Start: 2023-12-25 | End: 2023-12-25

## 2023-12-25 RX ORDER — ACETAMINOPHEN 500 MG
2 TABLET ORAL
Qty: 0 | Refills: 0 | DISCHARGE
Start: 2023-12-25

## 2023-12-25 RX ORDER — ENOXAPARIN SODIUM 100 MG/ML
40 INJECTION SUBCUTANEOUS
Qty: 0 | Refills: 0 | DISCHARGE
Start: 2023-12-25

## 2023-12-25 RX ADMIN — ENOXAPARIN SODIUM 40 MILLIGRAM(S): 100 INJECTION SUBCUTANEOUS at 05:22

## 2023-12-25 RX ADMIN — IRBESARTAN 150 MILLIGRAM(S): 75 TABLET ORAL at 12:45

## 2023-12-25 RX ADMIN — Medication 650 MILLIGRAM(S): at 01:39

## 2023-12-25 RX ADMIN — CARVEDILOL PHOSPHATE 12.5 MILLIGRAM(S): 80 CAPSULE, EXTENDED RELEASE ORAL at 05:22

## 2023-12-25 NOTE — PROGRESS NOTE ADULT - ASSESSMENT
73 YOA female patient with PMHx for T1DM, HTN, SHANTE on CPAP, presenting today to the ED for a fall, admitted for placement.       Problem/Plan - 1:  ·  Problem: Patella fracture.   ·  Plan: - s/p mechanical fall  - XR showed 5 cm distracted mid body patella transverse fracture.  Fracture fragments inferior to the distal patella fracture segment.  - CT scans shows Acute comminuted distracted transverse fracture of the patella at its midportion as above.  - Reactive leukocytosis. Improved   - Pain regimen in place  - Fall risk protocol  - knee immobilizer in place. Ortho f/u since patient states that its bothering her may be too tight   - WBAT  - Ice and elevate  - Ortho consulted, recs appreciated. No surgical intervention indicated   - to follow up outpatient ortho one week after discharge  - PT Recommended ADELITA. patient wants to go to [facility] or . SW consulted      Problem/Plan - 2:  ·  Problem: Lymphedema. Chronic   ·  Plan: - reports history of edema in lower extremities  - not on any home medications for condition  - Denies shortness of breath   - US duplex bilateral lower extremities negative for DVT     Problem/Plan - 3:  ·  Problem: SHANTE on CPAP.   ·  Plan: Chronic, stable  - On CPAP tonight, order in place.  -Pulm Dr. Mensah consulted      Problem/Plan - 4:  ·  Problem: Diabetes, type I.   ·  Plan: Chronic, stable  -  A1c 9.3  - Patient wanted to resume home regimen. D/w patient that blood sugar are running low, will need to adjust the dose. Blood sugar still on low side. Will d/c Pre meal Novolog and decrease dose of Tresiba . Will adjust meds as needed. Patient agreeable. D/w Pharmacist   Endocrine following      Problem/Plan - 5:  ·  Problem: HTN (hypertension).   ·  Plan: Chronic, stable  - c/w home carvedilol  - c/w home avapro, patient prefers home med      Problem/Plan - 6:  ·  Problem: Need for prophylactic measure.   ·  Plan: DVT PPX: Lovenox sub q BID ordered    Problem/Plan- 6: Abnormal LFt's  -Asymptomatic   -US + for fatty liver  -GI following     DISPO: Full code.     73 YOA female patient with PMHx for T1DM, HTN, SHANTE on CPAP, presenting today to the ED for a fall, admitted for placement.       Problem/Plan - 1:  ·  Problem: Patella fracture.   ·  Plan: - s/p mechanical fall  - XR showed 5 cm distracted mid body patella transverse fracture.  Fracture fragments inferior to the distal patella fracture segment.  - CT scans shows Acute comminuted distracted transverse fracture of the patella at its midportion as above.  - Reactive leukocytosis. Improved   - Pain regimen in place  - Fall risk protocol  - knee immobilizer in place. Ortho f/u since patient states that its bothering her may be too tight   - WBAT  - Ice and elevate  - Ortho consulted, recs appreciated. No surgical intervention indicated   - to follow up outpatient ortho one week after discharge  - PT Recommended ADELITA. patient wants to go to [facility] or . SW consulted      Problem/Plan - 2:  ·  Problem: Lymphedema. Chronic   ·  Plan: - reports history of edema in lower extremities  - not on any home medications for condition  - Denies shortness of breath   - US duplex bilateral lower extremities negative for DVT     Problem/Plan - 3:  ·  Problem: SHANTE on CPAP.   ·  Plan: Chronic, stable  - On CPAP tonight, order in place.  -Pulm Dr. Mensah consulted      Problem/Plan - 4:  ·  Problem: Diabetes, type I.   ·  Plan: Chronic, stable  -  A1c 9.3  - Patient wanted to resume home regimen. D/w patient that blood sugar are running low, will need to adjust the dose. Blood sugar still on low side. Will d/c Pre meal Novolog and decrease dose of Tresiba . Will adjust meds as needed. Patient agreeable. D/w Pharmacist   Endocrine following      Problem/Plan - 5:  ·  Problem: HTN (hypertension).   ·  Plan: Chronic, stable  - c/w home carvedilol  - c/w home avapro, patient prefers home med      Problem/Plan - 6:  ·  Problem: Need for prophylactic measure.   ·  Plan: DVT PPX: Lovenox sub q BID ordered    Problem/Plan- 6: Abnormal LFt's  -Asymptomatic   -US + for fatty liver  -GI following     DISPO: Full code.     72 Y/o  female patient with PMHx for T1DM, HTN, SHANTE on CPAP, presenting today to the ED for a fall, admitted for placement.       Problem/Plan - 1:  ·  Problem: Patella fracture.   ·  Plan: - s/p mechanical fall  - XR showed 5 cm distracted mid body patella transverse fracture.  Fracture fragments inferior to the distal patella fracture segment.  - CT scans shows Acute comminuted distracted transverse fracture of the patella at its midportion as above.  - Reactive leukocytosis. Improved   - Pain regimen in place  - Fall risk protocol  - knee immobilizer in place. Ortho f/u since patient states that its bothering her may be too tight   - WBAT  - Ice and elevate  - Ortho consulted, recs appreciated. No surgical intervention indicated   - to follow up outpatient ortho one week after discharge  - PT Recommended ADELITA. patient wants to go to [facility] or . SW consulted      Problem/Plan - 2:  ·  Problem: Lymphedema. Chronic   ·  Plan: - reports history of edema in lower extremities  - not on any home medications for condition  - Denies shortness of breath   - US duplex bilateral lower extremities negative for DVT     Problem/Plan - 3:  ·  Problem: SHANTE on CPAP.   ·  Plan: Chronic, stable  - On CPAP tonight, order in place.  Patient has been refusing   -Pulm Dr. Mensah consulted      Problem/Plan - 4:  ·  Problem: Diabetes, type I.   ·  Plan: Chronic, stable  -  A1c 9.3  - Patient wanted to resume home regimen. D/w patient that blood sugar are running low, will need to adjust the dose. Blood sugar still on low side. Will d/c Pre meal Novolog and decrease dose of Tresiba . Will adjust meds as needed. Patient agreeable. D/w Pharmacist   Endocrine following      Problem/Plan - 5:  ·  Problem: HTN (hypertension).   ·  Plan: Chronic, stable  - c/w home carvedilol  - c/w home avapro, patient prefers home med      Problem/Plan - 6:  ·  Problem: Need for prophylactic measure.   ·  Plan: DVT PPX: Lovenox sub q BID ordered    Problem/Plan- 6: Abnormal LFt's  -Asymptomatic   -US + for fatty liver  -GI following     DISPO: Full code.     72 Y/o  female patient with PMHx for T1DM, HTN, SHANTE on CPAP, presenting today to the ED for a fall, admitted for placement.       Problem/Plan - 1:  ·  Problem: Patella fracture.   ·  Plan: - s/p mechanical fall  - XR showed 5 cm distracted mid body patella transverse fracture.  Fracture fragments inferior to the distal patella fracture segment.  - CT scans shows Acute comminuted distracted transverse fracture of the patella at its midportion as above.  - Reactive leukocytosis. Improved   - Pain regimen in place  - Fall risk protocol  - knee immobilizer in place. Ortho f/u since patient states that its bothering her may be too tight   - WBAT  - Ice and elevate  - Ortho consulted, recs appreciated. No surgical intervention indicated   - to follow up outpatient ortho one week after discharge  - PT Recommended ADELITA. patient wants to go to [facility] or . SW consulted      Problem/Plan - 2:  ·  Problem: Lymphedema. Chronic   ·  Plan: - reports history of edema in lower extremities  - not on any home medications for condition  - Denies shortness of breath   - US duplex bilateral lower extremities negative for DVT     Problem/Plan - 3:  ·  Problem: SHANTE on CPAP.   ·  Plan: Chronic, stable  - On CPAP tonight, order in place.  Patient has been refusing   -Pulm Dr. Mensah consulted      Problem/Plan - 4:  ·  Problem: Diabetes, type I.   ·  Plan: Chronic, stable  -  A1c 9.3  - Patient wanted to resume home regimen. D/w patient that blood sugar are running low, will need to adjust the dose. Blood sugar still on low side. Will d/c Pre meal Novolog and decrease dose of Tresiba . Will adjust meds as needed. Patient agreeable. D/w Pharmacist   Endocrine following      Problem/Plan - 5:  ·  Problem: HTN (hypertension).   ·  Plan: Chronic, stable  - c/w home carvedilol  - c/w home avapro, patient prefers home med      Problem/Plan - 6:  ·  Problem: Need for prophylactic measure.   ·  Plan: DVT PPX: Lovenox sub q BID ordered    Problem/Plan- 6: Abnormal LFt's  -Asymptomatic   -US + for fatty liver  -GI following     DISPO: Full code.     74 Y/o  female patient with PMHx for T1DM, HTN, SHANTE on CPAP, presented  to the ED for a fall, admitted for placement.       Problem/Plan - 1:  ·  Problem: Patella fracture.   ·  Plan: - s/p mechanical fall  - XR showed 5 cm distracted mid body patella transverse fracture.  Fracture fragments inferior to the distal patella fracture segment.  - CT scans shows Acute comminuted distracted transverse fracture of the patella at its midportion as above.  - Reactive leukocytosis. Improved   - Pain regimen in place  - Fall risk protocol  - knee immobilizer in place. Ortho f/u since patient states that its bothering her may be too tight   - WBAT  - Ice and elevate  - Ortho consulted, recs appreciated. No surgical intervention indicated   - to follow up outpatient ortho one week after discharge  - PT Recommended ADELITA. patient wants to go to [facility] or . SW consulted      Problem/Plan - 2:  ·  Problem: Lymphedema. Chronic   ·  Plan: - reports history of edema in lower extremities  - not on any home medications for condition  - Denies shortness of breath   - US duplex bilateral lower extremities negative for DVT     Problem/Plan - 3:  ·  Problem: SHANTE on CPAP.   ·  Plan: Chronic, stable  - On CPAP tonight, order in place.  Patient has been refusing   -Pulm Dr. Mensah consulted      Problem/Plan - 4:  ·  Problem: Diabetes, type I.   ·  Plan: Chronic, stable  -  A1c 9.3  - Patient wanted to resume home regimen. D/w patient that blood sugar are running low, will need to adjust the dose. Blood sugar still on low side. Will d/c Pre meal Novolog and decrease dose of Tresiba . Will adjust meds as needed. Patient agreeable. D/w Pharmacist   Endocrine following      Problem/Plan - 5:  ·  Problem: HTN (hypertension).   ·  Plan: Chronic, stable  - c/w home carvedilol  - c/w home avapro, patient prefers home med      Problem/Plan - 6:  ·  Problem: Need for prophylactic measure.   ·  Plan: DVT PPX: Lovenox sub q BID ordered    Problem/Plan- 6: Abnormal LFt's  -Asymptomatic   -US + for fatty liver  -GI following     DISPO: Full code.     74 Y/o  female patient with PMHx for T1DM, HTN, SHANTE on CPAP, presented  to the ED for a fall, admitted for placement.       Problem/Plan - 1:  ·  Problem: Patella fracture.   ·  Plan: - s/p mechanical fall  - XR showed 5 cm distracted mid body patella transverse fracture.  Fracture fragments inferior to the distal patella fracture segment.  - CT scans shows Acute comminuted distracted transverse fracture of the patella at its midportion as above.  - Reactive leukocytosis. Improved   - Pain regimen in place  - Fall risk protocol  - knee immobilizer in place. Ortho f/u since patient states that its bothering her may be too tight   - WBAT  - Ice and elevate  - Ortho consulted, recs appreciated. No surgical intervention indicated   - to follow up outpatient ortho one week after discharge  - PT Recommended ADELITA. patient wants to go to [facility] or . SW consulted      Problem/Plan - 2:  ·  Problem: Lymphedema. Chronic   ·  Plan: - reports history of edema in lower extremities  - not on any home medications for condition  - Denies shortness of breath   - US duplex bilateral lower extremities negative for DVT     Problem/Plan - 3:  ·  Problem: SHANTE on CPAP.   ·  Plan: Chronic, stable  - On CPAP tonight, order in place.  Patient has been refusing   -Pulm Dr. Mensah consulted      Problem/Plan - 4:  ·  Problem: Diabetes, type I.   ·  Plan: Chronic, stable  -  A1c 9.3  - Patient wanted to resume home regimen. D/w patient that blood sugar are running low, will need to adjust the dose. Blood sugar still on low side. Will d/c Pre meal Novolog and decrease dose of Tresiba . Will adjust meds as needed. Patient agreeable. D/w Pharmacist   Endocrine following      Problem/Plan - 5:  ·  Problem: HTN (hypertension).   ·  Plan: Chronic, stable  - c/w home carvedilol  - c/w home avapro, patient prefers home med      Problem/Plan - 6:  ·  Problem: Need for prophylactic measure.   ·  Plan: DVT PPX: Lovenox sub q BID ordered    Problem/Plan- 6: Abnormal LFt's  -Asymptomatic   -US + for fatty liver  -GI following     DISPO: Full code.

## 2023-12-25 NOTE — DISCHARGE NOTE NURSING/CASE MANAGEMENT/SOCIAL WORK - NSDCPEFALRISK_GEN_ALL_CORE
For information on Fall & Injury Prevention, visit: [email or web address] OR  [email or web address] OR  https://www.cdc.gov/steadi/patient.html For information on Fall & Injury Prevention, visit: [email or web address] OR  [email or web address] OR  https://www.cdc.gov/steadi/patient.html

## 2023-12-25 NOTE — DISCHARGE NOTE PROVIDER - CARE PROVIDER_API CALL
Jose Guadalupe Villagomez  Orthopaedic Surgery  [address], [address]  [place] [address]  Phone: [phone]  Fax: [phone]  Follow Up Time:    Jose Guadalupe Villagomez  Orthopaedic Surgery  [address], [address]  [place] [address]  Phone: [phone]  Fax: [phone]  Follow Up Time:

## 2023-12-25 NOTE — PROGRESS NOTE ADULT - SUBJECTIVE AND OBJECTIVE BOX
Patient is a 73y old  Female who presents with a chief complaint of fall (25 Dec 2023 05:48)      INTERVAL HPI/OVERNIGHT EVENTS: Patient seen and examined at bedside. Denies chest pain, sob but her knee brace is irritating the skin. No dizziness, weakness     MEDICATIONS  (STANDING):  carvedilol 12.5 milliGRAM(s) Oral every 12 hours  dextrose 5%. 1000 milliLiter(s) (100 mL/Hr) IV Continuous <Continuous>  dextrose 5%. 1000 milliLiter(s) (50 mL/Hr) IV Continuous <Continuous>  dextrose 50% Injectable 25 Gram(s) IV Push once  dextrose 50% Injectable 25 Gram(s) IV Push once  dextrose 50% Injectable 12.5 Gram(s) IV Push once  dextrose 50% Injectable 12.5 Gram(s) IV Push once  enoxaparin Injectable 40 milliGRAM(s) SubCutaneous every 12 hours  glucagon  Injectable 1 milliGRAM(s) IntraMuscular once  influenza  Vaccine (HIGH DOSE) 0.7 milliLiter(s) IntraMuscular once  insulin aspart (NovoLOG) corrective regimen sliding scale.   SubCutaneous three times a day before meals  irbesartan 150 milliGRAM(s) Oral <User Schedule>  Tresiba (Insulin Degludec) 100 units/ml 44 Unit(s) 44 Unit(s) SubCutaneous at bedtime    MEDICATIONS  (PRN):  acetaminophen     Tablet .. 650 milliGRAM(s) Oral every 6 hours PRN Temp greater or equal to 38C (100.4F), Mild Pain (1 - 3)  aluminum hydroxide/magnesium hydroxide/simethicone Suspension 30 milliLiter(s) Oral every 4 hours PRN Dyspepsia  dextrose Oral Gel 15 Gram(s) Oral once PRN Blood Glucose LESS THAN 70 milliGRAM(s)/deciliter  melatonin 3 milliGRAM(s) Oral at bedtime PRN Insomnia  ondansetron Injectable 4 milliGRAM(s) IV Push every 8 hours PRN Nausea and/or Vomiting      Allergies    No Known Allergies    Intolerances        REVIEW OF SYSTEMS:  CONSTITUTIONAL: No fever, weight loss, or fatigue  EYES: No eye pain, visual disturbances, or discharge  ENMT:  No difficulty hearing, tinnitus, vertigo; No sinus or throat pain  NECK: No pain or stiffness  BREASTS: No pain, masses, or nipple discharge  RESPIRATORY: No cough, wheezing, chills or hemoptysis; No shortness of breath  CARDIOVASCULAR: No chest pain, palpitations, dizziness, or leg swelling  GASTROINTESTINAL: No abdominal or epigastric pain. No nausea, vomiting, or hematemesis; No diarrhea or constipation.   GENITOURINARY: No dysuria, frequency, hematuria, or incontinence  NEUROLOGICAL: No headaches, memory loss, loss of strength, numbness, or tremors  SKIN: No itching, burning, rashes, or lesions   Vital Signs Last 24 Hrs  T(C): 36.8 (25 Dec 2023 05:52), Max: 36.8 (24 Dec 2023 14:24)  T(F): 98.3 (25 Dec 2023 05:52), Max: 98.3 (24 Dec 2023 14:24)  HR: 95 (25 Dec 2023 05:52) (74 - 95)  BP: 161/65 (25 Dec 2023 05:52) (130/55 - 161/65)  BP(mean): --  RR: 17 (25 Dec 2023 05:52) (16 - 17)  SpO2: 94% (25 Dec 2023 05:52) (93% - 94%)    Parameters below as of 25 Dec 2023 05:52  Patient On (Oxygen Delivery Method): room air        PHYSICAL EXAM:  GENERAL: NAD, well-groomed, well-developed  HEAD:  Atraumatic, Normocephalic  EYES: EOMI, PERRLA, conjunctiva and sclera clear  ENMT: No tonsillar erythema, exudates, or enlargement; Moist mucous membranes  NECK: Supple, No JVD, Normal thyroid  NERVOUS SYSTEM:  Alert & Oriented X3, Good concentration  CHEST/LUNG: Clear to auscultation bilaterally; No rales, rhonchi, wheezing, or rubs  HEART: Regular rate and rhythm; No murmurs, rubs, or gallops  ABDOMEN: Soft, Nontender, Nondistended; Bowel sounds present  EXTREMITIES:  2+ Peripheral Pulses, No clubbing, cyanosis, or edema  LYMPH: No lymphadenopathy noted  SKIN: No rashes or lesions    LABS:      Ca    8.2        24 Dec 2023 07:25        CAPILLARY BLOOD GLUCOSE      POCT Blood Glucose.: 64 mg/dL (25 Dec 2023 08:41)  POCT Blood Glucose.: 178 mg/dL (24 Dec 2023 21:39)  POCT Blood Glucose.: 74 mg/dL (24 Dec 2023 17:15)  POCT Blood Glucose.: 77 mg/dL (24 Dec 2023 12:22)    BLOOD CULTURE    RADIOLOGY & ADDITIONAL TESTS:    Imaging Personally Reviewed:  [ ] YES     Consultant(s) Notes Reviewed:      Care Discussed with Consultants/Other Providers:

## 2023-12-25 NOTE — DISCHARGE NOTE PROVIDER - NSDCCPCAREPLAN_GEN_ALL_CORE_FT
PRINCIPAL DISCHARGE DIAGNOSIS  Diagnosis: Patellar sleeve fracture of left knee  Assessment and Plan of Treatment: Please follow up with Orthopedics  within 1 week  meds per the lsit.   Adjust Insulin regimen based upon blood sugar. Patient prefers her home meds  Complaince with CPAP is encouraged   f/u with all your doctors

## 2023-12-25 NOTE — DISCHARGE NOTE NURSING/CASE MANAGEMENT/SOCIAL WORK - SOCIAL WORKER'S NAME
Baudilio Matamoros [facility] [phone] Baudilio Matamoros [facility] [phone]

## 2023-12-25 NOTE — PROGRESS NOTE ADULT - SUBJECTIVE AND OBJECTIVE BOX
Date/Time Patient Seen:  		  Referring MD:   Data Reviewed	       Patient is a 73y old  Female who presents with a chief complaint of fall (24 Dec 2023 23:59)      Subjective/HPI     PAST MEDICAL & SURGICAL HISTORY:  SHANTE on CPAP    Obesity    Diabetes, type I    HTN (hypertension)    H/O lipoma  removal    History of angioplasty  PCI of RCA          Medication list         MEDICATIONS  (STANDING):  carvedilol 12.5 milliGRAM(s) Oral every 12 hours  dextrose 5%. 1000 milliLiter(s) (50 mL/Hr) IV Continuous <Continuous>  dextrose 5%. 1000 milliLiter(s) (100 mL/Hr) IV Continuous <Continuous>  dextrose 50% Injectable 25 Gram(s) IV Push once  dextrose 50% Injectable 25 Gram(s) IV Push once  dextrose 50% Injectable 12.5 Gram(s) IV Push once  enoxaparin Injectable 40 milliGRAM(s) SubCutaneous every 12 hours  glucagon  Injectable 1 milliGRAM(s) IntraMuscular once  influenza  Vaccine (HIGH DOSE) 0.7 milliLiter(s) IntraMuscular once  insulin aspart (NovoLOG) corrective regimen sliding scale.   SubCutaneous three times a day before meals  insulin aspart Injectable (NovoLOG) 3 Unit(s) SubCutaneous three times a day before meals  irbesartan 150 milliGRAM(s) Oral <User Schedule>  Tresiba (Insulin Degludec) 100 units/ml 44 Unit(s) 44 Unit(s) SubCutaneous at bedtime    MEDICATIONS  (PRN):  acetaminophen     Tablet .. 650 milliGRAM(s) Oral every 6 hours PRN Temp greater or equal to 38C (100.4F), Mild Pain (1 - 3)  aluminum hydroxide/magnesium hydroxide/simethicone Suspension 30 milliLiter(s) Oral every 4 hours PRN Dyspepsia  dextrose Oral Gel 15 Gram(s) Oral once PRN Blood Glucose LESS THAN 70 milliGRAM(s)/deciliter  melatonin 3 milliGRAM(s) Oral at bedtime PRN Insomnia  ondansetron Injectable 4 milliGRAM(s) IV Push every 8 hours PRN Nausea and/or Vomiting         Vitals log        ICU Vital Signs Last 24 Hrs  T(C): 36.5 (24 Dec 2023 18:44), Max: 36.8 (24 Dec 2023 14:24)  T(F): 97.7 (24 Dec 2023 18:44), Max: 98.3 (24 Dec 2023 14:24)  HR: 78 (24 Dec 2023 18:44) (74 - 78)  BP: 130/55 (24 Dec 2023 18:44) (130/55 - 141/56)  BP(mean): --  ABP: --  ABP(mean): --  RR: 16 (24 Dec 2023 18:44) (16 - 17)  SpO2: 93% (24 Dec 2023 18:44) (93% - 94%)    O2 Parameters below as of 24 Dec 2023 18:44  Patient On (Oxygen Delivery Method): room air                 Input and Output:  I&O's Detail    24 Dec 2023 07:01  -  25 Dec 2023 05:48  --------------------------------------------------------  IN:  Total IN: 0 mL    OUT:    Voided (mL): 1200 mL  Total OUT: 1200 mL    Total NET: -1200 mL          Lab Data                        10.9   6.96  )-----------( 178      ( 24 Dec 2023 07:25 )             34.0     12-24    142  |  110<H>  |  22  ----------------------------<  138<H>  3.9   |  28  |  0.88    Ca    8.2<L>      24 Dec 2023 07:25    TPro  5.5<L>  /  Alb  2.5<L>  /  TBili  0.5  /  DBili  x   /  AST  20  /  ALT  58  /  AlkPhos  61  12-24            Review of Systems	      Objective     Physical Examination    heart s1s2  lung dc BS  head nc      Pertinent Lab findings & Imaging      Shikha:  NO   Adequate UO     I&O's Detail    24 Dec 2023 07:01  -  25 Dec 2023 05:48  --------------------------------------------------------  IN:  Total IN: 0 mL    OUT:    Voided (mL): 1200 mL  Total OUT: 1200 mL    Total NET: -1200 mL               Discussed with:     Cultures:	        Radiology

## 2023-12-25 NOTE — PROGRESS NOTE ADULT - ASSESSMENT
73 YOA female patient with PMHx for T1DM, HTN, SHANTE on CPAP, presenting today to the ED for a fall.    fall  SHANTE  HTN  DM    not able to use cpap without humidification   vs noted  sw follow up noted  ortho follow up    UA abd noted  GI eval noted  ENDO eval noted  CPAP night time  with Humidification  SHANTE - Sleep hygiene   monitor VS and HD and Sat  fall prec  DM care  serial FS

## 2023-12-25 NOTE — PROGRESS NOTE ADULT - PROVIDER SPECIALTY LIST ADULT
Hospitalist
Hospitalist
Pulmonology
Pulmonology
Hospitalist
Orthopedics
Pulmonology
Gastroenterology
Gastroenterology
Hospitalist
Endocrinology

## 2023-12-25 NOTE — DISCHARGE NOTE PROVIDER - NSDCMRMEDTOKEN_GEN_ALL_CORE_FT
acetaminophen 325 mg oral tablet: 2 tab(s) orally every 6 hours As needed Temp greater or equal to 38C (100.4F), Mild Pain (1 - 3)  Avapro 150 mg oral tablet: 1 tab(s) orally once a day  carvedilol 12.5 mg oral tablet: 1 tab(s) orally 2 times a day  enoxaparin: 40 milligram(s) subcutaneous once a day  NovoLOG 100 units/mL subcutaneous solution: 2 unit(s) subcutaneous 3 times a day (before meals) Ranges from 5-8 units depending on carb count  Tresiba FlexTouch 100 units/mL subcutaneous solution: 38 unit(s) subcutaneous once a day (at bedtime)

## 2023-12-25 NOTE — DISCHARGE NOTE PROVIDER - NSDCFUSCHEDAPPT_GEN_ALL_CORE_FT
Mary Cespedes  [facility] Physician Count includes the [facility]  UROLOGY [address]  Scheduled Appointment: 01/04/2024    Jessica Casey  [facility] Physician Count includes the [facility]  INTMED [address]  Scheduled Appointment: 01/12/2024    Mary Cespedes  [facility]  UROLOGY [address]  Scheduled Appointment: 01/25/2024    Mary Cespedes  [facility]  UROLOGY [address]  Scheduled Appointment: 02/08/2024    Mary Cespedes  [facility]  UROLOGY [address]  Scheduled Appointment: 02/29/2024    Mary Cespedes  [facility]  UROLOGY [address]  Scheduled Appointment: 03/21/2024    Eb Laurent  [facility] Physician Count includes the [facility]  CARDIOLOGY [address]  Scheduled Appointment: 03/21/2024     Mary Cespedes  [facility] Physician [facility]  UROLOGY [address]  Scheduled Appointment: 01/04/2024    Jessica Casey  [facility] Physician [facility]  INTMED [address]  Scheduled Appointment: 01/12/2024    Mary Cespedes  [facility]  UROLOGY [address]  Scheduled Appointment: 01/25/2024    Mary Cespedes  [facility]  UROLOGY [address]  Scheduled Appointment: 02/08/2024    Mary Cespedes  [facility]  UROLOGY [address]  Scheduled Appointment: 02/29/2024    Mary Cespedes  [facility]  UROLOGY [address]  Scheduled Appointment: 03/21/2024    Eb Laurent  [facility] Physician [facility]  CARDIOLOGY [address]  Scheduled Appointment: 03/21/2024

## 2023-12-25 NOTE — DISCHARGE NOTE NURSING/CASE MANAGEMENT/SOCIAL WORK - PATIENT PORTAL LINK FT
You can access the FollowMyHealth Patient Portal offered by [facility] by registering at the following website: [email or web address]. By joining [employer]’s FollowMyHealth portal, you will also be able to view your health information using other applications (apps) compatible with our system. You can access the FollowMyHealth Patient Portal offered by [facility] by registering at the following website: [email or web address]. By joining [employer]’s FollowMyHealth portal, you will also be able to view your health information using other applications (apps) compatible with our system.

## 2023-12-25 NOTE — PROGRESS NOTE ADULT - TIME BILLING
Note written by attending. Meds, labs, vitals, chart reviewed. Plan d/w patient. Went over test results and plan of care

## 2023-12-26 PROBLEM — I10 ESSENTIAL (PRIMARY) HYPERTENSION: Chronic | Status: ACTIVE | Noted: 2023-12-21

## 2023-12-29 ENCOUNTER — NON-APPOINTMENT (OUTPATIENT)
Age: 73
End: 2023-12-29

## 2024-01-02 ENCOUNTER — APPOINTMENT (OUTPATIENT)
Dept: ORTHOPEDIC SURGERY | Facility: CLINIC | Age: 74
End: 2024-01-02
Payer: MEDICARE

## 2024-01-02 DIAGNOSIS — M25.562 PAIN IN LEFT KNEE: ICD-10-CM

## 2024-01-02 DIAGNOSIS — S82.042A DISPLACED COMMINUTED FRACTURE OF LEFT PATELLA, INITIAL ENCOUNTER FOR CLOSED FRACTURE: ICD-10-CM

## 2024-01-02 PROCEDURE — 99204 OFFICE O/P NEW MOD 45 MIN: CPT

## 2024-01-02 NOTE — HISTORY OF PRESENT ILLNESS
[de-identified] : Patient is a 73 year-old female who presents to the office today for initial evaluation of her left knee. She had a mechanical fall, tripping over an uneven floor on 12/21/2023. She landed on her left knee and felt immediate radiating pain to her foot. She describes the pain as throbbing in nature. Currently, she states she has very little pain at rest. She presents in a wheelchair today with the left lower extremity elevated and complaining of 1/10 severity pain. Currently, she takes Tylenol which helps. She was seen in [facility] the same day of the injury and spent 3 days in the hospital and went to rehab facility afterwards, from which she currently still resides. She has been in the rehab for the last 3-4 days. She presents today for further treatment options. Patient is currently at a rehabilitation facility in [facility].   No bleeding, fever, chills, sweats, nausea or vomiting were endorsed at this visit. The above history is in addition to the intake form, which I personally reviewed at length, including the patient's medical, surgical, and family history. The patient's allergies were also carefully reviewed. In addition, the family and social history of the patient were also reviewed, which are non-contributory to this visit unless specified above. All of this has been documented accordingly in the visit note.

## 2024-01-02 NOTE — END OF VISIT
[FreeTextEntry3] :  All medical record entries made by the Scribe were at my,  Dr. Jony Worthington MD., direction and personally dictated by me on 01/02/2024. I have personally reviewed the chart and agree that the record accurately reflects my personal performance of the history, physical exam, assessment and plan.

## 2024-01-02 NOTE — PHYSICAL EXAM
[de-identified] :  Patient is WDWN, alert, and in no acute distress. Breathing is unlabored. She is grossly oriented to person, place, and time.  She was accompanied by her sister today.  She ambulates in a wheelchair.   Left Knee:   Patient present in knee immobilizer  There is medial/lateral joint line tenderness to palpation. Swelling and valgus or valgus instability present on provocative testing.   Range of motion: Active flexion and extension are very limited.   Strength: limited flexion and extension  [de-identified] : The CT image revealed displaced left patella fracture with comminution.

## 2024-01-02 NOTE — ADDENDUM
[FreeTextEntry1] :  I, Bela Andres wrote this note acting as a scribe for Dr. Jony Worthington on Jan 02, 2024.

## 2024-01-04 ENCOUNTER — APPOINTMENT (OUTPATIENT)
Dept: UROLOGY | Facility: CLINIC | Age: 74
End: 2024-01-04

## 2024-01-11 ENCOUNTER — APPOINTMENT (OUTPATIENT)
Dept: CV DIAGNOSTICS | Facility: HOSPITAL | Age: 74
End: 2024-01-11

## 2024-01-11 ENCOUNTER — OUTPATIENT (OUTPATIENT)
Dept: OUTPATIENT SERVICES | Facility: HOSPITAL | Age: 74
LOS: 1 days | End: 2024-01-11
Payer: MEDICARE

## 2024-01-11 DIAGNOSIS — I25.10 ATHEROSCLEROTIC HEART DISEASE OF NATIVE CORONARY ARTERY WITHOUT ANGINA PECTORIS: ICD-10-CM

## 2024-01-11 DIAGNOSIS — Z86.018 PERSONAL HISTORY OF OTHER BENIGN NEOPLASM: Chronic | ICD-10-CM

## 2024-01-11 DIAGNOSIS — Z98.62 PERIPHERAL VASCULAR ANGIOPLASTY STATUS: Chronic | ICD-10-CM

## 2024-01-11 PROCEDURE — 78452 HT MUSCLE IMAGE SPECT MULT: CPT | Mod: 26,MH

## 2024-01-11 PROCEDURE — 93016 CV STRESS TEST SUPVJ ONLY: CPT | Mod: MH

## 2024-01-11 PROCEDURE — 93018 CV STRESS TEST I&R ONLY: CPT | Mod: MH

## 2024-01-12 ENCOUNTER — APPOINTMENT (OUTPATIENT)
Dept: INTERNAL MEDICINE | Facility: CLINIC | Age: 74
End: 2024-01-12

## 2024-01-16 ENCOUNTER — TRANSCRIPTION ENCOUNTER (OUTPATIENT)
Age: 74
End: 2024-01-16

## 2024-01-17 ENCOUNTER — TRANSCRIPTION ENCOUNTER (OUTPATIENT)
Age: 74
End: 2024-01-17

## 2024-01-17 ENCOUNTER — OUTPATIENT (OUTPATIENT)
Dept: OUTPATIENT SERVICES | Facility: HOSPITAL | Age: 74
LOS: 1 days | End: 2024-01-17
Payer: MEDICARE

## 2024-01-17 VITALS
TEMPERATURE: 98 F | HEIGHT: 68 IN | OXYGEN SATURATION: 96 % | WEIGHT: 259.93 LBS | HEART RATE: 74 BPM | SYSTOLIC BLOOD PRESSURE: 152 MMHG | RESPIRATION RATE: 17 BRPM | DIASTOLIC BLOOD PRESSURE: 72 MMHG

## 2024-01-17 VITALS
DIASTOLIC BLOOD PRESSURE: 70 MMHG | SYSTOLIC BLOOD PRESSURE: 155 MMHG | HEART RATE: 90 BPM | TEMPERATURE: 98 F | RESPIRATION RATE: 16 BRPM | OXYGEN SATURATION: 96 %

## 2024-01-17 DIAGNOSIS — S82.042A DISPLACED COMMINUTED FRACTURE OF LEFT PATELLA, INITIAL ENCOUNTER FOR CLOSED FRACTURE: ICD-10-CM

## 2024-01-17 DIAGNOSIS — Z98.62 PERIPHERAL VASCULAR ANGIOPLASTY STATUS: Chronic | ICD-10-CM

## 2024-01-17 DIAGNOSIS — Z86.018 PERSONAL HISTORY OF OTHER BENIGN NEOPLASM: Chronic | ICD-10-CM

## 2024-01-17 LAB
GLUCOSE BLDC GLUCOMTR-MCNC: 148 MG/DL — HIGH (ref 70–99)
GLUCOSE BLDC GLUCOMTR-MCNC: 164 MG/DL — HIGH (ref 70–99)
GLUCOSE BLDC GLUCOMTR-MCNC: 190 MG/DL — HIGH (ref 70–99)

## 2024-01-17 PROCEDURE — 76000 FLUOROSCOPY <1 HR PHYS/QHP: CPT

## 2024-01-17 PROCEDURE — 78452 HT MUSCLE IMAGE SPECT MULT: CPT | Mod: MH

## 2024-01-17 PROCEDURE — C9399: CPT

## 2024-01-17 PROCEDURE — 82962 GLUCOSE BLOOD TEST: CPT

## 2024-01-17 PROCEDURE — 93017 CV STRESS TEST TRACING ONLY: CPT

## 2024-01-17 PROCEDURE — 27524 TREAT KNEECAP FRACTURE: CPT | Mod: LT

## 2024-01-17 PROCEDURE — C1713: CPT

## 2024-01-17 PROCEDURE — A9500: CPT

## 2024-01-17 DEVICE — GWIRE 2X150MM: Type: IMPLANTABLE DEVICE | Site: LEFT | Status: FUNCTIONAL

## 2024-01-17 RX ORDER — CARVEDILOL PHOSPHATE 80 MG/1
1 CAPSULE, EXTENDED RELEASE ORAL
Refills: 0 | DISCHARGE

## 2024-01-17 RX ORDER — IRBESARTAN 75 MG/1
1 TABLET ORAL
Refills: 0 | DISCHARGE

## 2024-01-17 RX ORDER — OXYCODONE HYDROCHLORIDE 5 MG/1
0.5 TABLET ORAL
Qty: 0 | Refills: 0 | DISCHARGE
Start: 2024-01-17

## 2024-01-17 RX ORDER — ONDANSETRON 8 MG/1
4 TABLET, FILM COATED ORAL ONCE
Refills: 0 | Status: COMPLETED | OUTPATIENT
Start: 2024-01-17 | End: 2024-01-17

## 2024-01-17 RX ORDER — HYDROMORPHONE HYDROCHLORIDE 2 MG/ML
1 INJECTION INTRAMUSCULAR; INTRAVENOUS; SUBCUTANEOUS
Refills: 0 | Status: DISCONTINUED | OUTPATIENT
Start: 2024-01-17 | End: 2024-01-22

## 2024-01-17 RX ORDER — INSULIN DEGLUDEC 100 U/ML
38 INJECTION, SOLUTION SUBCUTANEOUS
Qty: 0 | Refills: 0 | DISCHARGE

## 2024-01-17 RX ORDER — FAMOTIDINE 10 MG/ML
20 INJECTION INTRAVENOUS ONCE
Refills: 0 | Status: COMPLETED | OUTPATIENT
Start: 2024-01-17 | End: 2024-01-17

## 2024-01-17 RX ORDER — OXYCODONE HYDROCHLORIDE 5 MG/1
5 TABLET ORAL EVERY 4 HOURS
Refills: 0 | Status: DISCONTINUED | OUTPATIENT
Start: 2024-01-17 | End: 2024-01-17

## 2024-01-17 RX ORDER — CHLORHEXIDINE GLUCONATE 213 G/1000ML
1 SOLUTION TOPICAL DAILY
Refills: 0 | Status: DISCONTINUED | OUTPATIENT
Start: 2024-01-17 | End: 2024-01-17

## 2024-01-17 RX ORDER — HYDROMORPHONE HYDROCHLORIDE 2 MG/ML
0.5 INJECTION INTRAMUSCULAR; INTRAVENOUS; SUBCUTANEOUS
Refills: 0 | Status: DISCONTINUED | OUTPATIENT
Start: 2024-01-17 | End: 2024-01-22

## 2024-01-17 RX ORDER — OXYCODONE HYDROCHLORIDE 5 MG/1
2.5 TABLET ORAL EVERY 4 HOURS
Refills: 0 | Status: DISCONTINUED | OUTPATIENT
Start: 2024-01-17 | End: 2024-01-17

## 2024-01-17 RX ORDER — INSULIN ASPART 100 [IU]/ML
2 INJECTION, SOLUTION SUBCUTANEOUS
Qty: 0 | Refills: 0 | DISCHARGE

## 2024-01-17 RX ORDER — DIPHENHYDRAMINE HCL 50 MG
12.5 CAPSULE ORAL ONCE
Refills: 0 | Status: COMPLETED | OUTPATIENT
Start: 2024-01-17 | End: 2024-01-17

## 2024-01-17 RX ADMIN — ONDANSETRON 4 MILLIGRAM(S): 8 TABLET, FILM COATED ORAL at 13:38

## 2024-01-17 RX ADMIN — Medication 12.5 MILLIGRAM(S): at 15:12

## 2024-01-17 RX ADMIN — HYDROMORPHONE HYDROCHLORIDE 0.5 MILLIGRAM(S): 2 INJECTION INTRAMUSCULAR; INTRAVENOUS; SUBCUTANEOUS at 12:22

## 2024-01-17 RX ADMIN — FAMOTIDINE 20 MILLIGRAM(S): 10 INJECTION INTRAVENOUS at 15:11

## 2024-01-17 RX ADMIN — HYDROMORPHONE HYDROCHLORIDE 0.5 MILLIGRAM(S): 2 INJECTION INTRAMUSCULAR; INTRAVENOUS; SUBCUTANEOUS at 12:39

## 2024-01-17 RX ADMIN — HYDROMORPHONE HYDROCHLORIDE 0.5 MILLIGRAM(S): 2 INJECTION INTRAMUSCULAR; INTRAVENOUS; SUBCUTANEOUS at 12:52

## 2024-01-17 RX ADMIN — HYDROMORPHONE HYDROCHLORIDE 0.5 MILLIGRAM(S): 2 INJECTION INTRAMUSCULAR; INTRAVENOUS; SUBCUTANEOUS at 12:09

## 2024-01-17 RX ADMIN — OXYCODONE HYDROCHLORIDE 5 MILLIGRAM(S): 5 TABLET ORAL at 17:56

## 2024-01-17 NOTE — ASU PATIENT PROFILE, ADULT - TEACHING/LEARNING FACTORS IMPACT ABILITY TO LEARN
MDT-DUAL CHAMBER PPM (AAIR-DDDR MODE)/ACTIVE SYSTEM IS MRI CONDITIONAL   NON-BILLABLE CARELINK TRANSMISSION - 2 WEEK EVAL OF RATE RESPONSE ADJUSTMENTS: BATTERY VOLTAGE ADEQUATE (6 YRS)  AP 99%  <0 1%  ALL AVAILABLE LEAD PARAMETERS WITHIN NORMAL LIMITS  NO SIGNIFICANT HIGH RATE EPISODES  EF 65% (7/2021 STRESS ECHO)  PT TAKES ELIQUIS, SOTALOL  LITTLE TO NO HISTOGRAM CHANGE FROM PREVIOUS REMOTE PROCESSED 6/23/21   NORMAL DEVICE FUNCTION    EBS
none

## 2024-01-17 NOTE — ASU DISCHARGE PLAN (ADULT/PEDIATRIC) - ASU DC SPECIAL INSTRUCTIONSFT
Follow up with Dr Martínez in 10-14 days. Please call office for appointment. Take medications as prescribed. Keep dressing/splint clean, dry, and intact. Rest, ice, and elevate affected extremity. Weight bear as tolerated on left leg in brace at all times. Do not remove brace for any reason. Do next bend the knee. Follow up with Dr Martínez in 10-14 days. Please call office for appointment. Take pain medications as prescribed. Keep dressing/splint clean, dry, and intact. Rest, ice, and elevate affected extremity. Toe Touch weight bear as tolerated on left leg in brace at all times. Do not remove brace for any reason. Do not bend the knee. Continue Lovenox for DVT prophylaxis for 40 days. Hit face on dresser approx 30 minutes ago, no LOC, no n/v/. No PMH, IUTD. 1 inch vertical laceration above right eyebrow. Not actively bleeding. No PMH, IUTD Follow up with Dr Martínez in 10-14 days. Please call office for appointment. Take pain medications as prescribed. Keep dressing/splint clean, dry, and intact. Rest, ice, and elevate affected extremity. May be nonweightbearing for 48hours post op then advance to toe touch weight bear as tolerated on left leg in brace at all times. Do not remove brace for any reason. Do not bend the knee. Continue Lovenox for DVT prophylaxis for 40 days.

## 2024-01-17 NOTE — PROGRESS NOTE ADULT - SUBJECTIVE AND OBJECTIVE BOX
Medical history as documented in hospital/rehab chart  Plan for ORIF left patella.  All questions answered.
Patient 73 Y female post left patella tendon repair surgery   Orthopedics ordered a LLE Katharine post op knee orthosis double  upright locked in full extension to aid in weight bearing and walking  assist in healing and recovery custom measured fitted to  patient's left leg today by [facility] [phone]

## 2024-01-17 NOTE — PRE-ANESTHESIA EVALUATION ADULT - NSANTHPMHFT_GEN_ALL_CORE
73F PMHx for T1DM, HTN, SHANTE on CPAP, asthma, found to have left patella fracture. Now scheduled for left ORIF of patella with Dr. Martínez.

## 2024-01-17 NOTE — H&P ADULT - HISTORY OF PRESENT ILLNESS
73F presents for surgery for left patella ORIF. Patient sustained fracture during mechanical fall on 12/21/23 after walking her dog. Presented to [facility] and found to have left patella fracture. Discharged with knee immobilizer and here for fixation.     Exam:   T(C): 36.8 (01-17-24 @ 08:49), Max: 36.8 (01-17-24 @ 08:49)  T(F): 98.2 (01-17-24 @ 08:49), Max: 98.2 (01-17-24 @ 08:49)  HR: 74 (01-17-24 @ 08:49) (74 - 74)  BP: 152/72 (01-17-24 @ 08:49) (152/72 - 152/72)  RR: 17 (01-17-24 @ 08:49) (17 - 17)  SpO2: 96% (01-17-24 @ 08:49) (96% - 96%)    Physical Exam:  General: NAD, Alert, Awake and oriented  Respiratory: Symmetric chest wall expansion bilaterally, no accessory muscle use  LEFT LE: No open skin. Obvious deformity noted about the knee. +Edema and ecchymosis noted at the knee. Able to flex and extend knee with minimal pain, EHL/TA/GS intact, Unable to actively SLR. SILT. 2+ DP/PT pulses with brisk cap refill distally. Compartments soft and compressible.     Secondary Survey:   RLE/RUE/LUE: No TTP over bony prominences, SILT, palpable pulses, full/painless range of motion, compartments soft    73F with L patella fracture    Plan:   OR today with Dr. Mauricio coleman management PRN  Discussed with Dr. Martínez who agrees with the plan.

## 2024-01-17 NOTE — ASU PREOP CHECKLIST - WAS PATIENT ON BETA BLOCKER?
Progress Note (Hospitalist, Internal Medicine)  IDENTIFYING INFORMATION   PATIENT:  Samson Payan  MRN:  [id number]  ADMIT DATE: 4/23/2019  TIME OF EVALUATION: 4/26/2019 9:39 AM      HISTORY OF PRESENT ILLNESS   Samson Payan is a 62 y.o. female with a past medical history of as noted below who present with 2 issues:     1) Swelling,SOB  - 1 day hx of SOB today associated with several days of progressive swelling of her legs and abdomen. Gets winded, tachypneic ambulation around her home. Associated weight gain several poinds. Stomach swelling     2) Erythema and pain along abdominal pannus  - 5 days hx of spreading erythema and local pain along abdominal pannus w/o signficant improvement. She uses talc powder around her skin creases but has encountered associated increased inflammation and firmness of the abdominal wall skin    SUBJECTIVE     Had fall in bathroom yesterday. Slid down onto back and head  UOP has decreased    MEDICATIONS   Medications Prior to Admission  Medications Prior to Admission: metFORMIN (GLUCOPHAGE) 500 MG tablet, Take 500 mg by mouth 2 times daily (with meals)  gabapentin (NEURONTIN) 800 MG tablet, Take 800 mg by mouth 3 times daily. nystatin (MYCOSTATIN) 167149 UNIT/GM powder, Apply topically 2 times daily as needed  metoprolol tartrate (LOPRESSOR) 50 MG tablet, Take 50 mg by mouth 2 times daily  ondansetron (ZOFRAN-ODT) 4 MG disintegrating tablet, Take 1 tablet by mouth every 8 hours as needed for Nausea or Vomiting  ranolazine (RANEXA) 500 MG extended release tablet, Take 1 tablet by mouth 2 times daily  Cholecalciferol (VITAMIN D3) 2000 units TABS, Take 2,000 Units by mouth daily  BREO ELLIPTA 100-25 MCG/INH AEPB inhaler, Inhale 1 puff into the lungs daily  HYDROcodone-acetaminophen (NORCO) 5-325 MG per tablet, Take 1 tablet by mouth every 4 hours as needed. Radha Simms   levothyroxine (SYNTHROID) 50 MCG tablet, Take 50 mcg by mouth daily  VICTOZA 18 MG/3ML SOPN SC injection, Inject 1.2 mg into the skin daily  insulin glargine (LANTUS SOLOSTAR) 100 UNIT/ML injection pen, Inject 55 Units into the skin nightly  insulin lispro (HUMALOG) 100 UNIT/ML injection vial, Inject 20 Units into the skin 3 times daily (with meals)  linagliptin (TRADJENTA) 5 MG tablet, Take 1 tablet by mouth daily  buPROPion (WELLBUTRIN SR) 100 MG extended release tablet, Take 100 mg by mouth 2 times daily  aspirin 81 MG chewable tablet, Take 1 tablet by mouth daily  DULoxetine (CYMBALTA) 60 MG extended release capsule, Take 1 capsule by mouth daily  atorvastatin (LIPITOR) 40 MG tablet, Take 1 tablet by mouth nightly  docusate (COLACE, DULCOLAX) 100 MG CAPS, Take 100 mg by mouth 2 times daily  clopidogrel (PLAVIX) 75 MG tablet, Take 1 tablet by mouth daily  pantoprazole (PROTONIX) 40 MG tablet, Take 1 tablet by mouth every morning (before breakfast)  albuterol sulfate HFA (VENTOLIN HFA) 108 (90 Base) MCG/ACT inhaler, Inhale 2 puffs into the lungs every 4 hours as needed for Wheezing    Current Medications  Current Facility-Administered Medications   Medication Dose Route Frequency Provider Last Rate Last Dose    0.9 % sodium chloride bolus  500 mL Intravenous Once Zohra Franco  mL/hr at 04/26/19 0812 500 mL at 04/26/19 0812    tiZANidine (ZANAFLEX) tablet 4 mg  4 mg Oral Q6H PRN Zohra Franco MD   4 mg at 04/26/19 0850    oxyCODONE HCl (OXY-IR) immediate release tablet 10 mg  10 mg Oral Q4H PRN Zohra Franco MD   10 mg at 04/25/19 2254    insulin lispro (HUMALOG) injection vial 0-12 Units  0-12 Units Subcutaneous TID WC Zohra Franco MD   8 Units at 04/26/19 0814    insulin lispro (HUMALOG) injection vial 0-6 Units  0-6 Units Subcutaneous Nightly Zohra Franco MD   2 Units at 04/25/19 2056    acetaminophen (TYLENOL) tablet 650 mg  650 mg Oral Q4H PRN Zohra Franco MD   650 mg at 04/24/19 1817    aspirin chewable tablet 81 mg  81 mg Oral Daily Zohra Franco MD   81 mg at 04/26/19 0759    atorvastatin (LIPITOR) tablet 40 mg 40 mg Oral Nightly Giovanna Munguia MD   40 mg at 04/25/19 2051    mometasone-formoterol (DULERA) 200-5 MCG/ACT inhaler 2 puff  2 puff Inhalation BID Giovanna Munguia MD   2 puff at 04/26/19 0815    buPROPion [facility] SR) extended release tablet 100 mg  100 mg Oral BID Giovanna Munguia MD   100 mg at 04/26/19 0758    vitamin D CAPS 2,000 Units  2,000 Units Oral Daily Giovanna Munguia MD   2,000 Units at 04/26/19 0800    clopidogrel (PLAVIX) tablet 75 mg  75 mg Oral Daily Giovanna Munguia MD   75 mg at 04/26/19 0758    docusate sodium (COLACE) capsule 100 mg  100 mg Oral BID Giovanna Munguia MD   100 mg at 04/26/19 0800    DULoxetine (CYMBALTA) extended release capsule 60 mg  60 mg Oral Daily Giovanna Munguia MD   60 mg at 04/26/19 0800    gabapentin (NEURONTIN) capsule 800 mg  800 mg Oral TID Giovanna Munguia MD   800 mg at 04/26/19 0800    HYDROcodone-acetaminophen (NORCO) 5-325 MG per tablet 1 tablet  1 tablet Oral Q4H PRN Giovanna Munguia MD   1 tablet at 04/26/19 0759    insulin glargine (LANTUS) injection vial 55 Units  55 Units Subcutaneous Nightly Giovanna Munguia MD   55 Units at 04/25/19 2055    insulin lispro (HUMALOG) injection vial 20 Units  20 Units Subcutaneous TID WC Giovanna Munguia MD   20 Units at 04/26/19 0816    levothyroxine (SYNTHROID) tablet 50 mcg  50 mcg Oral Daily Giovanna Munguia MD   50 mcg at 04/26/19 0801    linagliptin (TRADJENTA) tablet 5 mg  5 mg Oral Daily Giovanna Munguia MD   5 mg at 04/26/19 0800    metoprolol tartrate (LOPRESSOR) tablet 50 mg  50 mg Oral BID Giovanna Munguia MD   50 mg at 04/26/19 0800    ondansetron (ZOFRAN-ODT) disintegrating tablet 4 mg  4 mg Oral Q8H PRN Giovanna Munguia MD        pantoprazole (PROTONIX) tablet 40 mg  40 mg Oral QAM AC Giovanna Munguia MD   40 mg at 04/26/19 0506    ranolazine (RANEXA) extended release tablet 500 mg  500 mg Oral BID Giovanna Munguia MD   500 mg at 04/26/19 0759    enoxaparin (LOVENOX) injection 40 mg  40 mg Subcutaneous Daily Giovanna Munguia MD   40 mg at 04/26/19 0758    miconazole (MICOTIN) 2 % powder   Topical BID Reba Hobbs MD        ceFAZolin (ANCEF) 1 g in dextrose 5 % 50 mL IVPB (premix)  1 g Intravenous Q8H Reba Hobbs MD   Stopped at 04/26/19 7626    doxycycline hyclate (VIBRA-TABS) tablet 100 mg  100 mg Oral 2 times per day Reba Hobbs MD   100 mg at 04/26/19 0800    lactobacillus (CULTURELLE) capsule 1 capsule  1 capsule Oral BID WC Reba Hobbs MD   1 capsule at 04/26/19 0759    glucose (GLUTOSE) 40 % oral gel 15 g  15 g Oral PRN Reba Hobbs MD        dextrose 50 % solution 12.5 g  12.5 g Intravenous PRN Reba Hobbs MD        glucagon (rDNA) injection 1 mg  1 mg Intramuscular PRN Reba Hobbs MD        dextrose 5 % solution  100 mL/hr Intravenous PRN Reba Hobbs MD        cloNIDine (CATAPRES) tablet 0.1 mg  0.1 mg Oral 4x Daily PRN Reba Hobbs MD             Allergies  No Known Allergies    REVIEW OF SYSTEMS     Within above limitations. 14 point review of systems reviewed. Pertinent positive or negative as per HPI or otherwise negative per 14 point systems review. Reviewed 4/26/2019 at 9:39 AM    PHYSICAL EXAM       Blood pressure 133/67, pulse 73, temperature 98.1 °F (36.7 °C), temperature source Oral, resp. rate 16, height 5' 4\" (1.626 m), weight (!) 318 lb (144.2 kg), SpO2 97 %, not currently breastfeeding. General - AAO x 3  Psych - Appropriate affect/speech. No agitation  Eyes - Eye lids intact. No scleral icterus  ENT - Lips wnl. External ear clear/dry/intact. No thyromegaly on inspection  Neuro - No gross peripheral or central neuro deficits on inspection  Heart - Sinus. RRR. S1 and S2 present. No elevated JVD appreciated  Lung - Adequate air entry b/l, No crackles/wheezes appreciated  GI - Soft. No guarding/rigidity.  BS+  Skin - b/l LE edema +2, erythema and pain along of abdominal pannus is improving        LABS AND IMAGING   CBC  [unfilled]    Last 3 Hemoglobin  Lab Results   Component Value Date    HGB 10.1 Cristi Tao, nephrology known to her     Panniculitis  - fungal powder for skin creases  - IV cefazolin, PO doxy  - improved and now almost cleared    Blood was sent in the ED - 1 set was drawn, 2nd set ordered but not drawn - cx with pan sensitive serratia and prelim staph.  Due to absence of sepsis and polymicrobial, highly suspect this to be a contaminant  - Repeat 2 set blood cx 4/24       Mechanical Fall in bathroom 4/24  - chronic rotator cuff injury  - check L spine CT and head CT per protocol that is non acute      HTN, uncontrolled - improved with diuretics, continue to monitor  CAD s/p stents  CKD3  DMII  HLD  Morbid obesity     Lovenox ppx      [facility], Internal Medicine  4/26/2019 at 9:39 AM Yes

## 2024-01-17 NOTE — ASU DISCHARGE PLAN (ADULT/PEDIATRIC) - WEIGHT BEARING INSTRUCTIONS
in brace at all times, do not remove brace or bend the knee Toe Touch weight bearing left leg in brace at all times, do not remove brace or bend the knee

## 2024-01-17 NOTE — ASU DISCHARGE PLAN (ADULT/PEDIATRIC) - NS MD DC FALL RISK RISK
For information on Fall & Injury Prevention, visit: [email or web address] OR  [email or web address] OR  https://www.cdc.gov/steadi/patient.html

## 2024-01-17 NOTE — ASU DISCHARGE PLAN (ADULT/PEDIATRIC) - PROCEDURE
Left Patella ORIF Left Partial Patellectomy and Tendon Advancement Left Partial Patellectomy And Tendon Advancement

## 2024-01-17 NOTE — ASU DISCHARGE PLAN (ADULT/PEDIATRIC) - CARE PROVIDER_API CALL
Tu Martínez  Orthopaedic Surgery  [address] [place] [address], [facility] [address]  [place] [address]  Phone: [phone]  Fax: [phone]  Follow Up Time:

## 2024-01-17 NOTE — ASU PATIENT PROFILE, ADULT - FALL HARM RISK - UNIVERSAL INTERVENTIONS
Bed in lowest position, wheels locked, appropriate side rails in place/Call bell, personal items and telephone in reach/Instruct patient to call for assistance before getting out of bed or chair/Non-slip footwear when patient is out of bed/[place] to call system/Physically safe environment - no spills, clutter or unnecessary equipment/Purposeful Proactive Rounding/Room/bathroom lighting operational, light cord in reach

## 2024-01-25 ENCOUNTER — APPOINTMENT (OUTPATIENT)
Dept: UROLOGY | Facility: CLINIC | Age: 74
End: 2024-01-25

## 2024-01-31 RX ORDER — IRBESARTAN 150 MG/1
150 TABLET, FILM COATED ORAL
Qty: 90 | Refills: 3 | Status: ACTIVE | COMMUNITY
Start: 2023-03-30 | End: 1900-01-01

## 2024-02-08 ENCOUNTER — APPOINTMENT (OUTPATIENT)
Dept: UROLOGY | Facility: CLINIC | Age: 74
End: 2024-02-08

## 2024-02-16 ENCOUNTER — APPOINTMENT (OUTPATIENT)
Dept: UROLOGY | Facility: CLINIC | Age: 74
End: 2024-02-16

## 2024-02-29 ENCOUNTER — APPOINTMENT (OUTPATIENT)
Dept: UROLOGY | Facility: CLINIC | Age: 74
End: 2024-02-29

## 2024-03-20 DIAGNOSIS — R30.0 DYSURIA: ICD-10-CM

## 2024-03-21 ENCOUNTER — NON-APPOINTMENT (OUTPATIENT)
Age: 74
End: 2024-03-21

## 2024-03-21 ENCOUNTER — APPOINTMENT (OUTPATIENT)
Dept: CARDIOLOGY | Facility: CLINIC | Age: 74
End: 2024-03-21
Payer: MEDICARE

## 2024-03-21 ENCOUNTER — APPOINTMENT (OUTPATIENT)
Dept: INTERNAL MEDICINE | Facility: CLINIC | Age: 74
End: 2024-03-21
Payer: MEDICARE

## 2024-03-21 ENCOUNTER — APPOINTMENT (OUTPATIENT)
Dept: UROLOGY | Facility: CLINIC | Age: 74
End: 2024-03-21

## 2024-03-21 VITALS
HEART RATE: 97 BPM | HEIGHT: 68 IN | SYSTOLIC BLOOD PRESSURE: 162 MMHG | WEIGHT: 268 LBS | OXYGEN SATURATION: 99 % | BODY MASS INDEX: 40.62 KG/M2 | TEMPERATURE: 97.5 F | DIASTOLIC BLOOD PRESSURE: 90 MMHG

## 2024-03-21 DIAGNOSIS — E78.5 HYPERLIPIDEMIA, UNSPECIFIED: ICD-10-CM

## 2024-03-21 DIAGNOSIS — N30.00 ACUTE CYSTITIS W/OUT HEMATURIA: ICD-10-CM

## 2024-03-21 DIAGNOSIS — I25.10 ATHEROSCLEROTIC HEART DISEASE OF NATIVE CORONARY ARTERY W/OUT ANGINA PECTORIS: ICD-10-CM

## 2024-03-21 LAB
APPEARANCE: ABNORMAL
BACTERIA: ABNORMAL /HPF
BILIRUBIN URINE: NEGATIVE
BLOOD URINE: ABNORMAL
CAST: 0 /LPF
COLOR: YELLOW
EPITHELIAL CELLS: 15 /HPF
GLUCOSE QUALITATIVE U: NEGATIVE MG/DL
KETONES URINE: NEGATIVE MG/DL
LEUKOCYTE ESTERASE URINE: ABNORMAL
MICROSCOPIC-UA: NORMAL
NITRITE URINE: POSITIVE
PH URINE: 5.5
PROTEIN URINE: 30 MG/DL
RED BLOOD CELLS URINE: 10 /HPF
REVIEW: NORMAL
SPECIFIC GRAVITY URINE: 1.02
UROBILINOGEN URINE: 0.2 MG/DL
WBC CLUMPS: PRESENT
WHITE BLOOD CELLS URINE: >998 /HPF

## 2024-03-21 PROCEDURE — 93000 ELECTROCARDIOGRAM COMPLETE: CPT

## 2024-03-21 PROCEDURE — 99212 OFFICE O/P EST SF 10 MIN: CPT

## 2024-03-21 PROCEDURE — 99214 OFFICE O/P EST MOD 30 MIN: CPT

## 2024-03-21 RX ORDER — NITROFURANTOIN (MONOHYDRATE/MACROCRYSTALS) 25; 75 MG/1; MG/1
100 CAPSULE ORAL
Qty: 14 | Refills: 1 | Status: ACTIVE | COMMUNITY
Start: 2023-08-31 | End: 1900-01-01

## 2024-03-21 NOTE — HISTORY OF PRESENT ILLNESS
[FreeTextEntry8] : Pt here today with frequency, urgency and odor for several days Urine is cloudy. Pt tired. No back pain or temps.

## 2024-03-21 NOTE — ASSESSMENT
[FreeTextEntry1] : acute UTI- start Macrobid 100mg bid for 7 days. Await cx  Time spent on phone and charting 15 minutes.

## 2024-03-23 PROBLEM — I25.10 CAD (CORONARY ARTERY DISEASE): Status: ACTIVE | Noted: 2019-01-16

## 2024-03-23 PROBLEM — E78.5 HYPERLIPEMIA: Status: ACTIVE | Noted: 2023-05-07

## 2024-03-23 NOTE — DISCUSSION/SUMMARY
[FreeTextEntry1] : Resume Coreg dose given h/o CAD and PCI with HTN  [EKG obtained to assist in diagnosis and management of assessed problem(s)] : EKG obtained to assist in diagnosis and management of assessed problem(s)

## 2024-03-23 NOTE — PHYSICAL EXAM
[Well Developed] : well developed [Well Nourished] : well nourished [No Acute Distress] : no acute distress [Obese] : obese [Normal Venous Pressure] : normal venous pressure [Normal Conjunctiva] : normal conjunctiva [No Murmur] : no murmur [Normal S1, S2] : normal S1, S2 [No Carotid Bruit] : no carotid bruit [No Rub] : no rub [No Gallop] : no gallop [No Respiratory Distress] : no respiratory distress  [Clear Lung Fields] : clear lung fields [Good Air Entry] : good air entry [Non Tender] : non-tender [Soft] : abdomen soft [No Masses/organomegaly] : no masses/organomegaly [Abnormal Gait] : abnormal gait [Normal Bowel Sounds] : normal bowel sounds [No Edema] : no edema [No Cyanosis] : no cyanosis [No Rash] : no rash [No Clubbing] : no clubbing [No Varicosities] : no varicosities [No Skin Lesions] : no skin lesions [Moves all extremities] : moves all extremities [No Focal Deficits] : no focal deficits [Alert and Oriented] : alert and oriented [Normal memory] : normal memory [Normal Speech] : normal speech [de-identified] : knee brace - uses a walker

## 2024-03-23 NOTE — HISTORY OF PRESENT ILLNESS
[FreeTextEntry1] : Trudy is seeing me for a f/u.  s/p Patella Fx and now in PT She has stopped all her BP meds She has not CP

## 2024-03-23 NOTE — CARDIOLOGY SUMMARY
[de-identified] : 3/21/24 Sinus Rhythm -occasional PAC   # PACs = 1. Low voltage in precordial leads. -Poor R-wave progression -may be secondary to pulmonary disease  consider old anterior infarct. -Nonspecific T-abnormality.  ABNORMAL

## 2024-03-25 LAB — BACTERIA UR CULT: ABNORMAL

## 2024-03-25 RX ORDER — CARVEDILOL 12.5 MG/1
12.5 TABLET, FILM COATED ORAL TWICE DAILY
Qty: 180 | Refills: 3 | Status: ACTIVE | COMMUNITY
Start: 2023-03-30 | End: 1900-01-01

## 2024-03-27 ENCOUNTER — RX RENEWAL (OUTPATIENT)
Age: 74
End: 2024-03-27

## 2024-03-27 RX ORDER — CARVEDILOL 12.5 MG/1
12.5 TABLET, FILM COATED ORAL
Qty: 180 | Refills: 3 | Status: ACTIVE | COMMUNITY
Start: 2024-03-27 | End: 1900-01-01

## 2024-04-11 ENCOUNTER — APPOINTMENT (OUTPATIENT)
Dept: UROLOGY | Facility: CLINIC | Age: 74
End: 2024-04-11

## 2024-04-18 ENCOUNTER — APPOINTMENT (OUTPATIENT)
Dept: UROLOGY | Facility: CLINIC | Age: 74
End: 2024-04-18

## 2024-05-02 ENCOUNTER — APPOINTMENT (OUTPATIENT)
Dept: UROLOGY | Facility: CLINIC | Age: 74
End: 2024-05-02

## 2024-05-08 NOTE — ED ADULT NURSE NOTE - NSFALLHARMRISKINTERV_ED_ALL_ED
Assistance OOB with selected safe patient handling equipment if applicable/Assistance with ambulation/Communicate risk of Fall with Harm to all staff, patient, and family/Provide visual cue: red socks, yellow wristband, yellow gown, etc/Reinforce activity limits and safety measures with patient and family/Bed in lowest position, wheels locked, appropriate side rails in place/Call bell, personal items and telephone in reach/Instruct patient to call for assistance before getting out of bed/chair/stretcher/Non-slip footwear applied when patient is off stretcher/[place] to call system/Physically safe environment - no spills, clutter or unnecessary equipment/Purposeful Proactive Rounding/Room/bathroom lighting operational, light cord in reach Assistance OOB with selected safe patient handling equipment if applicable/Assistance with ambulation/Communicate risk of Fall with Harm to all staff, patient, and family/Provide visual cue: red socks, yellow wristband, yellow gown, etc/Reinforce activity limits and safety measures with patient and family/Bed in lowest position, wheels locked, appropriate side rails in place/Call bell, personal items and telephone in reach/Instruct patient to call for assistance before getting out of bed/chair/stretcher/Non-slip footwear applied when patient is off stretcher/[place] to call system/Physically safe environment - no spills, clutter or unnecessary equipment/Purposeful Proactive Rounding/Room/bathroom lighting operational, light cord in reach [No, patient denies ideation or behavior] : No, patient denies ideation or behavior

## 2024-05-16 ENCOUNTER — APPOINTMENT (OUTPATIENT)
Dept: UROLOGY | Facility: CLINIC | Age: 74
End: 2024-05-16

## 2024-05-31 NOTE — DISCHARGE NOTE PROVIDER - NSCORESITESY/N_GEN_A_CORE_RD
DIAGNOSIS: WC: RIGHT shoulder, dislocation, tear.   APPOINTMENT DATE: 06/03/2024   NOTES STATUS DETAILS   OFFICE NOTE from referring provider Internal 05/29/2024 - Thiago Green MD - [facility] ORTHO   OFFICE NOTE from other specialist Internal 05/20/2024 - Robert Suarez DO - [facility]   DISCHARGE REPORT from the ER Internal 04/22/2024 - [facility] Emergency Department     (IMAGES & REPORTS) Internal       Yes

## 2024-06-06 ENCOUNTER — APPOINTMENT (OUTPATIENT)
Dept: UROLOGY | Facility: CLINIC | Age: 74
End: 2024-06-06

## 2024-06-07 RX ORDER — CIPROFLOXACIN HYDROCHLORIDE 500 MG/1
500 TABLET, FILM COATED ORAL
Qty: 28 | Refills: 0 | Status: ACTIVE | COMMUNITY
Start: 2023-02-16 | End: 1900-01-01

## 2024-06-10 LAB
APPEARANCE: ABNORMAL
BACTERIA UR CULT: NORMAL
BACTERIA: NEGATIVE /HPF
BILIRUBIN URINE: NEGATIVE
BLOOD URINE: ABNORMAL
CAST: 0 /LPF
COLOR: NORMAL
EPITHELIAL CELLS: 2 /HPF
GLUCOSE QUALITATIVE U: NEGATIVE MG/DL
KETONES URINE: NEGATIVE MG/DL
LEUKOCYTE ESTERASE URINE: ABNORMAL
MICROSCOPIC-UA: NORMAL
NITRITE URINE: NEGATIVE
PH URINE: 6
PROTEIN URINE: 30 MG/DL
RED BLOOD CELLS URINE: 2 /HPF
SPECIFIC GRAVITY URINE: 1.01
UROBILINOGEN URINE: 0.2 MG/DL
WHITE BLOOD CELLS URINE: 649 /HPF

## 2024-06-20 ENCOUNTER — APPOINTMENT (OUTPATIENT)
Dept: INTERNAL MEDICINE | Facility: CLINIC | Age: 74
End: 2024-06-20

## 2024-06-27 ENCOUNTER — APPOINTMENT (OUTPATIENT)
Dept: UROLOGY | Facility: CLINIC | Age: 74
End: 2024-06-27

## 2024-07-11 ENCOUNTER — APPOINTMENT (OUTPATIENT)
Dept: UROLOGY | Facility: CLINIC | Age: 74
End: 2024-07-11

## 2024-08-01 ENCOUNTER — APPOINTMENT (OUTPATIENT)
Dept: UROLOGY | Facility: CLINIC | Age: 74
End: 2024-08-01

## 2024-08-14 ENCOUNTER — NON-APPOINTMENT (OUTPATIENT)
Age: 74
End: 2024-08-14

## 2024-08-15 ENCOUNTER — APPOINTMENT (OUTPATIENT)
Dept: CARDIOLOGY | Facility: CLINIC | Age: 74
End: 2024-08-15

## 2024-08-15 ENCOUNTER — NON-APPOINTMENT (OUTPATIENT)
Age: 74
End: 2024-08-15

## 2024-08-15 VITALS
HEIGHT: 68 IN | SYSTOLIC BLOOD PRESSURE: 189 MMHG | BODY MASS INDEX: 40.62 KG/M2 | HEART RATE: 110 BPM | TEMPERATURE: 98.2 F | DIASTOLIC BLOOD PRESSURE: 82 MMHG | OXYGEN SATURATION: 96 % | WEIGHT: 268 LBS

## 2024-08-15 PROCEDURE — 99215 OFFICE O/P EST HI 40 MIN: CPT

## 2024-08-15 PROCEDURE — 93000 ELECTROCARDIOGRAM COMPLETE: CPT

## 2024-08-22 ENCOUNTER — APPOINTMENT (OUTPATIENT)
Dept: UROLOGY | Facility: CLINIC | Age: 74
End: 2024-08-22

## 2024-09-05 ENCOUNTER — APPOINTMENT (OUTPATIENT)
Dept: ULTRASOUND IMAGING | Facility: HOSPITAL | Age: 74
End: 2024-09-05

## 2024-09-05 ENCOUNTER — RESULT REVIEW (OUTPATIENT)
Age: 74
End: 2024-09-05

## 2024-09-05 ENCOUNTER — OUTPATIENT (OUTPATIENT)
Dept: OUTPATIENT SERVICES | Facility: HOSPITAL | Age: 74
LOS: 1 days | End: 2024-09-05
Payer: MEDICARE

## 2024-09-05 DIAGNOSIS — I25.10 ATHEROSCLEROTIC HEART DISEASE OF NATIVE CORONARY ARTERY WITHOUT ANGINA PECTORIS: ICD-10-CM

## 2024-09-05 DIAGNOSIS — E78.5 HYPERLIPIDEMIA, UNSPECIFIED: ICD-10-CM

## 2024-09-05 DIAGNOSIS — Z86.018 PERSONAL HISTORY OF OTHER BENIGN NEOPLASM: Chronic | ICD-10-CM

## 2024-09-05 DIAGNOSIS — Z98.62 PERIPHERAL VASCULAR ANGIOPLASTY STATUS: Chronic | ICD-10-CM

## 2024-09-05 PROCEDURE — 93925 LOWER EXTREMITY STUDY: CPT

## 2024-09-05 PROCEDURE — 93925 LOWER EXTREMITY STUDY: CPT | Mod: 26

## 2024-09-05 PROCEDURE — 93923 UPR/LXTR ART STDY 3+ LVLS: CPT

## 2024-09-05 PROCEDURE — 93923 UPR/LXTR ART STDY 3+ LVLS: CPT | Mod: 26

## 2024-09-12 ENCOUNTER — NON-APPOINTMENT (OUTPATIENT)
Age: 74
End: 2024-09-12

## 2024-09-12 ENCOUNTER — APPOINTMENT (OUTPATIENT)
Dept: INTERNAL MEDICINE | Facility: CLINIC | Age: 74
End: 2024-09-12
Payer: MEDICARE

## 2024-09-12 ENCOUNTER — APPOINTMENT (OUTPATIENT)
Dept: UROLOGY | Facility: CLINIC | Age: 74
End: 2024-09-12

## 2024-09-12 PROCEDURE — 99442: CPT | Mod: 93

## 2024-09-19 ENCOUNTER — APPOINTMENT (OUTPATIENT)
Dept: CARDIOLOGY | Facility: CLINIC | Age: 74
End: 2024-09-19
Payer: MEDICARE

## 2024-09-19 ENCOUNTER — NON-APPOINTMENT (OUTPATIENT)
Age: 74
End: 2024-09-19

## 2024-09-19 VITALS
TEMPERATURE: 97.1 F | HEART RATE: 80 BPM | WEIGHT: 240 LBS | BODY MASS INDEX: 36.37 KG/M2 | HEIGHT: 68 IN | OXYGEN SATURATION: 97 %

## 2024-09-19 VITALS — DIASTOLIC BLOOD PRESSURE: 84 MMHG | SYSTOLIC BLOOD PRESSURE: 172 MMHG

## 2024-09-19 DIAGNOSIS — E78.5 HYPERLIPIDEMIA, UNSPECIFIED: ICD-10-CM

## 2024-09-19 DIAGNOSIS — I10 ESSENTIAL (PRIMARY) HYPERTENSION: ICD-10-CM

## 2024-09-19 DIAGNOSIS — I25.10 ATHEROSCLEROTIC HEART DISEASE OF NATIVE CORONARY ARTERY W/OUT ANGINA PECTORIS: ICD-10-CM

## 2024-09-19 PROCEDURE — 99214 OFFICE O/P EST MOD 30 MIN: CPT

## 2024-09-19 PROCEDURE — 93000 ELECTROCARDIOGRAM COMPLETE: CPT

## 2024-09-22 NOTE — PHYSICAL EXAM
[Well Developed] : well developed [Well Nourished] : well nourished [No Acute Distress] : no acute distress [Ill-Appearing] : ill-appearing [Normal Conjunctiva] : normal conjunctiva [Normal Venous Pressure] : normal venous pressure [No Carotid Bruit] : no carotid bruit [Normal S1, S2] : normal S1, S2 [No Rub] : no rub [No Murmur] : no murmur [No Gallop] : no gallop [Clear Lung Fields] : clear lung fields [Good Air Entry] : good air entry [No Respiratory Distress] : no respiratory distress  [Soft] : abdomen soft [Non Tender] : non-tender [No Masses/organomegaly] : no masses/organomegaly [Normal Bowel Sounds] : normal bowel sounds [Normal Gait] : normal gait [No Edema] : no edema [No Cyanosis] : no cyanosis [No Varicosities] : no varicosities [No Clubbing] : no clubbing [No Rash] : no rash [No Skin Lesions] : no skin lesions [Moves all extremities] : moves all extremities [No Focal Deficits] : no focal deficits [Normal Speech] : normal speech [Alert and Oriented] : alert and oriented [Normal memory] : normal memory [de-identified] : Poor distal leg pulses  (L>R)

## 2024-09-22 NOTE — HISTORY OF PRESENT ILLNESS
[FreeTextEntry1] : Trudy is seeing me for a f/u.    Still noncompliant with meds Wants to review recent LE studies

## 2024-09-22 NOTE — PHYSICAL EXAM
[Well Developed] : well developed [Well Nourished] : well nourished [No Acute Distress] : no acute distress [Ill-Appearing] : ill-appearing [Normal Conjunctiva] : normal conjunctiva [Normal Venous Pressure] : normal venous pressure [No Carotid Bruit] : no carotid bruit [Normal S1, S2] : normal S1, S2 [No Murmur] : no murmur [No Rub] : no rub [No Gallop] : no gallop [Clear Lung Fields] : clear lung fields [Good Air Entry] : good air entry [No Respiratory Distress] : no respiratory distress  [Soft] : abdomen soft [Non Tender] : non-tender [No Masses/organomegaly] : no masses/organomegaly [Normal Bowel Sounds] : normal bowel sounds [Normal Gait] : normal gait [No Edema] : no edema [No Cyanosis] : no cyanosis [No Clubbing] : no clubbing [No Varicosities] : no varicosities [No Rash] : no rash [No Skin Lesions] : no skin lesions [Moves all extremities] : moves all extremities [No Focal Deficits] : no focal deficits [Normal Speech] : normal speech [Alert and Oriented] : alert and oriented [Normal memory] : normal memory [de-identified] : Poor distal leg pulses  (L>R)

## 2024-09-22 NOTE — DISCUSSION/SUMMARY
[FreeTextEntry1] : I still remain concerned about her med noncompliance and  future risk for MI and CVA I spent time and discussed the need for medical therap with her. I referred her to vascular medicine to discuss her PAD findings Will be seeing PCP for labs shortly  [EKG obtained to assist in diagnosis and management of assessed problem(s)] : EKG obtained to assist in diagnosis and management of assessed problem(s)

## 2024-09-23 ENCOUNTER — NON-APPOINTMENT (OUTPATIENT)
Age: 74
End: 2024-09-23

## 2024-09-23 DIAGNOSIS — J45.909 UNSPECIFIED ASTHMA, UNCOMPLICATED: ICD-10-CM

## 2024-10-10 ENCOUNTER — APPOINTMENT (OUTPATIENT)
Dept: UROLOGY | Facility: CLINIC | Age: 74
End: 2024-10-10

## 2024-10-22 ENCOUNTER — APPOINTMENT (OUTPATIENT)
Dept: INTERNAL MEDICINE | Facility: CLINIC | Age: 74
End: 2024-10-22
Payer: MEDICARE

## 2024-10-22 VITALS
WEIGHT: 269 LBS | BODY MASS INDEX: 40.77 KG/M2 | HEART RATE: 82 BPM | OXYGEN SATURATION: 95 % | RESPIRATION RATE: 14 BRPM | DIASTOLIC BLOOD PRESSURE: 82 MMHG | SYSTOLIC BLOOD PRESSURE: 150 MMHG | HEIGHT: 68 IN

## 2024-10-22 DIAGNOSIS — M19.90 UNSPECIFIED OSTEOARTHRITIS, UNSPECIFIED SITE: ICD-10-CM

## 2024-10-22 PROCEDURE — 99214 OFFICE O/P EST MOD 30 MIN: CPT

## 2024-10-22 PROCEDURE — G2211 COMPLEX E/M VISIT ADD ON: CPT

## 2024-10-31 ENCOUNTER — APPOINTMENT (OUTPATIENT)
Dept: UROLOGY | Facility: CLINIC | Age: 74
End: 2024-10-31

## 2024-11-11 ENCOUNTER — RX CHANGE (OUTPATIENT)
Age: 74
End: 2024-11-11

## 2024-11-14 ENCOUNTER — APPOINTMENT (OUTPATIENT)
Dept: UROLOGY | Facility: CLINIC | Age: 74
End: 2024-11-14

## 2024-11-21 ENCOUNTER — APPOINTMENT (OUTPATIENT)
Dept: UROLOGY | Facility: CLINIC | Age: 74
End: 2024-11-21

## 2024-12-12 ENCOUNTER — APPOINTMENT (OUTPATIENT)
Facility: CLINIC | Age: 74
End: 2024-12-12
Payer: MEDICARE

## 2024-12-12 ENCOUNTER — APPOINTMENT (OUTPATIENT)
Dept: UROLOGY | Facility: CLINIC | Age: 74
End: 2024-12-12

## 2024-12-12 VITALS
TEMPERATURE: 97.8 F | SYSTOLIC BLOOD PRESSURE: 185 MMHG | WEIGHT: 270 LBS | RESPIRATION RATE: 18 BRPM | DIASTOLIC BLOOD PRESSURE: 69 MMHG | HEIGHT: 68 IN | HEART RATE: 80 BPM | OXYGEN SATURATION: 96 % | BODY MASS INDEX: 40.92 KG/M2

## 2024-12-12 DIAGNOSIS — I10 ESSENTIAL (PRIMARY) HYPERTENSION: ICD-10-CM

## 2024-12-12 DIAGNOSIS — J20.9 ACUTE BRONCHITIS, UNSPECIFIED: ICD-10-CM

## 2024-12-12 DIAGNOSIS — G47.33 OBSTRUCTIVE SLEEP APNEA (ADULT) (PEDIATRIC): ICD-10-CM

## 2024-12-12 DIAGNOSIS — J45.909 UNSPECIFIED ASTHMA, UNCOMPLICATED: ICD-10-CM

## 2024-12-12 PROCEDURE — 99212 OFFICE O/P EST SF 10 MIN: CPT

## 2024-12-12 PROCEDURE — 99202 OFFICE O/P NEW SF 15 MIN: CPT

## 2024-12-12 RX ORDER — BUDESONIDE AND FORMOTEROL FUMARATE DIHYDRATE 160; 4.5 UG/1; UG/1
160-4.5 AEROSOL RESPIRATORY (INHALATION) TWICE DAILY
Qty: 3 | Refills: 5 | Status: ACTIVE | COMMUNITY
Start: 2024-12-12 | End: 1900-01-01

## 2024-12-12 RX ORDER — DOXYCYCLINE HYCLATE 100 MG/1
100 TABLET ORAL
Qty: 14 | Refills: 5 | Status: ACTIVE | COMMUNITY
Start: 2024-12-12 | End: 1900-01-01

## 2024-12-12 RX ORDER — BUDESONIDE AND FORMOTEROL FUMARATE DIHYDRATE 160; 4.5 UG/1; UG/1
160-4.5 AEROSOL RESPIRATORY (INHALATION)
Refills: 0 | Status: ACTIVE | COMMUNITY

## 2025-01-03 ENCOUNTER — APPOINTMENT (OUTPATIENT)
Dept: UROLOGY | Facility: CLINIC | Age: 75
End: 2025-01-03

## 2025-01-23 ENCOUNTER — APPOINTMENT (OUTPATIENT)
Dept: UROLOGY | Facility: CLINIC | Age: 75
End: 2025-01-23

## 2025-01-30 ENCOUNTER — APPOINTMENT (OUTPATIENT)
Dept: UROLOGY | Facility: CLINIC | Age: 75
End: 2025-01-30
Payer: MEDICARE

## 2025-01-30 VITALS
HEART RATE: 70 BPM | BODY MASS INDEX: 40.92 KG/M2 | HEIGHT: 68 IN | TEMPERATURE: 98 F | SYSTOLIC BLOOD PRESSURE: 177 MMHG | OXYGEN SATURATION: 95 % | WEIGHT: 270 LBS | RESPIRATION RATE: 16 BRPM | DIASTOLIC BLOOD PRESSURE: 74 MMHG

## 2025-01-30 DIAGNOSIS — N39.41 URGE INCONTINENCE: ICD-10-CM

## 2025-01-30 DIAGNOSIS — N32.81 OVERACTIVE BLADDER: ICD-10-CM

## 2025-01-30 DIAGNOSIS — E10.9 TYPE 1 DIABETES MELLITUS W/OUT COMPLICATIONS: ICD-10-CM

## 2025-01-30 PROCEDURE — 99214 OFFICE O/P EST MOD 30 MIN: CPT

## 2025-01-31 LAB
APPEARANCE: CLEAR
BACTERIA: NEGATIVE /HPF
BILIRUBIN URINE: NEGATIVE
BLOOD URINE: NEGATIVE
CAST: 0 /LPF
COLOR: YELLOW
EPITHELIAL CELLS: 0 /HPF
GLUCOSE QUALITATIVE U: NEGATIVE MG/DL
KETONES URINE: NEGATIVE MG/DL
LEUKOCYTE ESTERASE URINE: ABNORMAL
MICROSCOPIC-UA: NORMAL
NITRITE URINE: NEGATIVE
PH URINE: 6
PROTEIN URINE: 100 MG/DL
RED BLOOD CELLS URINE: 1 /HPF
REVIEW: NORMAL
SPECIFIC GRAVITY URINE: 1.01
UROBILINOGEN URINE: 0.2 MG/DL
WHITE BLOOD CELLS URINE: 71 /HPF
YEAST-LIKE CELLS: PRESENT

## 2025-02-04 ENCOUNTER — DOCTOR'S OFFICE (OUTPATIENT)
Facility: LOCATION | Age: 75
Setting detail: OPHTHALMOLOGY
End: 2025-02-04
Payer: MEDICARE

## 2025-02-04 DIAGNOSIS — H35.052: ICD-10-CM

## 2025-02-04 DIAGNOSIS — H40.052: ICD-10-CM

## 2025-02-04 DIAGNOSIS — H35.82: ICD-10-CM

## 2025-02-04 DIAGNOSIS — E10.3513: ICD-10-CM

## 2025-02-04 DIAGNOSIS — H25.13: ICD-10-CM

## 2025-02-04 PROCEDURE — 67228 TREATMENT X10SV RETINOPATHY: CPT | Mod: LT | Performed by: OPHTHALMOLOGY

## 2025-02-04 PROCEDURE — 92235 FLUORESCEIN ANGRPH MLTIFRAME: CPT | Performed by: OPHTHALMOLOGY

## 2025-02-04 PROCEDURE — 99213 OFFICE O/P EST LOW 20 MIN: CPT | Mod: 25 | Performed by: OPHTHALMOLOGY

## 2025-02-04 PROCEDURE — 92134 CPTRZ OPH DX IMG PST SGM RTA: CPT | Performed by: OPHTHALMOLOGY

## 2025-02-04 ASSESSMENT — CONFRONTATIONAL VISUAL FIELD TEST (CVF)
OD_FINDINGS: FULL
OS_FINDINGS: FULL

## 2025-02-04 ASSESSMENT — VISUAL ACUITY
OD_BCVA: 20/50-
OS_BCVA: 20/25-1

## 2025-03-18 ENCOUNTER — DOCTOR'S OFFICE (OUTPATIENT)
Facility: LOCATION | Age: 75
Setting detail: OPHTHALMOLOGY
End: 2025-03-18
Payer: MEDICARE

## 2025-03-18 DIAGNOSIS — H35.82: ICD-10-CM

## 2025-03-18 DIAGNOSIS — H40.023: ICD-10-CM

## 2025-03-18 DIAGNOSIS — H40.052: ICD-10-CM

## 2025-03-18 DIAGNOSIS — H25.13: ICD-10-CM

## 2025-03-18 DIAGNOSIS — E10.3513: ICD-10-CM

## 2025-03-18 DIAGNOSIS — H35.052: ICD-10-CM

## 2025-03-18 PROCEDURE — 92012 INTRM OPH EXAM EST PATIENT: CPT | Performed by: OPHTHALMOLOGY

## 2025-03-18 PROCEDURE — 92020 GONIOSCOPY: CPT | Performed by: OPHTHALMOLOGY

## 2025-03-18 ASSESSMENT — CONFRONTATIONAL VISUAL FIELD TEST (CVF)
OS_FINDINGS: FULL
OD_FINDINGS: FULL

## 2025-03-18 ASSESSMENT — TONOMETRY: OS_IOP_MMHG: 21

## 2025-03-18 ASSESSMENT — VISUAL ACUITY
OD_BCVA: 20/70
OS_BCVA: 20/30-1

## 2025-04-10 ENCOUNTER — APPOINTMENT (OUTPATIENT)
Facility: CLINIC | Age: 75
End: 2025-04-10

## 2025-04-15 ENCOUNTER — DOCTOR'S OFFICE (OUTPATIENT)
Facility: LOCATION | Age: 75
Setting detail: OPHTHALMOLOGY
End: 2025-04-15
Payer: MEDICARE

## 2025-04-15 DIAGNOSIS — H40.052: ICD-10-CM

## 2025-04-15 DIAGNOSIS — H25.13: ICD-10-CM

## 2025-04-15 DIAGNOSIS — H35.82: ICD-10-CM

## 2025-04-15 DIAGNOSIS — E10.3512: ICD-10-CM

## 2025-04-15 DIAGNOSIS — H35.052: ICD-10-CM

## 2025-04-15 DIAGNOSIS — H40.023: ICD-10-CM

## 2025-04-15 PROCEDURE — 67228 TREATMENT X10SV RETINOPATHY: CPT | Mod: LT | Performed by: OPHTHALMOLOGY

## 2025-04-15 PROCEDURE — 92012 INTRM OPH EXAM EST PATIENT: CPT | Mod: 25 | Performed by: OPHTHALMOLOGY

## 2025-04-15 PROCEDURE — 92134 CPTRZ OPH DX IMG PST SGM RTA: CPT | Performed by: OPHTHALMOLOGY

## 2025-04-15 PROCEDURE — 92235 FLUORESCEIN ANGRPH MLTIFRAME: CPT | Performed by: OPHTHALMOLOGY

## 2025-04-15 ASSESSMENT — TONOMETRY
OD_IOP_MMHG: 19
OS_IOP_MMHG: 20

## 2025-04-15 ASSESSMENT — CONFRONTATIONAL VISUAL FIELD TEST (CVF)
OS_FINDINGS: FULL
OD_FINDINGS: FULL

## 2025-04-15 ASSESSMENT — VISUAL ACUITY
OD_BCVA: 20/70
OS_BCVA: 20/30-1

## 2025-05-09 ENCOUNTER — APPOINTMENT (OUTPATIENT)
Dept: CARDIOLOGY | Facility: CLINIC | Age: 75
End: 2025-05-09
Payer: MEDICARE

## 2025-05-09 VITALS
HEART RATE: 73 BPM | DIASTOLIC BLOOD PRESSURE: 72 MMHG | OXYGEN SATURATION: 97 % | BODY MASS INDEX: 39.53 KG/M2 | SYSTOLIC BLOOD PRESSURE: 176 MMHG | WEIGHT: 260 LBS

## 2025-05-09 DIAGNOSIS — R60.0 LOCALIZED EDEMA: ICD-10-CM

## 2025-05-09 DIAGNOSIS — I10 ESSENTIAL (PRIMARY) HYPERTENSION: ICD-10-CM

## 2025-05-09 DIAGNOSIS — Z00.00 ENCOUNTER FOR GENERAL ADULT MEDICAL EXAMINATION W/OUT ABNORMAL FINDINGS: ICD-10-CM

## 2025-05-09 PROCEDURE — 99204 OFFICE O/P NEW MOD 45 MIN: CPT

## 2025-05-12 ENCOUNTER — APPOINTMENT (OUTPATIENT)
Dept: CARDIOLOGY | Facility: CLINIC | Age: 75
End: 2025-05-12

## 2025-05-20 ENCOUNTER — DOCTOR'S OFFICE (OUTPATIENT)
Facility: LOCATION | Age: 75
Setting detail: OPHTHALMOLOGY
End: 2025-05-20
Payer: MEDICARE

## 2025-05-20 DIAGNOSIS — H40.052: ICD-10-CM

## 2025-05-20 DIAGNOSIS — H35.052: ICD-10-CM

## 2025-05-20 DIAGNOSIS — H35.82: ICD-10-CM

## 2025-05-20 DIAGNOSIS — E10.3513: ICD-10-CM

## 2025-05-20 DIAGNOSIS — H25.13: ICD-10-CM

## 2025-05-20 PROCEDURE — 67210 TREATMENT OF RETINAL LESION: CPT | Mod: LT | Performed by: OPHTHALMOLOGY

## 2025-05-20 PROCEDURE — 99214 OFFICE O/P EST MOD 30 MIN: CPT | Mod: 57 | Performed by: OPHTHALMOLOGY

## 2025-05-20 PROCEDURE — 92134 CPTRZ OPH DX IMG PST SGM RTA: CPT | Performed by: OPHTHALMOLOGY

## 2025-05-20 PROCEDURE — 92201 OPSCPY EXTND RTA DRAW UNI/BI: CPT | Performed by: OPHTHALMOLOGY

## 2025-05-20 PROCEDURE — 92235 FLUORESCEIN ANGRPH MLTIFRAME: CPT | Performed by: OPHTHALMOLOGY

## 2025-05-20 ASSESSMENT — CONFRONTATIONAL VISUAL FIELD TEST (CVF)
OD_FINDINGS: FULL
OS_FINDINGS: FULL

## 2025-05-22 ASSESSMENT — VISUAL ACUITY
OD_BCVA: 20/60+1
OS_BCVA: 20/25

## 2025-06-06 ENCOUNTER — APPOINTMENT (OUTPATIENT)
Dept: CARDIOLOGY | Facility: CLINIC | Age: 75
End: 2025-06-06
Payer: MEDICARE

## 2025-06-06 PROCEDURE — 93306 TTE W/DOPPLER COMPLETE: CPT

## 2025-06-09 ENCOUNTER — NON-APPOINTMENT (OUTPATIENT)
Age: 75
End: 2025-06-09

## 2025-06-12 ENCOUNTER — APPOINTMENT (OUTPATIENT)
Facility: CLINIC | Age: 75
End: 2025-06-12
Payer: MEDICARE

## 2025-06-12 VITALS
RESPIRATION RATE: 16 BRPM | HEART RATE: 74 BPM | SYSTOLIC BLOOD PRESSURE: 188 MMHG | HEIGHT: 68 IN | WEIGHT: 270 LBS | DIASTOLIC BLOOD PRESSURE: 70 MMHG | BODY MASS INDEX: 40.92 KG/M2 | TEMPERATURE: 98 F | OXYGEN SATURATION: 96 %

## 2025-06-12 PROBLEM — J45.41 MODERATE PERSISTENT ASTHMA WITH EXACERBATION: Status: ACTIVE | Noted: 2025-06-12

## 2025-06-12 PROBLEM — Z86.39 HISTORY OF DIABETES MELLITUS: Status: RESOLVED | Noted: 2017-07-14 | Resolved: 2025-06-12

## 2025-06-12 PROBLEM — I27.20 PULMONARY HYPERTENSION: Status: ACTIVE | Noted: 2025-06-12

## 2025-06-12 PROBLEM — I07.1 TRICUSPID INSUFFICIENCY: Status: ACTIVE | Noted: 2025-06-12

## 2025-06-12 PROCEDURE — 94729 DIFFUSING CAPACITY: CPT

## 2025-06-12 PROCEDURE — 94727 GAS DIL/WSHOT DETER LNG VOL: CPT

## 2025-06-12 PROCEDURE — ZZZZZ: CPT

## 2025-06-12 PROCEDURE — 94060 EVALUATION OF WHEEZING: CPT

## 2025-06-12 PROCEDURE — 99215 OFFICE O/P EST HI 40 MIN: CPT | Mod: 25

## 2025-06-30 ENCOUNTER — TRANSCRIPTION ENCOUNTER (OUTPATIENT)
Age: 75
End: 2025-06-30

## 2025-07-02 PROBLEM — Z79.899: Status: ACTIVE | Noted: 2025-07-02

## 2025-07-10 ENCOUNTER — NON-APPOINTMENT (OUTPATIENT)
Age: 75
End: 2025-07-10

## 2025-07-10 ENCOUNTER — APPOINTMENT (OUTPATIENT)
Dept: CARDIOLOGY | Facility: CLINIC | Age: 75
End: 2025-07-10
Payer: MEDICARE

## 2025-07-10 VITALS
OXYGEN SATURATION: 97 % | BODY MASS INDEX: 40.92 KG/M2 | HEIGHT: 68 IN | SYSTOLIC BLOOD PRESSURE: 186 MMHG | DIASTOLIC BLOOD PRESSURE: 78 MMHG | TEMPERATURE: 98.7 F | HEART RATE: 77 BPM | WEIGHT: 270 LBS

## 2025-07-10 PROCEDURE — 99214 OFFICE O/P EST MOD 30 MIN: CPT

## 2025-07-10 PROCEDURE — 93000 ELECTROCARDIOGRAM COMPLETE: CPT

## 2025-07-15 ENCOUNTER — DOCTOR'S OFFICE (OUTPATIENT)
Facility: LOCATION | Age: 75
Setting detail: OPHTHALMOLOGY
End: 2025-07-15
Payer: MEDICARE

## 2025-07-15 DIAGNOSIS — E10.3512: ICD-10-CM

## 2025-07-15 DIAGNOSIS — E10.3513: ICD-10-CM

## 2025-07-15 DIAGNOSIS — H35.052: ICD-10-CM

## 2025-07-15 PROBLEM — E10.3511: Status: ACTIVE | Noted: 2025-07-15

## 2025-07-15 PROCEDURE — 67228 TREATMENT X10SV RETINOPATHY: CPT | Mod: 79,LT | Performed by: OPHTHALMOLOGY

## 2025-07-15 PROCEDURE — 92134 CPTRZ OPH DX IMG PST SGM RTA: CPT | Performed by: OPHTHALMOLOGY

## 2025-07-15 PROCEDURE — 92235 FLUORESCEIN ANGRPH MLTIFRAME: CPT | Performed by: OPHTHALMOLOGY

## 2025-07-15 ASSESSMENT — CONFRONTATIONAL VISUAL FIELD TEST (CVF)
OD_FINDINGS: FULL
OS_FINDINGS: FULL

## 2025-07-15 ASSESSMENT — VISUAL ACUITY
OS_BCVA: 20/30
OD_BCVA: 20/80

## 2025-07-17 ENCOUNTER — NON-APPOINTMENT (OUTPATIENT)
Age: 75
End: 2025-07-17

## 2025-08-13 ENCOUNTER — APPOINTMENT (OUTPATIENT)
Dept: INTERNAL MEDICINE | Facility: CLINIC | Age: 75
End: 2025-08-13
Payer: MEDICARE

## 2025-08-13 VITALS
HEART RATE: 69 BPM | TEMPERATURE: 97.8 F | HEIGHT: 68 IN | RESPIRATION RATE: 16 BRPM | BODY MASS INDEX: 40.62 KG/M2 | OXYGEN SATURATION: 95 % | SYSTOLIC BLOOD PRESSURE: 134 MMHG | WEIGHT: 268 LBS | DIASTOLIC BLOOD PRESSURE: 72 MMHG

## 2025-08-13 DIAGNOSIS — E66.01 MORBID (SEVERE) OBESITY DUE TO EXCESS CALORIES: ICD-10-CM

## 2025-08-13 DIAGNOSIS — I25.10 ATHEROSCLEROTIC HEART DISEASE OF NATIVE CORONARY ARTERY W/OUT ANGINA PECTORIS: ICD-10-CM

## 2025-08-13 DIAGNOSIS — I10 ESSENTIAL (PRIMARY) HYPERTENSION: ICD-10-CM

## 2025-08-13 DIAGNOSIS — N30.00 ACUTE CYSTITIS W/OUT HEMATURIA: ICD-10-CM

## 2025-08-13 PROCEDURE — 99214 OFFICE O/P EST MOD 30 MIN: CPT

## 2025-08-13 PROCEDURE — G2211 COMPLEX E/M VISIT ADD ON: CPT

## (undated) DEVICE — SOL IRR POUR NS 0.9% 500ML

## (undated) DEVICE — SOL IRR POUR H2O 250ML

## (undated) DEVICE — DRSG WEBRIL 6"

## (undated) DEVICE — DRSG STOCKINETTE TUBULAR COTTON 1PLY 6X72"

## (undated) DEVICE — VENODYNE/SCD SLEEVE CALF LARGE

## (undated) DEVICE — SUT POLYSORB 2-0 30" GS-21 UNDYED

## (undated) DEVICE — DRILL BIT STRYKER ORTHO 2.7MM

## (undated) DEVICE — POSITIONER FOAM HEADREST (PINK)

## (undated) DEVICE — GLV 8.5 PROTEXIS (WHITE)

## (undated) DEVICE — WARMING BLANKET UPPER ADULT

## (undated) DEVICE — SPECIMEN CONTAINER 100ML

## (undated) DEVICE — DRAPE MAYO STAND 30"

## (undated) DEVICE — DRAPE C ARM UNIVERSAL

## (undated) DEVICE — SUT POLYSORB 1 36" GS-21 UNDYED

## (undated) DEVICE — DRSG KLING 6"

## (undated) DEVICE — POSITIONER FOAM EGG CRATE ULNAR 2PCS (PINK)

## (undated) DEVICE — SUT POLYSORB 0 36" GS-24 UNDYED

## (undated) DEVICE — POSITIONER CARDIAC BUMP

## (undated) DEVICE — SUT POLYSORB 0 30" GS-21 UNDYED

## (undated) DEVICE — GLV 8 PROTEXIS (WHITE)

## (undated) DEVICE — PACK EXTREMITY

## (undated) DEVICE — DRILL BIT SYNTHES ORTHO QC 2.5X110MM

## (undated) DEVICE — GLV 7.5 PROTEXIS (WHITE)

## (undated) DEVICE — BLADE SCALPEL SAFETYLOCK #15